# Patient Record
Sex: FEMALE | Race: WHITE | Employment: OTHER | URBAN - METROPOLITAN AREA
[De-identification: names, ages, dates, MRNs, and addresses within clinical notes are randomized per-mention and may not be internally consistent; named-entity substitution may affect disease eponyms.]

---

## 2021-01-26 ENCOUNTER — APPOINTMENT (OUTPATIENT)
Dept: GENERAL RADIOLOGY | Age: 81
DRG: 190 | End: 2021-01-26
Payer: MEDICARE

## 2021-01-26 ENCOUNTER — HOSPITAL ENCOUNTER (INPATIENT)
Age: 81
LOS: 6 days | Discharge: HOME | DRG: 190 | End: 2021-02-01
Attending: EMERGENCY MEDICINE | Admitting: INTERNAL MEDICINE
Payer: MEDICARE

## 2021-01-26 DIAGNOSIS — J18.9 PNEUMONIA DUE TO ORGANISM: Primary | ICD-10-CM

## 2021-01-26 DIAGNOSIS — E87.6 HYPOKALEMIA: ICD-10-CM

## 2021-01-26 DIAGNOSIS — J96.11 CHRONIC RESPIRATORY FAILURE WITH HYPOXIA (HCC): ICD-10-CM

## 2021-01-26 DIAGNOSIS — E83.42 HYPOMAGNESEMIA: ICD-10-CM

## 2021-01-26 DIAGNOSIS — R79.1 SUPRATHERAPEUTIC INR: ICD-10-CM

## 2021-01-26 DIAGNOSIS — J44.1 COPD EXACERBATION (HCC): ICD-10-CM

## 2021-01-26 DIAGNOSIS — D64.9 ANEMIA, UNSPECIFIED TYPE: ICD-10-CM

## 2021-01-26 LAB
ALBUMIN SERPL-MCNC: 3.4 G/DL (ref 3.5–5.2)
ALP BLD-CCNC: 97 U/L (ref 35–104)
ALT SERPL-CCNC: 7 U/L (ref 0–32)
ANION GAP SERPL CALCULATED.3IONS-SCNC: 9 MMOL/L (ref 7–16)
AST SERPL-CCNC: 11 U/L (ref 0–31)
BASOPHILS ABSOLUTE: 0.05 E9/L (ref 0–0.2)
BASOPHILS RELATIVE PERCENT: 0.3 % (ref 0–2)
BILIRUB SERPL-MCNC: 0.3 MG/DL (ref 0–1.2)
BUN BLDV-MCNC: 14 MG/DL (ref 8–23)
CALCIUM SERPL-MCNC: 8.8 MG/DL (ref 8.6–10.2)
CHLORIDE BLD-SCNC: 103 MMOL/L (ref 98–107)
CO2: 31 MMOL/L (ref 22–29)
CREAT SERPL-MCNC: 0.8 MG/DL (ref 0.5–1)
EOSINOPHILS ABSOLUTE: 0.05 E9/L (ref 0.05–0.5)
EOSINOPHILS RELATIVE PERCENT: 0.3 % (ref 0–6)
GFR AFRICAN AMERICAN: >60
GFR NON-AFRICAN AMERICAN: >60 ML/MIN/1.73
GLUCOSE BLD-MCNC: 138 MG/DL (ref 74–99)
HCT VFR BLD CALC: 31.8 % (ref 34–48)
HEMOGLOBIN: 9.5 G/DL (ref 11.5–15.5)
IMMATURE GRANULOCYTES #: 0.09 E9/L
IMMATURE GRANULOCYTES %: 0.5 % (ref 0–5)
INR BLD: 4
LACTIC ACID, SEPSIS: 0.9 MMOL/L (ref 0.5–1.9)
LYMPHOCYTES ABSOLUTE: 1.76 E9/L (ref 1.5–4)
LYMPHOCYTES RELATIVE PERCENT: 10 % (ref 20–42)
MAGNESIUM: 1.5 MG/DL (ref 1.6–2.6)
MCH RBC QN AUTO: 24.4 PG (ref 26–35)
MCHC RBC AUTO-ENTMCNC: 29.9 % (ref 32–34.5)
MCV RBC AUTO: 81.7 FL (ref 80–99.9)
MONOCYTES ABSOLUTE: 0.82 E9/L (ref 0.1–0.95)
MONOCYTES RELATIVE PERCENT: 4.7 % (ref 2–12)
NEUTROPHILS ABSOLUTE: 14.83 E9/L (ref 1.8–7.3)
NEUTROPHILS RELATIVE PERCENT: 84.2 % (ref 43–80)
PDW BLD-RTO: 18.7 FL (ref 11.5–15)
PLATELET # BLD: 734 E9/L (ref 130–450)
PMV BLD AUTO: 9.8 FL (ref 7–12)
POTASSIUM REFLEX MAGNESIUM: 3.4 MMOL/L (ref 3.5–5)
PRO-BNP: 3969 PG/ML (ref 0–450)
PROTHROMBIN TIME: 47 SEC (ref 9.3–12.4)
RBC # BLD: 3.89 E12/L (ref 3.5–5.5)
SARS-COV-2, NAAT: NOT DETECTED
SODIUM BLD-SCNC: 143 MMOL/L (ref 132–146)
TOTAL PROTEIN: 7 G/DL (ref 6.4–8.3)
TROPONIN: <0.01 NG/ML (ref 0–0.03)
WBC # BLD: 17.6 E9/L (ref 4.5–11.5)

## 2021-01-26 PROCEDURE — 2060000000 HC ICU INTERMEDIATE R&B

## 2021-01-26 PROCEDURE — 80053 COMPREHEN METABOLIC PANEL: CPT

## 2021-01-26 PROCEDURE — 6360000002 HC RX W HCPCS: Performed by: STUDENT IN AN ORGANIZED HEALTH CARE EDUCATION/TRAINING PROGRAM

## 2021-01-26 PROCEDURE — 83735 ASSAY OF MAGNESIUM: CPT

## 2021-01-26 PROCEDURE — 6360000002 HC RX W HCPCS: Performed by: EMERGENCY MEDICINE

## 2021-01-26 PROCEDURE — 84484 ASSAY OF TROPONIN QUANT: CPT

## 2021-01-26 PROCEDURE — 96374 THER/PROPH/DIAG INJ IV PUSH: CPT

## 2021-01-26 PROCEDURE — 2580000003 HC RX 258: Performed by: STUDENT IN AN ORGANIZED HEALTH CARE EDUCATION/TRAINING PROGRAM

## 2021-01-26 PROCEDURE — 87040 BLOOD CULTURE FOR BACTERIA: CPT

## 2021-01-26 PROCEDURE — 93005 ELECTROCARDIOGRAM TRACING: CPT | Performed by: STUDENT IN AN ORGANIZED HEALTH CARE EDUCATION/TRAINING PROGRAM

## 2021-01-26 PROCEDURE — 83880 ASSAY OF NATRIURETIC PEPTIDE: CPT

## 2021-01-26 PROCEDURE — 94664 DEMO&/EVAL PT USE INHALER: CPT

## 2021-01-26 PROCEDURE — 6370000000 HC RX 637 (ALT 250 FOR IP): Performed by: STUDENT IN AN ORGANIZED HEALTH CARE EDUCATION/TRAINING PROGRAM

## 2021-01-26 PROCEDURE — U0002 COVID-19 LAB TEST NON-CDC: HCPCS

## 2021-01-26 PROCEDURE — 83605 ASSAY OF LACTIC ACID: CPT

## 2021-01-26 PROCEDURE — 85025 COMPLETE CBC W/AUTO DIFF WBC: CPT

## 2021-01-26 PROCEDURE — 85610 PROTHROMBIN TIME: CPT

## 2021-01-26 PROCEDURE — 99285 EMERGENCY DEPT VISIT HI MDM: CPT

## 2021-01-26 PROCEDURE — 71045 X-RAY EXAM CHEST 1 VIEW: CPT

## 2021-01-26 RX ORDER — MAGNESIUM SULFATE IN WATER 40 MG/ML
2000 INJECTION, SOLUTION INTRAVENOUS ONCE
Status: COMPLETED | OUTPATIENT
Start: 2021-01-26 | End: 2021-01-27

## 2021-01-26 RX ORDER — METHYLPREDNISOLONE SODIUM SUCCINATE 125 MG/2ML
125 INJECTION, POWDER, LYOPHILIZED, FOR SOLUTION INTRAMUSCULAR; INTRAVENOUS ONCE
Status: COMPLETED | OUTPATIENT
Start: 2021-01-26 | End: 2021-01-26

## 2021-01-26 RX ORDER — IPRATROPIUM BROMIDE AND ALBUTEROL SULFATE 2.5; .5 MG/3ML; MG/3ML
1 SOLUTION RESPIRATORY (INHALATION) ONCE
Status: COMPLETED | OUTPATIENT
Start: 2021-01-26 | End: 2021-01-26

## 2021-01-26 RX ORDER — DOXYCYCLINE HYCLATE 100 MG/1
100 CAPSULE ORAL ONCE
Status: COMPLETED | OUTPATIENT
Start: 2021-01-26 | End: 2021-01-26

## 2021-01-26 RX ADMIN — CEFTRIAXONE 1000 MG: 1 INJECTION, POWDER, FOR SOLUTION INTRAMUSCULAR; INTRAVENOUS at 23:14

## 2021-01-26 RX ADMIN — METHYLPREDNISOLONE SODIUM SUCCINATE 125 MG: 125 INJECTION, POWDER, FOR SOLUTION INTRAMUSCULAR; INTRAVENOUS at 21:14

## 2021-01-26 RX ADMIN — IPRATROPIUM BROMIDE AND ALBUTEROL SULFATE 1 AMPULE: .5; 3 SOLUTION RESPIRATORY (INHALATION) at 20:47

## 2021-01-26 RX ADMIN — MAGNESIUM SULFATE HEPTAHYDRATE 2000 MG: 40 INJECTION, SOLUTION INTRAVENOUS at 23:16

## 2021-01-26 RX ADMIN — DOXYCYCLINE HYCLATE 100 MG: 100 CAPSULE ORAL at 23:12

## 2021-01-26 ASSESSMENT — ENCOUNTER SYMPTOMS
PHOTOPHOBIA: 0
SHORTNESS OF BREATH: 1
NAUSEA: 0
VOMITING: 0
DIARRHEA: 0
ABDOMINAL PAIN: 0
ABDOMINAL DISTENTION: 0
SORE THROAT: 0
SPUTUM PRODUCTION: 0
WHEEZING: 1
COUGH: 0

## 2021-01-27 LAB
ANION GAP SERPL CALCULATED.3IONS-SCNC: 10 MMOL/L (ref 7–16)
BUN BLDV-MCNC: 15 MG/DL (ref 8–23)
CALCIUM SERPL-MCNC: 8.4 MG/DL (ref 8.6–10.2)
CHLORIDE BLD-SCNC: 104 MMOL/L (ref 98–107)
CO2: 29 MMOL/L (ref 22–29)
CREAT SERPL-MCNC: 0.8 MG/DL (ref 0.5–1)
EKG ATRIAL RATE: 96 BPM
EKG P AXIS: 46 DEGREES
EKG P-R INTERVAL: 110 MS
EKG Q-T INTERVAL: 366 MS
EKG QRS DURATION: 82 MS
EKG QTC CALCULATION (BAZETT): 462 MS
EKG R AXIS: 54 DEGREES
EKG T AXIS: 68 DEGREES
EKG VENTRICULAR RATE: 96 BPM
GFR AFRICAN AMERICAN: >60
GFR NON-AFRICAN AMERICAN: >60 ML/MIN/1.73
GLUCOSE BLD-MCNC: 216 MG/DL (ref 74–99)
HCT VFR BLD CALC: 31.2 % (ref 34–48)
HEMOGLOBIN: 9.5 G/DL (ref 11.5–15.5)
INR BLD: 3.4
MCH RBC QN AUTO: 24.6 PG (ref 26–35)
MCHC RBC AUTO-ENTMCNC: 30.4 % (ref 32–34.5)
MCV RBC AUTO: 80.8 FL (ref 80–99.9)
PDW BLD-RTO: 18.7 FL (ref 11.5–15)
PLATELET # BLD: 675 E9/L (ref 130–450)
PMV BLD AUTO: 9.7 FL (ref 7–12)
POTASSIUM REFLEX MAGNESIUM: 4.2 MMOL/L (ref 3.5–5)
PROCALCITONIN: 0.06 NG/ML (ref 0–0.08)
PROTHROMBIN TIME: 40 SEC (ref 9.3–12.4)
RBC # BLD: 3.86 E12/L (ref 3.5–5.5)
SODIUM BLD-SCNC: 143 MMOL/L (ref 132–146)
WBC # BLD: 11.9 E9/L (ref 4.5–11.5)

## 2021-01-27 PROCEDURE — 99223 1ST HOSP IP/OBS HIGH 75: CPT | Performed by: INTERNAL MEDICINE

## 2021-01-27 PROCEDURE — 2580000003 HC RX 258: Performed by: INTERNAL MEDICINE

## 2021-01-27 PROCEDURE — 6370000000 HC RX 637 (ALT 250 FOR IP): Performed by: INTERNAL MEDICINE

## 2021-01-27 PROCEDURE — 85610 PROTHROMBIN TIME: CPT

## 2021-01-27 PROCEDURE — 94664 DEMO&/EVAL PT USE INHALER: CPT

## 2021-01-27 PROCEDURE — 36415 COLL VENOUS BLD VENIPUNCTURE: CPT

## 2021-01-27 PROCEDURE — 83036 HEMOGLOBIN GLYCOSYLATED A1C: CPT

## 2021-01-27 PROCEDURE — 6360000002 HC RX W HCPCS: Performed by: INTERNAL MEDICINE

## 2021-01-27 PROCEDURE — 80048 BASIC METABOLIC PNL TOTAL CA: CPT

## 2021-01-27 PROCEDURE — 2700000000 HC OXYGEN THERAPY PER DAY

## 2021-01-27 PROCEDURE — 93010 ELECTROCARDIOGRAM REPORT: CPT | Performed by: INTERNAL MEDICINE

## 2021-01-27 PROCEDURE — 2060000000 HC ICU INTERMEDIATE R&B

## 2021-01-27 PROCEDURE — 85027 COMPLETE CBC AUTOMATED: CPT

## 2021-01-27 PROCEDURE — 94640 AIRWAY INHALATION TREATMENT: CPT

## 2021-01-27 PROCEDURE — 84145 PROCALCITONIN (PCT): CPT

## 2021-01-27 RX ORDER — SODIUM CHLORIDE 0.9 % (FLUSH) 0.9 %
10 SYRINGE (ML) INJECTION PRN
Status: DISCONTINUED | OUTPATIENT
Start: 2021-01-27 | End: 2021-02-01 | Stop reason: HOSPADM

## 2021-01-27 RX ORDER — PANTOPRAZOLE SODIUM 40 MG/1
40 TABLET, DELAYED RELEASE ORAL 2 TIMES DAILY
COMMUNITY

## 2021-01-27 RX ORDER — WARFARIN SODIUM 5 MG/1
10 TABLET ORAL DAILY
Status: ON HOLD | COMMUNITY
End: 2021-02-01 | Stop reason: HOSPADM

## 2021-01-27 RX ORDER — METHYLPREDNISOLONE SODIUM SUCCINATE 40 MG/ML
40 INJECTION, POWDER, LYOPHILIZED, FOR SOLUTION INTRAMUSCULAR; INTRAVENOUS EVERY 12 HOURS
Status: DISCONTINUED | OUTPATIENT
Start: 2021-01-27 | End: 2021-01-31

## 2021-01-27 RX ORDER — PROMETHAZINE HYDROCHLORIDE 25 MG/1
12.5 TABLET ORAL EVERY 6 HOURS PRN
Status: DISCONTINUED | OUTPATIENT
Start: 2021-01-27 | End: 2021-02-01 | Stop reason: HOSPADM

## 2021-01-27 RX ORDER — ARFORMOTEROL TARTRATE 15 UG/2ML
15 SOLUTION RESPIRATORY (INHALATION) 2 TIMES DAILY
Status: DISCONTINUED | OUTPATIENT
Start: 2021-01-27 | End: 2021-02-01 | Stop reason: HOSPADM

## 2021-01-27 RX ORDER — PANTOPRAZOLE SODIUM 40 MG/1
40 TABLET, DELAYED RELEASE ORAL 2 TIMES DAILY
Status: DISCONTINUED | OUTPATIENT
Start: 2021-01-27 | End: 2021-01-30

## 2021-01-27 RX ORDER — HYDRALAZINE HYDROCHLORIDE 20 MG/ML
10 INJECTION INTRAMUSCULAR; INTRAVENOUS EVERY 6 HOURS PRN
Status: DISCONTINUED | OUTPATIENT
Start: 2021-01-27 | End: 2021-02-01 | Stop reason: HOSPADM

## 2021-01-27 RX ORDER — POTASSIUM CHLORIDE 7.45 MG/ML
10 INJECTION INTRAVENOUS PRN
Status: DISCONTINUED | OUTPATIENT
Start: 2021-01-27 | End: 2021-02-01 | Stop reason: HOSPADM

## 2021-01-27 RX ORDER — LISINOPRIL 20 MG/1
20 TABLET ORAL DAILY
Status: DISCONTINUED | OUTPATIENT
Start: 2021-01-27 | End: 2021-02-01 | Stop reason: HOSPADM

## 2021-01-27 RX ORDER — UMECLIDINIUM BROMIDE AND VILANTEROL TRIFENATATE 62.5; 25 UG/1; UG/1
1 POWDER RESPIRATORY (INHALATION) DAILY
COMMUNITY

## 2021-01-27 RX ORDER — ACETAMINOPHEN 325 MG/1
650 TABLET ORAL EVERY 6 HOURS PRN
Status: DISCONTINUED | OUTPATIENT
Start: 2021-01-27 | End: 2021-02-01 | Stop reason: HOSPADM

## 2021-01-27 RX ORDER — IPRATROPIUM BROMIDE AND ALBUTEROL SULFATE 2.5; .5 MG/3ML; MG/3ML
1 SOLUTION RESPIRATORY (INHALATION)
Status: DISCONTINUED | OUTPATIENT
Start: 2021-01-27 | End: 2021-01-30

## 2021-01-27 RX ORDER — POLYETHYLENE GLYCOL 3350 17 G/17G
17 POWDER, FOR SOLUTION ORAL DAILY PRN
Status: DISCONTINUED | OUTPATIENT
Start: 2021-01-27 | End: 2021-02-01 | Stop reason: HOSPADM

## 2021-01-27 RX ORDER — SODIUM CHLORIDE 0.9 % (FLUSH) 0.9 %
10 SYRINGE (ML) INJECTION EVERY 12 HOURS SCHEDULED
Status: DISCONTINUED | OUTPATIENT
Start: 2021-01-27 | End: 2021-02-01 | Stop reason: HOSPADM

## 2021-01-27 RX ORDER — ONDANSETRON 2 MG/ML
4 INJECTION INTRAMUSCULAR; INTRAVENOUS EVERY 6 HOURS PRN
Status: DISCONTINUED | OUTPATIENT
Start: 2021-01-27 | End: 2021-02-01 | Stop reason: HOSPADM

## 2021-01-27 RX ORDER — LISINOPRIL 20 MG/1
20 TABLET ORAL 2 TIMES DAILY
COMMUNITY

## 2021-01-27 RX ORDER — ACETAMINOPHEN 650 MG/1
650 SUPPOSITORY RECTAL EVERY 6 HOURS PRN
Status: DISCONTINUED | OUTPATIENT
Start: 2021-01-27 | End: 2021-02-01 | Stop reason: HOSPADM

## 2021-01-27 RX ORDER — POTASSIUM CHLORIDE 20 MEQ/1
40 TABLET, EXTENDED RELEASE ORAL PRN
Status: DISCONTINUED | OUTPATIENT
Start: 2021-01-27 | End: 2021-02-01 | Stop reason: HOSPADM

## 2021-01-27 RX ADMIN — METHYLPREDNISOLONE SODIUM SUCCINATE 40 MG: 40 INJECTION, POWDER, LYOPHILIZED, FOR SOLUTION INTRAMUSCULAR; INTRAVENOUS at 08:46

## 2021-01-27 RX ADMIN — METHYLPREDNISOLONE SODIUM SUCCINATE 40 MG: 40 INJECTION, POWDER, LYOPHILIZED, FOR SOLUTION INTRAMUSCULAR; INTRAVENOUS at 19:54

## 2021-01-27 RX ADMIN — IPRATROPIUM BROMIDE AND ALBUTEROL SULFATE 1 AMPULE: .5; 3 SOLUTION RESPIRATORY (INHALATION) at 15:40

## 2021-01-27 RX ADMIN — SODIUM CHLORIDE, PRESERVATIVE FREE 10 ML: 5 INJECTION INTRAVENOUS at 08:50

## 2021-01-27 RX ADMIN — ARFORMOTEROL TARTRATE 15 MCG: 15 SOLUTION RESPIRATORY (INHALATION) at 21:03

## 2021-01-27 RX ADMIN — IPRATROPIUM BROMIDE AND ALBUTEROL SULFATE 1 AMPULE: .5; 3 SOLUTION RESPIRATORY (INHALATION) at 11:44

## 2021-01-27 RX ADMIN — ARFORMOTEROL TARTRATE 15 MCG: 15 SOLUTION RESPIRATORY (INHALATION) at 08:15

## 2021-01-27 RX ADMIN — PANTOPRAZOLE SODIUM 40 MG: 40 TABLET, DELAYED RELEASE ORAL at 19:54

## 2021-01-27 RX ADMIN — IPRATROPIUM BROMIDE AND ALBUTEROL SULFATE 1 AMPULE: .5; 3 SOLUTION RESPIRATORY (INHALATION) at 21:03

## 2021-01-27 RX ADMIN — IPRATROPIUM BROMIDE AND ALBUTEROL SULFATE 1 AMPULE: .5; 3 SOLUTION RESPIRATORY (INHALATION) at 08:15

## 2021-01-27 RX ADMIN — PANTOPRAZOLE SODIUM 40 MG: 40 TABLET, DELAYED RELEASE ORAL at 08:46

## 2021-01-27 RX ADMIN — LISINOPRIL 20 MG: 20 TABLET ORAL at 08:46

## 2021-01-27 RX ADMIN — SODIUM CHLORIDE, PRESERVATIVE FREE 10 ML: 5 INJECTION INTRAVENOUS at 19:54

## 2021-01-27 ASSESSMENT — PAIN SCALES - GENERAL: PAINLEVEL_OUTOF10: 0

## 2021-01-27 NOTE — PROGRESS NOTES
[facility] Progress Note    Admitting Date and Time: 1/26/2021  7:25 PM  Admit Dx: PNA (pneumonia) [J18.9]    Subjective:  Patient is being followed for PNA (pneumonia) [J18.9]     Pt reporting she was at [facility] for 5 days 2 weeks ago s/p cholecystectomy  Reporting she was doing ok after  Progressive sob  C/o pain in throat  Thirsty  Does wear 02 at baseline        ROS: denies fever, chills, cp, sob, n/v, HA unless stated above. Assessment:    Active Problems:    PNA (pneumonia)  Resolved Problems:    * No resolved hospital problems. *      Plan:  1. Acute on chronic respiratory failure: wears 02 at baseline. H/o COPD. CXR reviewed. COVID 19 neg. Initial concern was for pneumonia- procal neg. abx stopped. Continue 02- wean to baseline    2. Possible pneumonia: ruled out. procal neg. COVID 19 neg    3. COPD exacerbation: continue IV steroids/ breathing treatments. Denies productive cough    4. H/o DVT/ PE: on coumadin- supra therapeutic INR    5. HTN: continue meds    6. Recent cholecystectomy 2 weeks ago at [facility]    7. Leukocytosis: ? Reactive- pt reported not eating great since surgery 2 weeks ago. 8. Deconditioning: PT/OT          NOTE: This report was transcribed using voice recognition software. Every effort was made to ensure accuracy; however, inadvertent computerized transcription errors may be present.   Electronically signed by KATYA Davey on 1/27/2021 at 9:52 AM

## 2021-01-27 NOTE — H&P
[facility] History and Physical      CHIEF COMPLAINT:  dyspnea    History of Present Illness: Patient is an [de-identified]year old female with past medical history significant for COPD, DVT, PE. She presented to the ED with complaints of intermittent dyspnea that began 5 days ago and has been getting worse. She reports that she has been using her inhaler at home for COPD, but it is not helping. She got to the point where it was so severe today, she decided to call EMS. On their initial evaluation, patient was noted to be hypoxic, but she was weaned off of oxygen after a nebulizer treatment. She reports that she has been taking her warfarin. Her INR was supratherapeutic at 4 in the ED. Her ED work-up was also significant for leukocytosis with a white count of 17, negative Covid test, chest x-ray demonstrating left basilar atelectasis versus asymmetric edema or pneumonia and a small left pleural effusion. Medicine was asked to admit for pneumonia, COPD exacerbation. REVIEW OF SYSTEMS:  A comprehensive review of systems was negative except for: what is in the HPI    PMH:  Past Medical History:   Diagnosis Date    [facility]     DVT (deep venous thrombosis) [facility] [facility]     Hypertension     Pulmonary embolism ([facility] [facility]      Surgical History:  Past Surgical History:   Procedure Laterality Date    CARPAL TUNNEL RELEASE Right     CHOLECYSTECTOMY      HYSTERECTOMY      partial    MASTECTOMY, BILATERAL  1988     Medications Prior to Admission:    Prior to Admission medications    Medication Sig Start Date End Date Taking?  Authorizing Provider   lisinopril (PRINIVIL;ZESTRIL) 20 MG tablet Take 20 mg by mouth daily   Yes Historical Provider, MD   warfarin (COUMADIN) 5 MG tablet Take 10 mg by mouth daily   Yes Historical Provider, MD   pantoprazole (PROTONIX) 40 MG tablet Take 40 mg by mouth 2 times daily   Yes Historical Provider, MD   umeclidinium-vilanterol [facility] ELLIPTA) 62.5-25 MCG/INH AEPB inhaler Inhale 1 puff into the lungs daily   Yes Historical Provider, MD       Allergies:    Carafate [sucralfate]    Social History:    reports that she has been smoking. She has been smoking about 1.00 pack per day. She has never used smokeless tobacco. She reports previous alcohol use. She reports that she does not use drugs. Family History:   Reviewed, not pertinent to encounter    PHYSICAL EXAM:  Vitals:  BP (!) 197/85   Pulse 95   Temp 98.2 °F (36.8 °C) (Oral)   Resp 18   Ht 5' 5\" (1.651 m)   Wt 189 lb (85.7 kg)   SpO2 96%   BMI 31.45 kg/m²     General Appearance: alert and oriented to person, place and time and in no acute distress  Skin: warm and dry  Head: normocephalic and atraumatic  Eyes: pupils equal, round, and reactive to light, extraocular eye movements intact, conjunctivae normal  Neck: neck supple and non tender without mass   Pulmonary/Chest: Bilateral wheezes, no rales or rhonchi, normal air movement, no respiratory distress  Cardiovascular: Tachycardia, normal S1 and S2 and no carotid bruits  Abdomen: soft, non-tender, non-distended, normal bowel sounds, no masses or organomegaly  Extremities: no cyanosis, no clubbing and no edema  Neurologic: no cranial nerve deficit and speech normal        LABS:  Recent Labs     01/26/21 2115      K 3.4*      CO2 31*   BUN 14   CREATININE 0.8   GLUCOSE 138*   CALCIUM 8.8       Recent Labs     01/26/21 2115   WBC 17.6*   RBC 3.89   HGB 9.5*   HCT 31.8*   MCV 81.7   MCH 24.4*   MCHC 29.9*   RDW 18.7*   *   MPV 9.8       No results for input(s): POCGLU in the last 72 hours.     CBC:   Lab Results   Component Value Date    WBC 17.6 01/26/2021    RBC 3.89 01/26/2021    HGB 9.5 01/26/2021    HCT 31.8 01/26/2021    MCV 81.7 01/26/2021    MCH 24.4 01/26/2021    MCHC 29.9 01/26/2021    RDW 18.7 01/26/2021     01/26/2021    MPV 9.8 01/26/2021     CMP:    Lab Results   Component Value Date     01/26/2021    K 3.4 01/26/2021     01/26/2021    CO2 31 01/26/2021    BUN 14 01/26/2021    CREATININE 0.8 01/26/2021    GFRAA >60 01/26/2021    LABGLOM >60 01/26/2021    GLUCOSE 138 01/26/2021    PROT 7.0 01/26/2021    LABALBU 3.4 01/26/2021    CALCIUM 8.8 01/26/2021    BILITOT 0.3 01/26/2021    ALKPHOS 97 01/26/2021    AST 11 01/26/2021    ALT 7 01/26/2021       Radiology:   XR CHEST PORTABLE   Final Result   Left basilar atelectasis versus asymmetrical edema or pneumonia. Small left   pleural effusion. EKG: sinus rhythm    ASSESSMENT:      Active Problems:    PNA (pneumonia)  Resolved Problems:    * No resolved hospital problems. *      PLAN:    Acute hypoxic respiratory failure  Pneumonia  Sepsis  COPD exacerbation  40-year-old female presenting with several days of worsening dyspnea. She was noted to be hypoxic by EMS, but this improved after neb treatment. ED work-up is significant for chest x-ray showing possible pneumonia. Patient also has a leukocytosis, tachycardia  -Admit to medical unit  -Supplemental oxygen, wean as tolerated  -Continue empiric antibiotics  -Follow cultures  -DuoNeb  -Solu-Medrol    History of DVT / PE  On warfarin chronically. Supratherapeutic INR of 4 today.  -pharmacy to dose warfarin    Hypertension  Hypertensive in ED  -continue home dose of lisinopril  -PRN hydralazine     GERD  -Protonix    Code Status: full  DVT prophylaxis: warfarin      NOTE: This report was transcribed using voice recognition software. Every effort was made to ensure accuracy; however, inadvertent computerized transcription errors may be present.   Electronically signed by Elijah Munoz DO on 1/27/2021 at 3:02 AM

## 2021-01-27 NOTE — ED NOTES
Pt eatign breakfast no s/s of distress. Continuing to monitor.  Updated on plan of care     Meghana Shepherd RN  01/27/21 0876

## 2021-01-27 NOTE — PROGRESS NOTES
Pharmacy Consultation Note  (Anticoagulant Dosing and Monitoring)    Initial consult date: 1/27  Consulting physician: Emily Coronel    Allergies:  Carafate [sucralfate]    [de-identified] y.o. female      Ht Readings from Last 1 Encounters:   01/26/21 5' 5\" (1.651 m)     Wt Readings from Last 1 Encounters:   01/26/21 189 lb (85.7 kg)         Warfarin Indication Target   INR Range Home Dose  (if applicable) Diet/Feeding Tube   History of DVT/PE 2-3 5 mg on Tuesday and Thursday; 10 mg on all other days  (per patient interview)      Vitamin K or Blood product  Administration Date               Warfarin drug-drug interactions  Start  Stop Home Med? Comments    Methylprednisolone 1/26  no May enhance anticoagulatory effect of warfarin                         Hepatic Function Panel:                            Lab Results   Component Value Date    ALKPHOS 97 01/26/2021    ALT 7 01/26/2021    AST 11 01/26/2021    PROT 7.0 01/26/2021    BILITOT 0.3 01/26/2021    LABALBU 3.4 01/26/2021       Date Warfarin Dose INR Heparin or LMWH HBG/HCT PLT Comment   1/27 X 3.4 X 9.5/31.2 675                                          Assessment:  I spoke with patient who states she takes warfarin  5 mg on Tuesday and Thursday 10 mg on all other days for history of DVT/PE    Plan:  · With goal INR of 2-3, INR is currently supratherapeutic and will not give any warfarin today. · Daily PT/INR until the INR is stable within the therapeutic range. · Pharmacist will follow and monitor/adjust dosing as necessary.     Renee Chaudhari PharmD 1/27/2021 11:50 AM

## 2021-01-27 NOTE — ED NOTES
Pt sleeping comfortably. No distress noted. Call light in hand.      Leopoldo Needy, RN  01/27/21 6274

## 2021-01-27 NOTE — ED NOTES
Bed: 06  Expected date:   Expected time:   Means of arrival:   Comments:  EMS SOB     Marck Ramos RN  35/54/53 [id number]

## 2021-01-27 NOTE — ED PROVIDER NOTES
ATTENDING PROVIDER ATTESTATION:     Deepa Dumont presented to the emergency department for evaluation of Shortness of Breath (Per EMS, pt has hx of COPD. high 80s upon arrival to home. Given 2 albuterol treatments in route.)   and was initially evaluated by the Medical Resident. See Original ED Note for H&P and ED course above. I have reviewed and discussed the case, including pertinent history (medical, surgical, family and social) and exam findings with the Medical Resident assigned to Deepa Dumont. I have personally performed and/or participated in the history, exam, medical decision making, and procedures and agree with all pertinent clinical information. I, Dr. Michaela Castano MD, am the primary provider of this record. I have reviewed my findings and recommendations with the assigned Medical Resident, Deepa Tim and members of family present at the time of disposition. My findings/plan: The primary encounter diagnosis was Pneumonia due to organism. Diagnoses of COPD exacerbation ([address] Hypomagnesemia, Chronic respiratory failure with hypoxia ([address] Anemia, unspecified type, Hypokalemia, and Supratherapeutic INR were also pertinent to this visit. New Prescriptions    No medications on file     Michaela Castano MD      HPI:  Deepa Dumont is an 28-year-old female present emergency department with concern for intermittent shortness of breath that started Friday and is worsening. Patient states that she has been having diffuse wheezing. Patient has used her home COPD medication without improvement of symptoms. Patient symptoms became severe today and patient called EMS to bring her to emergency department. Patient was given one DuoNeb in route. Patient was not given steroids. Patient denies any chest pain, fever, chills, nausea, vomiting. Patient states that she recently lost her sense of taste. Patient did have a cholecystectomy 2 weeks ago.   Patient was off atraumatic. Eyes:      General: No scleral icterus. Conjunctiva/sclera: Conjunctivae normal.      Pupils: Pupils are equal, round, and reactive to light. Neck:      Musculoskeletal: Normal range of motion and neck supple. No neck rigidity or muscular tenderness. Cardiovascular:      Rate and Rhythm: Regular rhythm. Tachycardia present. Pulmonary:      Effort: No respiratory distress. Breath sounds: No stridor. Wheezing present. No rhonchi. Chest:      Chest wall: No tenderness. Abdominal:      General: Bowel sounds are normal. There is no distension. Palpations: Abdomen is soft. Tenderness: There is no abdominal tenderness. There is no guarding or rebound. Comments: Well-healing laparoscopic incisions to abdomen   Musculoskeletal:      Right lower leg: Edema present. Left lower leg: Edema present. Comments: Mild trace LE edema     Skin:     General: Skin is warm and dry. Capillary Refill: Capillary refill takes less than 2 seconds. Coloration: Skin is not pale. Findings: No erythema or rash. Neurological:      Mental Status: She is alert and oriented to person, place, and time. Psychiatric:         Mood and Affect: Mood normal.          Procedures     MDM  Number of Diagnoses or Management Options  Anemia, unspecified type  Chronic respiratory failure with hypoxia (HCC)  COPD exacerbation (HCC)  Hypomagnesemia  Pneumonia due to organism  Diagnosis management comments: Alexsandra Clemens is an [de-identified]year old female who presented to ED with concerns for shortness of breath. Patient was hypoxic at rest.  Patient was given total of 3 DuoNeb treatments and Solu-Medrol. Patient had improvement of hypoxia. Patient found to have a likely pneumonia with a leukocytosis. Patient was started on Rocephin and doxy. Patient was given magnesium and potassium in emergency department. Patient appears significantly improved at time of repeat evaluation.   Blood cultures reports that she does not use drugs. Family History: family history is not on file. The patients home medications have been reviewed.     Allergies: Carafate [sucralfate]    -------------------------------------------------- RESULTS -------------------------------------------------    LABS:  Results for orders placed or performed during the hospital encounter of 01/26/21   CBC auto differential   Result Value Ref Range    WBC 17.6 (H) 4.5 - 11.5 E9/L    RBC 3.89 3.50 - 5.50 E12/L    Hemoglobin 9.5 (L) 11.5 - 15.5 g/dL    Hematocrit 31.8 (L) 34.0 - 48.0 %    MCV 81.7 80.0 - 99.9 fL    MCH 24.4 (L) 26.0 - 35.0 pg    MCHC 29.9 (L) 32.0 - 34.5 %    RDW 18.7 (H) 11.5 - 15.0 fL    Platelets 295 (H) 609 - 450 E9/L    MPV 9.8 7.0 - 12.0 fL    Neutrophils % 84.2 (H) 43.0 - 80.0 %    Immature Granulocytes % 0.5 0.0 - 5.0 %    Lymphocytes % 10.0 (L) 20.0 - 42.0 %    Monocytes % 4.7 2.0 - 12.0 %    Eosinophils % 0.3 0.0 - 6.0 %    Basophils % 0.3 0.0 - 2.0 %    Neutrophils Absolute 14.83 (H) 1.80 - 7.30 E9/L    Immature Granulocytes # 0.09 E9/L    Lymphocytes Absolute 1.76 1.50 - 4.00 E9/L    Monocytes Absolute 0.82 0.10 - 0.95 E9/L    Eosinophils Absolute 0.05 0.05 - 0.50 E9/L    Basophils Absolute 0.05 0.00 - 0.20 E9/L   Comprehensive Metabolic Panel w/ Reflex to MG   Result Value Ref Range    Sodium 143 132 - 146 mmol/L    Potassium reflex Magnesium 3.4 (L) 3.5 - 5.0 mmol/L    Chloride 103 98 - 107 mmol/L    CO2 31 (H) 22 - 29 mmol/L    Anion Gap 9 7 - 16 mmol/L    Glucose 138 (H) 74 - 99 mg/dL    BUN 14 8 - 23 mg/dL    CREATININE 0.8 0.5 - 1.0 mg/dL    GFR Non-African American >60 >=60 mL/min/1.73    GFR African American >60     Calcium 8.8 8.6 - 10.2 mg/dL    Total Protein 7.0 6.4 - 8.3 g/dL    Albumin 3.4 (L) 3.5 - 5.2 g/dL    Total Bilirubin 0.3 0.0 - 1.2 mg/dL    Alkaline Phosphatase 97 35 - 104 U/L    ALT 7 0 - 32 U/L    AST 11 0 - 31 U/L   Troponin   Result Value Ref Range    Troponin <0.01 0.00 - 0.03 ng/mL Brain Natriuretic Peptide   Result Value Ref Range    Pro-BNP 3,969 (H) 0 - 450 pg/mL   Protime-INR   Result Value Ref Range    Protime 47.0 (H) 9.3 - 12.4 sec    INR 4.0    COVID-19   Result Value Ref Range    SARS-CoV-2, NAAT Not Detected Not Detected   Lactate, Sepsis   Result Value Ref Range    Lactic Acid, Sepsis 0.9 0.5 - 1.9 mmol/L   Magnesium   Result Value Ref Range    Magnesium 1.5 (L) 1.6 - 2.6 mg/dL   Basic Metabolic Panel w/ Reflex to MG   Result Value Ref Range    Sodium 143 132 - 146 mmol/L    Potassium reflex Magnesium 4.2 3.5 - 5.0 mmol/L    Chloride 104 98 - 107 mmol/L    CO2 29 22 - 29 mmol/L    Anion Gap 10 7 - 16 mmol/L    Glucose 216 (H) 74 - 99 mg/dL    BUN 15 8 - 23 mg/dL    CREATININE 0.8 0.5 - 1.0 mg/dL    GFR Non-African American >60 >=60 mL/min/1.73    GFR African American >60     Calcium 8.4 (L) 8.6 - 10.2 mg/dL   CBC   Result Value Ref Range    WBC 11.9 (H) 4.5 - 11.5 E9/L    RBC 3.86 3.50 - 5.50 E12/L    Hemoglobin 9.5 (L) 11.5 - 15.5 g/dL    Hematocrit 31.2 (L) 34.0 - 48.0 %    MCV 80.8 80.0 - 99.9 fL    MCH 24.6 (L) 26.0 - 35.0 pg    MCHC 30.4 (L) 32.0 - 34.5 %    RDW 18.7 (H) 11.5 - 15.0 fL    Platelets 408 (H) 512 - 450 E9/L    MPV 9.7 7.0 - 12.0 fL   EKG 12 Lead   Result Value Ref Range    Ventricular Rate 96 BPM    Atrial Rate 96 BPM    P-R Interval 110 ms    QRS Duration 82 ms    Q-T Interval 366 ms    QTc Calculation (Bazett) 462 ms    P Axis 46 degrees    R Axis 54 degrees    T Axis 68 degrees       RADIOLOGY:  XR CHEST PORTABLE   Final Result   Left basilar atelectasis versus asymmetrical edema or pneumonia. Small left   pleural effusion.              ------------------------- NURSING NOTES AND VITALS REVIEWED ---------------------------  Date / Time Roomed:  1/26/2021  7:25 PM  ED Bed Assignment:  06/06    The nursing notes within the ED encounter and vital signs as below have been reviewed.      Patient Vitals for the past 24 hrs:   BP Temp Temp src Pulse Resp SpO2 Height Weight   01/27/21 0506 (!) 163/74 -- -- 85 14 96 % -- --   01/27/21 0426 (!) 167/78 -- -- 91 16 96 % -- --   01/26/21 2352 (!) 197/85 98.2 °F (36.8 °C) Oral 95 18 96 % -- --   01/26/21 2118 -- -- -- 96 16 98 % -- --   01/26/21 1942 -- -- -- 121 -- 96 % -- --   01/26/21 1935 -- -- -- -- -- -- 5' 5\" (1.651 m) 189 lb (85.7 kg)   01/26/21 1934 (!) 184/83 98 °F (36.7 °C) -- -- -- (!) 89 % -- --   01/26/21 1932 -- -- -- 73 18 -- -- --       Oxygen Saturation Interpretation: Improved after treatment    ------------------------------------------ PROGRESS NOTES ------------------------------------------  Re-evaluation(s):  Time: 11pm  Patients symptoms are improving  Repeat physical examination is improved    Counseling:  I have spoken with the patient and discussed todays results, in addition to providing specific details for the plan of care and counseling regarding the diagnosis and prognosis. Their questions are answered at this time and they are agreeable with the plan of admission.    --------------------------------- ADDITIONAL PROVIDER NOTES ---------------------------------  Consultations:  Spoke with Dr. Favian Medrano. Discussed case. They will admit the patient. This patient's ED course included: a personal history and physicial examination, re-evaluation prior to disposition, multiple bedside re-evaluations, IV medications, cardiac monitoring, continuous pulse oximetry and complex medical decision making and emergency management    This patient has remained hemodynamically stable during their ED course. Diagnosis:  1. Pneumonia due to organism    2. COPD exacerbation ([address]    3. Hypomagnesemia    4. Chronic respiratory failure with hypoxia (HCC)    5. Anemia, unspecified type    6. Hypokalemia    7. Supratherapeutic INR        Disposition:  Patient's disposition: Admit to telemetry  Patient's condition is stable.               Yovany Kruger MD  Resident  01/26/21 8282       Sara Sanchez, MD  01/27/21 4172

## 2021-01-28 LAB
HBA1C MFR BLD: 5.8 % (ref 4–5.6)
INR BLD: 2.2
LV EF: 60 %
LVEF MODALITY: NORMAL
METER GLUCOSE: 119 MG/DL (ref 74–99)
METER GLUCOSE: 137 MG/DL (ref 74–99)
METER GLUCOSE: 139 MG/DL (ref 74–99)
METER GLUCOSE: 146 MG/DL (ref 74–99)
PROTHROMBIN TIME: 26.2 SEC (ref 9.3–12.4)

## 2021-01-28 PROCEDURE — 97165 OT EVAL LOW COMPLEX 30 MIN: CPT

## 2021-01-28 PROCEDURE — 6370000000 HC RX 637 (ALT 250 FOR IP): Performed by: INTERNAL MEDICINE

## 2021-01-28 PROCEDURE — 2060000000 HC ICU INTERMEDIATE R&B

## 2021-01-28 PROCEDURE — 6370000000 HC RX 637 (ALT 250 FOR IP): Performed by: FAMILY MEDICINE

## 2021-01-28 PROCEDURE — 93306 TTE W/DOPPLER COMPLETE: CPT

## 2021-01-28 PROCEDURE — 87389 HIV-1 AG W/HIV-1&-2 AB AG IA: CPT

## 2021-01-28 PROCEDURE — 6360000002 HC RX W HCPCS: Performed by: INTERNAL MEDICINE

## 2021-01-28 PROCEDURE — 36415 COLL VENOUS BLD VENIPUNCTURE: CPT

## 2021-01-28 PROCEDURE — 85610 PROTHROMBIN TIME: CPT

## 2021-01-28 PROCEDURE — APPSS30 APP SPLIT SHARED TIME 16-30 MINUTES: Performed by: NURSE PRACTITIONER

## 2021-01-28 PROCEDURE — 94660 CPAP INITIATION&MGMT: CPT

## 2021-01-28 PROCEDURE — 82962 GLUCOSE BLOOD TEST: CPT

## 2021-01-28 PROCEDURE — 94640 AIRWAY INHALATION TREATMENT: CPT

## 2021-01-28 PROCEDURE — 2580000003 HC RX 258: Performed by: INTERNAL MEDICINE

## 2021-01-28 PROCEDURE — 97161 PT EVAL LOW COMPLEX 20 MIN: CPT

## 2021-01-28 RX ORDER — WARFARIN SODIUM 5 MG/1
5 TABLET ORAL
Status: ON HOLD | COMMUNITY
End: 2021-02-01 | Stop reason: SDUPTHER

## 2021-01-28 RX ORDER — DEXTROSE MONOHYDRATE 50 MG/ML
100 INJECTION, SOLUTION INTRAVENOUS PRN
Status: DISCONTINUED | OUTPATIENT
Start: 2021-01-28 | End: 2021-02-01 | Stop reason: HOSPADM

## 2021-01-28 RX ORDER — WARFARIN SODIUM 5 MG/1
5 TABLET ORAL
Status: COMPLETED | OUTPATIENT
Start: 2021-01-28 | End: 2021-01-28

## 2021-01-28 RX ORDER — DEXTROSE MONOHYDRATE 25 G/50ML
12.5 INJECTION, SOLUTION INTRAVENOUS PRN
Status: DISCONTINUED | OUTPATIENT
Start: 2021-01-28 | End: 2021-02-01 | Stop reason: HOSPADM

## 2021-01-28 RX ORDER — NICOTINE POLACRILEX 4 MG
15 LOZENGE BUCCAL PRN
Status: DISCONTINUED | OUTPATIENT
Start: 2021-01-28 | End: 2021-02-01 | Stop reason: HOSPADM

## 2021-01-28 RX ADMIN — LISINOPRIL 20 MG: 20 TABLET ORAL at 08:53

## 2021-01-28 RX ADMIN — SODIUM CHLORIDE, PRESERVATIVE FREE 10 ML: 5 INJECTION INTRAVENOUS at 08:53

## 2021-01-28 RX ADMIN — WARFARIN SODIUM 5 MG: 5 TABLET ORAL at 16:44

## 2021-01-28 RX ADMIN — ARFORMOTEROL TARTRATE 15 MCG: 15 SOLUTION RESPIRATORY (INHALATION) at 08:28

## 2021-01-28 RX ADMIN — METHYLPREDNISOLONE SODIUM SUCCINATE 40 MG: 40 INJECTION, POWDER, LYOPHILIZED, FOR SOLUTION INTRAMUSCULAR; INTRAVENOUS at 08:53

## 2021-01-28 RX ADMIN — INSULIN LISPRO 1 UNITS: 100 INJECTION, SOLUTION INTRAVENOUS; SUBCUTANEOUS at 11:30

## 2021-01-28 RX ADMIN — SODIUM CHLORIDE, PRESERVATIVE FREE 10 ML: 5 INJECTION INTRAVENOUS at 19:54

## 2021-01-28 RX ADMIN — SODIUM CHLORIDE, PRESERVATIVE FREE 10 ML: 5 INJECTION INTRAVENOUS at 10:40

## 2021-01-28 RX ADMIN — PANTOPRAZOLE SODIUM 40 MG: 40 TABLET, DELAYED RELEASE ORAL at 19:54

## 2021-01-28 RX ADMIN — METHYLPREDNISOLONE SODIUM SUCCINATE 40 MG: 40 INJECTION, POWDER, LYOPHILIZED, FOR SOLUTION INTRAMUSCULAR; INTRAVENOUS at 19:54

## 2021-01-28 RX ADMIN — PANTOPRAZOLE SODIUM 40 MG: 40 TABLET, DELAYED RELEASE ORAL at 08:53

## 2021-01-28 RX ADMIN — IPRATROPIUM BROMIDE AND ALBUTEROL SULFATE 1 AMPULE: .5; 3 SOLUTION RESPIRATORY (INHALATION) at 08:28

## 2021-01-28 RX ADMIN — IPRATROPIUM BROMIDE AND ALBUTEROL SULFATE 1 AMPULE: .5; 3 SOLUTION RESPIRATORY (INHALATION) at 11:39

## 2021-01-28 RX ADMIN — ARFORMOTEROL TARTRATE 15 MCG: 15 SOLUTION RESPIRATORY (INHALATION) at 20:49

## 2021-01-28 RX ADMIN — IPRATROPIUM BROMIDE AND ALBUTEROL SULFATE 1 AMPULE: .5; 3 SOLUTION RESPIRATORY (INHALATION) at 16:05

## 2021-01-28 RX ADMIN — IPRATROPIUM BROMIDE AND ALBUTEROL SULFATE 1 AMPULE: .5; 3 SOLUTION RESPIRATORY (INHALATION) at 20:49

## 2021-01-28 NOTE — PROGRESS NOTES
Pharmacy Consultation Note  (Anticoagulant Dosing and Monitoring)    Initial consult date: 1/27  Consulting physician: Drema Gilford    Allergies:  Carafate [sucralfate]    [de-identified] y.o. female      Ht Readings from Last 1 Encounters:   01/26/21 5' 5\" (1.651 m)     Wt Readings from Last 1 Encounters:   01/26/21 189 lb (85.7 kg)         Warfarin Indication Target   INR Range Home Dose  (if applicable) Diet/Feeding Tube   History of DVT/PE 2-3 5 mg on Tuesday and Thursday; 10 mg on all other days  (per patient interview)      Vitamin K or Blood product  Administration Date               Warfarin drug-drug interactions  Start  Stop Home Med?  Comments    Methylprednisolone 1/26  no May enhance anticoagulatory effect of warfarin                         Hepatic Function Panel:                            Lab Results   Component Value Date    ALKPHOS 97 01/26/2021    ALT 7 01/26/2021    AST 11 01/26/2021    PROT 7.0 01/26/2021    BILITOT 0.3 01/26/2021    LABALBU 3.4 01/26/2021       Date Warfarin Dose INR Heparin or LMWH HBG/HCT PLT Comment   1/27 No dose 3.4 X 9.5/31.2 675    1/28 5 mg 2.2 X X X                                 Assessment:  · Patient is a [de-identified]year old female on warfarin for history of DVT/PE  · Per patient, she takes warfarin 5 mg on Tuesday and Thursday and warfarin 10 mg all other days of the week  · INR upon admission on 1/26 was 4  · INR goal = 2-3; INR today = 2.2 (therapeutic, decreased from 3.4 yesterday)    Plan:  · Will give warfarin 5 mg x 1 dose today  · Daily PT/INR until the INR is stable within the therapeutic range  · Pharmacist will follow and monitor/adjust dosing as necessary    Jhon Bowen, AyanD, BCCCP  1/28/2021  2:44 PM

## 2021-01-28 NOTE — CARE COORDINATION
1/28/2021  Social Work Discharge Planning: This worker met with Pt to discuss  role and transition of care/discharge planning. Pt has a home in [place] but has been residing with her daughter in her 2 story home. Pt stays on the first floor. Pt has a \"hurricane\" cane and uses 2l home o2 via [facility]-verified with Joe . Pt is currently on 2l here. Pt is declining the need for HHC. Awaiting PT eval. Pharmacy is [facility] in [place] and PCP is .  Electronically signed by AGUSTÍN Patterson on 1/28/2021 at 11:55 AM

## 2021-01-28 NOTE — PROGRESS NOTES
Physical Therapy    Facility/Department: [address] INTERNAL MEDICINE 2  Initial Assessment    NAME: Mukund Rojas  : 1940  MRN: [id number]    Date of Service: 2021    Patient Diagnosis(es): The primary encounter diagnosis was Pneumonia due to organism. Diagnoses of COPD exacerbation ([address] Hypomagnesemia, Chronic respiratory failure with hypoxia ([address] Anemia, unspecified type, Hypokalemia, and Supratherapeutic INR were also pertinent to this visit. has a past medical history of COPD (chronic obstructive pulmonary disease) ([address] DVT (deep venous thrombosis) ([address] Hypertension, and Pulmonary embolism ([address] has a past surgical history that includes Cholecystectomy; Mastectomy, bilateral (); Carpal tunnel release (Right); and Hysterectomy. Referring Provider:  KATYA Hurley    Evaluating PT:  Eliza Cronin PT, DPT [id number]    Room #:  2009/8617-L  Diagnosis:  Pneumonia  Precautions:  Fall risk, O2, impaired vision. Can only see shapes. Equipment Needs:  None. Pt reported owing a cane. SUBJECTIVE:    Pt lives with her daughter in a 1 story home with 2+1 stairs to enter and 1 rail. Bed and bath is on first floor. Pt ambulated with cane PTA. Pt reported being independent with functional mobility. OBJECTIVE:   Initial Evaluation  Date:  Treatment Short Term/ Long Term   Goals   Was pt agreeable to Eval/treatment? yes     Does pt have pain?  None reported     Bed Mobility  Rolling: Supervision  Supine to sit: supervision  Sit to supine: NT  Scooting: supervision to sitting EOB  independent   Transfers Sit to stand: SBA  Stand to sit: SBA  Stand pivot: SBA with w/w  independent   Ambulation   40 feet with w/w SBA  100+ feet with SPC modified independent    Stair negotiation: ascended and descended  NT  3 steps with 1 rail modified independent   ROM BLE:  WFL     Strength BLE:  Grossly 4/5     Balance Sitting EOB:  independent  Dynamic Standing:  SBA with w/w  Sitting

## 2021-01-28 NOTE — PROGRESS NOTES
mass   Pulmonary/Chest: clear to auscultation bilaterally- no wheezes, rales or rhonchi, normal air movement, no respiratory distress  Cardiovascular: normal rate, normal S1 and S2 and no rubs, gallops, or murmur noted. Abdomen: soft, non-tender, non-distended, normal bowel sounds, no rebound, guarding, rigidity noted  Extremities: no cyanosis, no clubbing and no edema  Neurologic: no cranial nerve deficit and speech normal      Recent Labs     01/26/21 2115 01/27/21  0415    143   K 3.4* 4.2    104   CO2 31* 29   BUN 14 15   CREATININE 0.8 0.8   GLUCOSE 138* 216*   CALCIUM 8.8 8.4*       Recent Labs     01/26/21 2115   ALKPHOS 97   PROT 7.0   LABALBU 3.4*   BILITOT 0.3   AST 11   ALT 7       Recent Labs     01/26/21 2115 01/27/21  0415   WBC 17.6* 11.9*   RBC 3.89 3.86   HGB 9.5* 9.5*   HCT 31.8* 31.2*   MCV 81.7 80.8   MCH 24.4* 24.6*   MCHC 29.9* 30.4*   RDW 18.7* 18.7*   * 675*   MPV 9.8 9.7      Ref. Range 1/26/2021 21:15 1/27/2021 09:42   INR Unknown 4.0 3.4      Ref. Range 1/27/2021 04:15   Procalcitonin Latest Ref Range: 0.00 - 0.08 ng/mL 0.06         Radiology:   XR CHEST PORTABLE   Final Result   Left basilar atelectasis versus asymmetrical edema or pneumonia. Small left   pleural effusion. Assessment:  Active Problems:    PNA (pneumonia)  Resolved Problems:    * No resolved hospital problems. *      Plan:  1. Acute on Chronic respiratory failure with hypoxia- 2/2 COPD exacerbating. COVID-10 negative. Procal negative. Currently on 2L NC which is baseline per pt. Maintain SPO2>92%. Continue steroids/nebs. Encourage incentive spirometry. ECHO pending. 2. ?Pneumonia- 1/26/2021 CXR Left basilar atelectasis vs. Asymmetrical edema or Pneumonia. Small left pleural effusion. received Doxycycline/Ceftriaxone in ED course. Procalcitonin 0.06. Continue off Abx. 3. COPD Exacerbation- Continue Solumedrol/Nebs/brovana. 4. Supratherapeutic INR- Home regimen Warfarin on hold. INR 4.0>3.4. Continue Daily INR. Monitor closely for bleeding. Will resume once therapeutic. 5. Leukocytosis- Reactive? WBC 17.6>11.9. With improvement this morning. Afebrile. Procal negative. Monitor off abx.     6. 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 Will resume with INR<3. SCDs. 7. Recent Cholecystectomy- Approximately 2 weeks prior to admission. Performed at [address] [place] [address],[address]- PT/OT for evaluation. 9.   Disposition- ECHO pending. PT/OT evaluation pending. Home with Home Health/[facility]. NOTE: This report was transcribed using voice recognition software. Every effort was made to ensure accuracy; however, inadvertent computerized transcription errors may be present. Electronically signed by Christopher Blanco CNP on 1/28/2021 at 8:52 AM

## 2021-01-28 NOTE — PROGRESS NOTES
Occupational Therapy  OCCUPATIONAL THERAPY INITIAL EVALUATION      Date:2021  Patient Name: Batsheva Staples  MRN: [id number]  : 1940  Room: [address] [place] [address]    Referring Provider: Zoraida Trammell       Evaluating OT: Katina Saenz OTR/L [id number]    AM-PAC Daily Activity Raw Score: 18    Recommended Adaptive Equipment:  TBD     Diagnosis: Pneumonia     Pertinent Medical History: COPD, pulmonary embolism   Precautions:  Falls, impaired vision, alarm, O2     Home Living: Pt lives with daughter and family.   1 story with 2 + 1 steps/no rail    Bathroom setup: tub/shower with seat    Equipment owned: hurri cane, home O2    Prior Level of Function: Independent  with ADLs , assist with  with IADLs; ambulated with hurri cane   Driving: family transport     Pain Level: no pain ;   Cognition: alert, oriented x3 and conversing    Memory:  Good    Sequencing:  Good    Problem solving:  Fair    Judgement/safety:  fair     Functional Assessment:   Initial Eval Status  Date:  21 Treatment Status  Date: STGs = LTGs  Time frame: 7-10 days   Feeding Independent      Grooming SBA/set-up   Mod I    UB Dressing SBA/set-up   Mod I    LB Dressing Min A  Assist threading brief over feet   Mod I    Bathing Min A  Supervision    Toileting Supervision   Independent    Bed Mobility  SBA  Supine to sit      Functional Transfers SBA  Sit-stand from bed   Mod i   Functional Mobility SBA,w/walker   Ambulating in room   Mod I with good tolerance    Balance Sitting:     Static:  Independent     Dynamic:SBA   Standing: SBA   Independent   with good tolerance    Activity Tolerance Fair with light activity   Good with ADL activity    Visual/  Perceptual Patient reports she has glasses but they don't really help                 Hand Dominance right    Strength ROM Additional Info:    RUE  4/5  WFL good  and wfl FMC/dexterity noted during ADL tasks       LUE 4/5  WFL good  and wfl FMC/dexterity noted during ADL tasks       Hearing: WFL   Sensation:  No c/o numbness or tingling   Tone: WFL       Comments: Upon arrival patient lying in bed . At end of session, patient sitting in chair  with call light and phone within reach, all lines and tubes intact. ALARM  ON       Overall patient demonstrated  decreased independence and safety during completion of ADL/functional transfer/mobility tasks. Pt would benefit from continued skilled OT to increase safety and independence with completion of ADL/IADL tasks for functional independence and quality of life. Assessment of current deficits   Functional mobility [x]  ADLs [x] Strength [x]  Cognition []  Functional transfers  [x] IADLs [x] Safety Awareness [x]  Endurance [x]  Fine Motor Coordination [] Balance [x] Vision/perception [] Sensation []   Gross Motor Coordination [] ROM [] Delirium []                  Motor Control []       Plan of Care: 1-3 days/week for 1-2 weeks PRN   [x]ADL retraining/adapted techniques and AE recommendations to increase functional independence within precautions                    [x]Energy conservation techniques to improve tolerance for selfcare routine   [x]Functional transfer/mobility training/DME recommendations for increased independence, safety and fall prevention         [x]Patient/family education to increase safety and functional independence             [x]Environmental modifications for safe mobility and completion of ADLs                             []Cognitive retraining ex's to improve problem solving skills & safe participation in ADLs/IADLs     []Sensory re-education techniques to improve extremity awareness, maintain skin integrity and improve hand function                             []Visual/Perceptual retraining  to improve body awareness and safety during transfers and ADLs  []Splinting/positioning needs to maintain joint/skin integrity and prevent contractures  [x]Therapeutic activity to improve functional performance during ADLs. [x]Therapeutic exercise to improve tolerance and functional strength for ADLs   [x]Balance retraining/tolerance tasks for facilitation of postural control with dynamic challenges during ADLs . []Neuromuscular re-education: facilitation of righting/equilibrium reactions, midline orientation, scapular stability/mobility, Normalization muscle     tone and facilitation active functional movement/Attention                         []Delirium prevention/treatment    [x]Positioning to improve functional independence  []Other:       Rehab Potential:  Good for established goals     Patient / Family Goal: return home       Patient and/or family were instructed on functional diagnosis, prognosis/goals and OT plan of care. Demonstrated good  understanding. Eval Complexity: Low      Time In:1050  Time Out: 1105        Min Units   OT Eval Low 97165  x 1   OT Eval Medium 05679      OT Eval High 64780       OT Re-Eval [id number]       Therapeutic Ex 36916       Therapeutic Activities 62187       ADL/Self Care 93550       Orthotic Management 68152       Neuro Re-Ed 67148       Non-Billable Time          Evaluation Time includes thorough review of current medical information, gathering information on past medical history/social history and prior level of function, completion of standardized testing/informal observation of tasks, assessment of data and education on plan of care and goals.             Pura Gimenez OTR/L [id number]

## 2021-01-28 NOTE — PROGRESS NOTES
Physician Progress Note      Radha Novoa  [facility] #:                  [id number]  :                       1940  ADMIT DATE:       2021 7:25 PM  [address] [place] DATE:  RESPONDING  PROVIDER #:        Nakul Newell MD          QUERY TEXT:    Patient admitted with COPD exacerbation. Noted documentation of acute   respiratory failure in the H&P and IM progress note. In order to support the   diagnosis of acute respiratory failure, please include additional clinical   indicators in your documentation. Or please document if the diagnosis of   acute respiratory failure has been ruled out after further study. The medical record reflects the following:  Risk Factors: COPD  Clinical Indicators: Per Progress note  \". Ulus Reusing Ulus Reusing Acute on chronic respiratory   failure: wears 02 at baseline. H/o COPD. Ulus Reusing Ulus Reusing \" Upon arrival to ED HR: 73 RR: 18   SpO2: 89% on RA patient placed on 3LPM and SpO2 to 96%. Per ED provider note. \"... -Patient is on 2L NC at baseline? Pulmonary  Effort: No respiratory   distress. Ulus Reusing Ulus Reusing \"\"  Treatment: Duoneb, Oxygen    Acute Respiratory Failure Clinical Indicators per  MS-DRG Training Guide and   Quick Reference Guide:  pO2 < 60 mmHg or SpO2 (pulse oximetry) < 91% breathing room air  pCO2 > 50 and pH < 7.35  P/F ratio (pO2 / FIO2) < 300  pO2 decrease or pCO2 increase by 10 mmHg from baseline (if known)  Supplemental oxygen of 40% or more  Presence of respiratory distress, tachypnea, dyspnea, shortness of breath,   wheezing  Unable to speak in complete sentences  Use of accessory muscles to breathe  Extreme anxiety and feeling of impending doom  Tripod position  Confusion/altered mental status/obtunded  Options provided:  -- Acute Respiratory Failure as evidenced by, Please document evidence.   -- Acute Respiratory Failure ruled out after study  -- Chronic Respiratory Failure only  -- Other - I will add my own diagnosis  -- Disagree - Not applicable / Not valid  -- Disagree - Clinically unable to determine / Unknown  -- Refer to Clinical Documentation Reviewer    PROVIDER RESPONSE TEXT:    This patient is in acute respiratory failure as evidenced by Low Oxygen   saturation on room air, COPD, abnormal cxr with left basilar infiltrate and   effusion affecting ability to oxygenate. Query created by: Kira Gould on 1/28/2021 10:37 AM      QUERY TEXT:    Patient admitted with COPD exacerbation. Documentation reflects Sepsis in the   H&P. If possible, please document in the progress notes and discharge summary   if Sepsis was: The medical record reflects the following:  Risk Factors: COPD  Clinical Indicators: Per H&P \". ..possible pneumonia. Patient also has a   leukocytosis, tachycardia. Melania Talamantes \" Per IM progress note 1/27 \"? Pneumonia -   Discontinue IV antibiotics of Rocephin and Doxycycline - procalcitonin   negative. Melania Talamantes Possible pneumonia: ruled out. Melania Talamantes \" Initial WBC: 17.6 Lactic: 0.9,   Procalcitonin: 0.06 Temp: 98.0 HR: 73, RR: 18.  Treatment: Rocephin Doxycycline  one dose  Options provided:  -- Sepsis ruled out after study  -- Sepsis treated and resolved  -- Sepsis confirmed after study  -- Other - I will add my own diagnosis  -- Disagree - Not applicable / Not valid  -- Disagree - Clinically unable to determine / Unknown  -- Refer to Clinical Documentation Reviewer    PROVIDER RESPONSE TEXT:    Sepsis ruled out after study.     Query created by: Kira Gould on 1/28/2021 10:42 AM      Electronically signed by:  Cristóbal Perry MD 1/28/2021 1:27 PM

## 2021-01-29 LAB
ALBUMIN SERPL-MCNC: 3.9 G/DL (ref 3.5–5.2)
ALP BLD-CCNC: 104 U/L (ref 35–104)
ALT SERPL-CCNC: 25 U/L (ref 0–32)
ANION GAP SERPL CALCULATED.3IONS-SCNC: 9 MMOL/L (ref 7–16)
AST SERPL-CCNC: 35 U/L (ref 0–31)
BILIRUB SERPL-MCNC: 0.3 MG/DL (ref 0–1.2)
BUN BLDV-MCNC: 23 MG/DL (ref 8–23)
CALCIUM SERPL-MCNC: 9 MG/DL (ref 8.6–10.2)
CHLORIDE BLD-SCNC: 102 MMOL/L (ref 98–107)
CO2: 29 MMOL/L (ref 22–29)
CREAT SERPL-MCNC: 0.8 MG/DL (ref 0.5–1)
GFR AFRICAN AMERICAN: >60
GFR NON-AFRICAN AMERICAN: >60 ML/MIN/1.73
GLUCOSE BLD-MCNC: 134 MG/DL (ref 74–99)
HCT VFR BLD CALC: 34.1 % (ref 34–48)
HEMOGLOBIN: 10.1 G/DL (ref 11.5–15.5)
INR BLD: 1.8
MCH RBC QN AUTO: 24.7 PG (ref 26–35)
MCHC RBC AUTO-ENTMCNC: 29.6 % (ref 32–34.5)
MCV RBC AUTO: 83.4 FL (ref 80–99.9)
METER GLUCOSE: 113 MG/DL (ref 74–99)
METER GLUCOSE: 124 MG/DL (ref 74–99)
METER GLUCOSE: 142 MG/DL (ref 74–99)
METER GLUCOSE: 148 MG/DL (ref 74–99)
PDW BLD-RTO: 19.3 FL (ref 11.5–15)
PLATELET # BLD: 782 E9/L (ref 130–450)
PMV BLD AUTO: 9.8 FL (ref 7–12)
POTASSIUM REFLEX MAGNESIUM: 4 MMOL/L (ref 3.5–5)
PROTHROMBIN TIME: 20.5 SEC (ref 9.3–12.4)
RBC # BLD: 4.09 E12/L (ref 3.5–5.5)
SODIUM BLD-SCNC: 140 MMOL/L (ref 132–146)
TOTAL PROTEIN: 7.9 G/DL (ref 6.4–8.3)
WBC # BLD: 17.1 E9/L (ref 4.5–11.5)

## 2021-01-29 PROCEDURE — 80053 COMPREHEN METABOLIC PANEL: CPT

## 2021-01-29 PROCEDURE — 82962 GLUCOSE BLOOD TEST: CPT

## 2021-01-29 PROCEDURE — 94660 CPAP INITIATION&MGMT: CPT

## 2021-01-29 PROCEDURE — 94640 AIRWAY INHALATION TREATMENT: CPT

## 2021-01-29 PROCEDURE — 6360000002 HC RX W HCPCS: Performed by: INTERNAL MEDICINE

## 2021-01-29 PROCEDURE — 6370000000 HC RX 637 (ALT 250 FOR IP): Performed by: FAMILY MEDICINE

## 2021-01-29 PROCEDURE — 85610 PROTHROMBIN TIME: CPT

## 2021-01-29 PROCEDURE — 2580000003 HC RX 258: Performed by: INTERNAL MEDICINE

## 2021-01-29 PROCEDURE — 6370000000 HC RX 637 (ALT 250 FOR IP): Performed by: INTERNAL MEDICINE

## 2021-01-29 PROCEDURE — 36415 COLL VENOUS BLD VENIPUNCTURE: CPT

## 2021-01-29 PROCEDURE — APPSS30 APP SPLIT SHARED TIME 16-30 MINUTES: Performed by: NURSE PRACTITIONER

## 2021-01-29 PROCEDURE — 99233 SBSQ HOSP IP/OBS HIGH 50: CPT | Performed by: FAMILY MEDICINE

## 2021-01-29 PROCEDURE — 2060000000 HC ICU INTERMEDIATE R&B

## 2021-01-29 PROCEDURE — 85027 COMPLETE CBC AUTOMATED: CPT

## 2021-01-29 PROCEDURE — 2700000000 HC OXYGEN THERAPY PER DAY

## 2021-01-29 RX ORDER — WARFARIN SODIUM 5 MG/1
5 TABLET ORAL
Status: COMPLETED | OUTPATIENT
Start: 2021-01-29 | End: 2021-01-29

## 2021-01-29 RX ADMIN — IPRATROPIUM BROMIDE AND ALBUTEROL SULFATE 1 AMPULE: .5; 3 SOLUTION RESPIRATORY (INHALATION) at 08:23

## 2021-01-29 RX ADMIN — IPRATROPIUM BROMIDE AND ALBUTEROL SULFATE 1 AMPULE: .5; 3 SOLUTION RESPIRATORY (INHALATION) at 13:28

## 2021-01-29 RX ADMIN — PANTOPRAZOLE SODIUM 40 MG: 40 TABLET, DELAYED RELEASE ORAL at 08:59

## 2021-01-29 RX ADMIN — IPRATROPIUM BROMIDE AND ALBUTEROL SULFATE 1 AMPULE: .5; 3 SOLUTION RESPIRATORY (INHALATION) at 21:00

## 2021-01-29 RX ADMIN — METHYLPREDNISOLONE SODIUM SUCCINATE 40 MG: 40 INJECTION, POWDER, LYOPHILIZED, FOR SOLUTION INTRAMUSCULAR; INTRAVENOUS at 20:12

## 2021-01-29 RX ADMIN — INSULIN LISPRO 1 UNITS: 100 INJECTION, SOLUTION INTRAVENOUS; SUBCUTANEOUS at 16:40

## 2021-01-29 RX ADMIN — ARFORMOTEROL TARTRATE 15 MCG: 15 SOLUTION RESPIRATORY (INHALATION) at 08:23

## 2021-01-29 RX ADMIN — SODIUM CHLORIDE, PRESERVATIVE FREE 10 ML: 5 INJECTION INTRAVENOUS at 20:12

## 2021-01-29 RX ADMIN — INSULIN LISPRO 1 UNITS: 100 INJECTION, SOLUTION INTRAVENOUS; SUBCUTANEOUS at 20:12

## 2021-01-29 RX ADMIN — METHYLPREDNISOLONE SODIUM SUCCINATE 40 MG: 40 INJECTION, POWDER, LYOPHILIZED, FOR SOLUTION INTRAMUSCULAR; INTRAVENOUS at 08:58

## 2021-01-29 RX ADMIN — PANTOPRAZOLE SODIUM 40 MG: 40 TABLET, DELAYED RELEASE ORAL at 20:12

## 2021-01-29 RX ADMIN — SODIUM CHLORIDE, PRESERVATIVE FREE 10 ML: 5 INJECTION INTRAVENOUS at 08:59

## 2021-01-29 RX ADMIN — IPRATROPIUM BROMIDE AND ALBUTEROL SULFATE 1 AMPULE: .5; 3 SOLUTION RESPIRATORY (INHALATION) at 17:03

## 2021-01-29 RX ADMIN — WARFARIN SODIUM 5 MG: 5 TABLET ORAL at 18:33

## 2021-01-29 RX ADMIN — ARFORMOTEROL TARTRATE 15 MCG: 15 SOLUTION RESPIRATORY (INHALATION) at 21:00

## 2021-01-29 RX ADMIN — LISINOPRIL 20 MG: 20 TABLET ORAL at 08:59

## 2021-01-29 NOTE — CARE COORDINATION
1/29/2021 Social Work Discharge Planning:SW met with Pt. Continues to decline HHC needs. [facility] 18/24. Resides with daughter. Pt is on 2lo2 here and uses this via [facility] DME at home.  Electronically signed by AGUSTÍN Alvarado on 1/29/2021 at 9:19 AM

## 2021-01-29 NOTE — PROGRESS NOTES
Date: 1/28/2021    Time: 9:08 PM    Patient Placed On BIPAP/CPAP/ Non-Invasive Ventilation? Yes    If no must comment. Facial area red/color change? No           If YES are Blister/Lesion present? No   If yes must notify nursing staff  BIPAP/CPAP skin barrier?   Yes    Skin barrier type:mepilexlite       Comments:        Kira Glass

## 2021-01-29 NOTE — PROGRESS NOTES
normal air movement, no respiratory distress  Cardiovascular: normal rate, normal S1 and S2 and no rubs, gallops, or murmur noted. Abdomen: soft, non-tender, non-distended, normal bowel sounds, no rebound, guarding, rigidity noted  Extremities: no cyanosis, no clubbing and no edema  Neurologic: no cranial nerve deficit and speech normal      Recent Labs     01/26/21 2115 01/27/21  0415    143   K 3.4* 4.2    104   CO2 31* 29   BUN 14 15   CREATININE 0.8 0.8   GLUCOSE 138* 216*   CALCIUM 8.8 8.4*       Recent Labs     01/26/21 2115   ALKPHOS 97   PROT 7.0   LABALBU 3.4*   BILITOT 0.3   AST 11   ALT 7       Recent Labs     01/26/21 2115 01/27/21 0415   WBC 17.6* 11.9*   RBC 3.89 3.86   HGB 9.5* 9.5*   HCT 31.8* 31.2*   MCV 81.7 80.8   MCH 24.4* 24.6*   MCHC 29.9* 30.4*   RDW 18.7* 18.7*   * 675*   MPV 9.8 9.7      Ref. Range 1/26/2021 21:15 1/27/2021 09:42   INR Unknown 4.0 3.4      Ref. Range 1/27/2021 04:15   Procalcitonin Latest Ref Range: 0.00 - 0.08 ng/mL 0.06         Radiology:   XR CHEST PORTABLE   Final Result   Left basilar atelectasis versus asymmetrical edema or pneumonia. Small left   pleural effusion. Assessment:  Active Problems:    PNA (pneumonia)  Resolved Problems:    * No resolved hospital problems. *      Plan:  1. Acute on Chronic respiratory failure with hypoxia- Contniues with wheeze throughout. 2/2 COPD exacerbating. COVID-10 negative. Procal negative. Currently on 2L NC which is baseline per pt. Maintain SPO2>92%. Continue steroids/nebs. Encourage incentive spirometry. 1/28/2021 ECHO  EF 60%. Left atrium mildly dilated. Focal Calcification mitral valve leaflets. Mild mitral annular calcification. Mild mitral regurgitation. Mild mitral stenosis. Aortic valve mildly sclerotic.     2. ?Pneumonia- 1/26/2021 CXR Left basilar atelectasis vs. Asymmetrical edema or Pneumonia. Small left pleural effusion. received Doxycycline/Ceftriaxone in ED course.  Procalcitonin 0.06. Continue off Abx. 3. COPD Exacerbation- Continue Solumedrol/Nebs/brovana. 4. Supratherapeutic INR- Home regimen Warfarin on hold. INR 4.0>3.4>1.8  Continue Daily INR. Monitor closely for bleeding. Pharmacy consult for dosing. .     5. Leukocytosis- Reactive? WBC 17.6>11.9. With improvement this morning. Afebrile. Procal negative. Monitor off abx.     6. 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 INR 1.8. Will resume with INR<3. Pharmacy consulted for dosing of coumadin. 7. Recent Cholecystectomy- Approximately 2 weeks prior to admission. Performed at [address] [place] [address],[address]- PT/OT for evaluation. 9.   Disposition- Possible DC next 24 hours. Currently at baseline oxygen supplementation. Refusing home Health care. Case Management/Social work following. NOTE: This report was transcribed using voice recognition software. Every effort was made to ensure accuracy; however, inadvertent computerized transcription errors may be present. Electronically signed by Willian Blanco CNP on 1/29/2021 at 11:23 AM

## 2021-01-30 ENCOUNTER — APPOINTMENT (OUTPATIENT)
Dept: CT IMAGING | Age: 81
DRG: 190 | End: 2021-01-30
Payer: MEDICARE

## 2021-01-30 LAB
ADENOVIRUS BY PCR: NOT DETECTED
ALBUMIN SERPL-MCNC: 3.6 G/DL (ref 3.5–5.2)
ALP BLD-CCNC: 91 U/L (ref 35–104)
ALT SERPL-CCNC: 23 U/L (ref 0–32)
ANION GAP SERPL CALCULATED.3IONS-SCNC: 8 MMOL/L (ref 7–16)
AST SERPL-CCNC: 19 U/L (ref 0–31)
BACTERIA: ABNORMAL /HPF
BILIRUB SERPL-MCNC: 0.2 MG/DL (ref 0–1.2)
BILIRUBIN URINE: NEGATIVE
BLOOD, URINE: ABNORMAL
BORDETELLA PARAPERTUSSIS BY PCR: NOT DETECTED
BORDETELLA PERTUSSIS BY PCR: NOT DETECTED
BUN BLDV-MCNC: 24 MG/DL (ref 8–23)
CALCIUM SERPL-MCNC: 8.4 MG/DL (ref 8.6–10.2)
CHLAMYDOPHILIA PNEUMONIAE BY PCR: NOT DETECTED
CHLORIDE BLD-SCNC: 101 MMOL/L (ref 98–107)
CLARITY: CLEAR
CO2: 29 MMOL/L (ref 22–29)
COLOR: YELLOW
CORONAVIRUS 229E BY PCR: NOT DETECTED
CORONAVIRUS HKU1 BY PCR: NOT DETECTED
CORONAVIRUS NL63 BY PCR: NOT DETECTED
CORONAVIRUS OC43 BY PCR: NOT DETECTED
CREAT SERPL-MCNC: 0.8 MG/DL (ref 0.5–1)
GFR AFRICAN AMERICAN: >60
GFR NON-AFRICAN AMERICAN: >60 ML/MIN/1.73
GLUCOSE BLD-MCNC: 148 MG/DL (ref 74–99)
GLUCOSE URINE: NEGATIVE MG/DL
HUMAN METAPNEUMOVIRUS BY PCR: NOT DETECTED
HUMAN RHINOVIRUS/ENTEROVIRUS BY PCR: NOT DETECTED
INFLUENZA A BY PCR: NOT DETECTED
INFLUENZA B BY PCR: NOT DETECTED
INR BLD: 1.7
KETONES, URINE: NEGATIVE MG/DL
LEUKOCYTE ESTERASE, URINE: NEGATIVE
METER GLUCOSE: 121 MG/DL (ref 74–99)
METER GLUCOSE: 124 MG/DL (ref 74–99)
METER GLUCOSE: 128 MG/DL (ref 74–99)
METER GLUCOSE: 154 MG/DL (ref 74–99)
MYCOPLASMA PNEUMONIAE BY PCR: NOT DETECTED
NITRITE, URINE: NEGATIVE
PARAINFLUENZA VIRUS 1 BY PCR: NOT DETECTED
PARAINFLUENZA VIRUS 2 BY PCR: NOT DETECTED
PARAINFLUENZA VIRUS 3 BY PCR: NOT DETECTED
PARAINFLUENZA VIRUS 4 BY PCR: NOT DETECTED
PH UA: 6 (ref 5–9)
POTASSIUM REFLEX MAGNESIUM: 4.6 MMOL/L (ref 3.5–5)
PRO-BNP: 3549 PG/ML (ref 0–450)
PROTEIN UA: NEGATIVE MG/DL
PROTHROMBIN TIME: 20.2 SEC (ref 9.3–12.4)
RBC UA: ABNORMAL /HPF (ref 0–2)
RESPIRATORY SYNCYTIAL VIRUS BY PCR: NOT DETECTED
SARS-COV-2, PCR: NOT DETECTED
SODIUM BLD-SCNC: 138 MMOL/L (ref 132–146)
SPECIFIC GRAVITY UA: 1.02 (ref 1–1.03)
TOTAL PROTEIN: 6.9 G/DL (ref 6.4–8.3)
UROBILINOGEN, URINE: 0.2 E.U./DL
WBC UA: ABNORMAL /HPF (ref 0–5)

## 2021-01-30 PROCEDURE — 97530 THERAPEUTIC ACTIVITIES: CPT

## 2021-01-30 PROCEDURE — 6360000002 HC RX W HCPCS: Performed by: NURSE PRACTITIONER

## 2021-01-30 PROCEDURE — 81001 URINALYSIS AUTO W/SCOPE: CPT

## 2021-01-30 PROCEDURE — 6360000002 HC RX W HCPCS: Performed by: INTERNAL MEDICINE

## 2021-01-30 PROCEDURE — 6370000000 HC RX 637 (ALT 250 FOR IP): Performed by: INTERNAL MEDICINE

## 2021-01-30 PROCEDURE — 2580000003 HC RX 258: Performed by: NURSE PRACTITIONER

## 2021-01-30 PROCEDURE — 2060000000 HC ICU INTERMEDIATE R&B

## 2021-01-30 PROCEDURE — 0202U NFCT DS 22 TRGT SARS-COV-2: CPT

## 2021-01-30 PROCEDURE — 6370000000 HC RX 637 (ALT 250 FOR IP): Performed by: FAMILY MEDICINE

## 2021-01-30 PROCEDURE — 83880 ASSAY OF NATRIURETIC PEPTIDE: CPT

## 2021-01-30 PROCEDURE — 6360000004 HC RX CONTRAST MEDICATION: Performed by: RADIOLOGY

## 2021-01-30 PROCEDURE — APPSS30 APP SPLIT SHARED TIME 16-30 MINUTES: Performed by: NURSE PRACTITIONER

## 2021-01-30 PROCEDURE — C9113 INJ PANTOPRAZOLE SODIUM, VIA: HCPCS | Performed by: NURSE PRACTITIONER

## 2021-01-30 PROCEDURE — 2580000003 HC RX 258: Performed by: INTERNAL MEDICINE

## 2021-01-30 PROCEDURE — 85610 PROTHROMBIN TIME: CPT

## 2021-01-30 PROCEDURE — 99233 SBSQ HOSP IP/OBS HIGH 50: CPT | Performed by: FAMILY MEDICINE

## 2021-01-30 PROCEDURE — 97110 THERAPEUTIC EXERCISES: CPT

## 2021-01-30 PROCEDURE — 71260 CT THORAX DX C+: CPT

## 2021-01-30 PROCEDURE — 80053 COMPREHEN METABOLIC PANEL: CPT

## 2021-01-30 PROCEDURE — 94660 CPAP INITIATION&MGMT: CPT

## 2021-01-30 PROCEDURE — 6370000000 HC RX 637 (ALT 250 FOR IP): Performed by: NURSE PRACTITIONER

## 2021-01-30 PROCEDURE — 2700000000 HC OXYGEN THERAPY PER DAY

## 2021-01-30 PROCEDURE — 82962 GLUCOSE BLOOD TEST: CPT

## 2021-01-30 PROCEDURE — 94640 AIRWAY INHALATION TREATMENT: CPT

## 2021-01-30 PROCEDURE — 36415 COLL VENOUS BLD VENIPUNCTURE: CPT

## 2021-01-30 RX ORDER — SODIUM CHLORIDE 9 MG/ML
10 INJECTION INTRAVENOUS 2 TIMES DAILY
Status: DISCONTINUED | OUTPATIENT
Start: 2021-01-30 | End: 2021-02-01 | Stop reason: HOSPADM

## 2021-01-30 RX ORDER — PANTOPRAZOLE SODIUM 40 MG/10ML
40 INJECTION, POWDER, LYOPHILIZED, FOR SOLUTION INTRAVENOUS 2 TIMES DAILY
Status: DISCONTINUED | OUTPATIENT
Start: 2021-01-30 | End: 2021-02-01 | Stop reason: HOSPADM

## 2021-01-30 RX ORDER — IPRATROPIUM BROMIDE AND ALBUTEROL SULFATE 2.5; .5 MG/3ML; MG/3ML
1 SOLUTION RESPIRATORY (INHALATION) 4 TIMES DAILY
Status: DISCONTINUED | OUTPATIENT
Start: 2021-01-30 | End: 2021-02-01 | Stop reason: HOSPADM

## 2021-01-30 RX ORDER — WARFARIN SODIUM 10 MG/1
10 TABLET ORAL
Status: COMPLETED | OUTPATIENT
Start: 2021-01-30 | End: 2021-01-30

## 2021-01-30 RX ORDER — BUDESONIDE 0.25 MG/2ML
250 INHALANT ORAL 2 TIMES DAILY
Status: DISCONTINUED | OUTPATIENT
Start: 2021-01-30 | End: 2021-02-01 | Stop reason: HOSPADM

## 2021-01-30 RX ORDER — GUAIFENESIN 400 MG/1
400 TABLET ORAL 4 TIMES DAILY PRN
Status: DISCONTINUED | OUTPATIENT
Start: 2021-01-30 | End: 2021-02-01 | Stop reason: HOSPADM

## 2021-01-30 RX ORDER — PANTOPRAZOLE SODIUM 40 MG/10ML
40 INJECTION, POWDER, LYOPHILIZED, FOR SOLUTION INTRAVENOUS 2 TIMES DAILY
Status: DISCONTINUED | OUTPATIENT
Start: 2021-01-30 | End: 2021-01-30

## 2021-01-30 RX ORDER — SODIUM CHLORIDE 9 MG/ML
10 INJECTION INTRAVENOUS DAILY
Status: DISCONTINUED | OUTPATIENT
Start: 2021-01-30 | End: 2021-01-30

## 2021-01-30 RX ADMIN — HYDRALAZINE HYDROCHLORIDE 10 MG: 20 INJECTION INTRAMUSCULAR; INTRAVENOUS at 22:52

## 2021-01-30 RX ADMIN — LISINOPRIL 20 MG: 20 TABLET ORAL at 08:13

## 2021-01-30 RX ADMIN — INSULIN LISPRO 1 UNITS: 100 INJECTION, SOLUTION INTRAVENOUS; SUBCUTANEOUS at 15:46

## 2021-01-30 RX ADMIN — IPRATROPIUM BROMIDE AND ALBUTEROL SULFATE 1 AMPULE: .5; 3 SOLUTION RESPIRATORY (INHALATION) at 10:37

## 2021-01-30 RX ADMIN — METHYLPREDNISOLONE SODIUM SUCCINATE 40 MG: 40 INJECTION, POWDER, LYOPHILIZED, FOR SOLUTION INTRAMUSCULAR; INTRAVENOUS at 08:13

## 2021-01-30 RX ADMIN — IPRATROPIUM BROMIDE AND ALBUTEROL SULFATE 1 AMPULE: .5; 3 SOLUTION RESPIRATORY (INHALATION) at 00:57

## 2021-01-30 RX ADMIN — SODIUM CHLORIDE, PRESERVATIVE FREE 10 ML: 5 INJECTION INTRAVENOUS at 22:24

## 2021-01-30 RX ADMIN — PANTOPRAZOLE SODIUM 40 MG: 40 TABLET, DELAYED RELEASE ORAL at 08:13

## 2021-01-30 RX ADMIN — PANTOPRAZOLE SODIUM 40 MG: 40 INJECTION, POWDER, FOR SOLUTION INTRAVENOUS at 18:04

## 2021-01-30 RX ADMIN — IPRATROPIUM BROMIDE AND ALBUTEROL SULFATE 1 AMPULE: 2.5; .5 SOLUTION RESPIRATORY (INHALATION) at 21:14

## 2021-01-30 RX ADMIN — IPRATROPIUM BROMIDE AND ALBUTEROL SULFATE 1 AMPULE: 2.5; .5 SOLUTION RESPIRATORY (INHALATION) at 16:06

## 2021-01-30 RX ADMIN — SODIUM CHLORIDE, PRESERVATIVE FREE 10 ML: 5 INJECTION INTRAVENOUS at 18:04

## 2021-01-30 RX ADMIN — WARFARIN SODIUM 10 MG: 10 TABLET ORAL at 18:04

## 2021-01-30 RX ADMIN — IOPAMIDOL 75 ML: 755 INJECTION, SOLUTION INTRAVENOUS at 14:21

## 2021-01-30 RX ADMIN — BUDESONIDE 250 MCG: 0.25 SUSPENSION RESPIRATORY (INHALATION) at 21:14

## 2021-01-30 RX ADMIN — ARFORMOTEROL TARTRATE 15 MCG: 15 SOLUTION RESPIRATORY (INHALATION) at 21:14

## 2021-01-30 RX ADMIN — SODIUM CHLORIDE, PRESERVATIVE FREE 10 ML: 5 INJECTION INTRAVENOUS at 08:13

## 2021-01-30 RX ADMIN — ARFORMOTEROL TARTRATE 15 MCG: 15 SOLUTION RESPIRATORY (INHALATION) at 10:37

## 2021-01-30 RX ADMIN — METHYLPREDNISOLONE SODIUM SUCCINATE 40 MG: 40 INJECTION, POWDER, LYOPHILIZED, FOR SOLUTION INTRAMUSCULAR; INTRAVENOUS at 22:24

## 2021-01-30 ASSESSMENT — PAIN SCALES - GENERAL
PAINLEVEL_OUTOF10: 7
PAINLEVEL_OUTOF10: 0

## 2021-01-30 ASSESSMENT — PAIN DESCRIPTION - LOCATION: LOCATION: FLANK

## 2021-01-30 NOTE — CONSULTS
Pulmonary Consultation    Admit Date: 1/26/2021  Requesting Physician: Cora Gamble MD    CC:  COPD exacerbation    HPI:  [de-identified] Yr old female current every day smoker of 1/2 - 1 ppd for 79 years with smoking 4-5 ppd for about 4-5 years with a PMH of chronic hypoxemic resp failure on 2L n/c at home, COPD on Anoro, FAISAL on CPAP (does not know pressure), HTN, PE/DVT on chronic coumadin. She was recently admitted to [facility] 2 weeks ago s/p choley and also the end of December for UGI/colonoscopy which was ok per pt. She follows with a pulmonologist in [place], [place]. The patient presented to the ED 1/26 with complaints of increased dyspnea, cough, chills and probable fevers (she didn't take her temp) that started approximately 1 week ago. She lives with her daughter and denies any sick contacts. States she has been tested for Covid 3 times, all negative. Today, her breathing remains increased but states it will come and go, mostly with activity. She has a dry cough. No fevers/chills here. She is on 2 L n/c which is her baseline. Wore bipap overnight but did not like it. PMH:    Past Medical History:   Diagnosis Date    COPD (chronic obstructive pulmonary disease) ([address]     DVT (deep venous thrombosis) (HCC)     Hypertension     Pulmonary embolism (HCC)      PSH:    Past Surgical History:   Procedure Laterality Date    CARPAL TUNNEL RELEASE Right     CHOLECYSTECTOMY      HYSTERECTOMY      partial    MASTECTOMY, BILATERAL  1988          Respiratory ROS: dyspnea, cough. Otherwise, a complete review of systems is undertaken and is negative.     Social History:  Social History     Socioeconomic History    Marital status:      Spouse name: Not on file    Number of children: Not on file    Years of education: Not on file    Highest education level: Not on file   Occupational History    Not on file   Social Needs    Financial resource strain: Not on file    Food insecurity     Worry: Not on file     Inability: Not on file    Transportation needs     Medical: Not on file     Non-medical: Not on file   Tobacco Use    Smoking status: Current Every Day Smoker     Packs/day: 1.00    Smokeless tobacco: Never Used   Substance and Sexual Activity    Alcohol use: Not Currently    Drug use: Never    Sexual activity: Not on file   Lifestyle    Physical activity     Days per week: Not on file     Minutes per session: Not on file    Stress: Not on file   Relationships    Social connections     Talks on phone: Not on file     Gets together: Not on file     Attends Jainism service: Not on file     Active member of club or organization: Not on file     Attends meetings of clubs or organizations: Not on file     Relationship status: Not on file    Intimate partner violence     Fear of current or ex partner: Not on file     Emotionally abused: Not on file     Physically abused: Not on file     Forced sexual activity: Not on file   Other Topics Concern    Not on file   Social History Narrative    Not on file   No ETOH  No rec drugs  Office work - retired    Family History:  History reviewed. No pertinent family history.     Medications:     dextrose        warfarin  10 mg Oral Once    insulin lispro  0-6 Units Subcutaneous TID WC    insulin lispro  0-3 Units Subcutaneous Nightly    sodium chloride flush  10 mL Intravenous 2 times per day    lisinopril  20 mg Oral Daily    pantoprazole  40 mg Oral BID    methylPREDNISolone  40 mg Intravenous Q12H    ipratropium-albuterol  1 ampule Inhalation Q4H WA    Arformoterol Tartrate  15 mcg Nebulization BID    warfarin (COUMADIN) daily dosing (placeholder)   Other RX Placeholder         Vitals:    VITALS:  BP (!) 179/109   Pulse 101   Temp 96.8 °F (36 °C) (Temporal)   Resp 18   Ht 5' 5\" (1.651 m)   Wt 191 lb (86.6 kg)   SpO2 97%   BMI 31.78 kg/m²   24HR INTAKE/OUTPUT:      Intake/Output Summary (Last 24 hours) at 1/30/2021 1127  Last data filed at 2021 0631  Gross per 24 hour   Intake 160 ml   Output 950 ml   Net -790 ml     CURRENT PULSE OXIMETRY:  SpO2: 97 %  24HR PULSE OXIMETRY RANGE:  SpO2  Av.3 %  Min: 97 %  Max: 98 %      EXAM:  General: No distress. Eyes: PERRL. No sclera icterus. No conjunctival injection. ENT: No discharge. Pharynx clear. Neck: Trachea midline. Normal thyroid. Resp: No accessory muscle use. Diminished with bibasilar crackles and insp/exp bilateral wheezing. No rhonchi. CV: Regular rate. Regular rhythm. No mumur or rub. ABD: Non-tender. Non-distended. No masses. No organmegaly. Normal bowel sounds. Skin: Warm and dry. No nodule on exposed extremities. No rash on exposed extremities. Lymph: No cervical LAD. No supraclavicular LAD. Ext: No joint deformity. No clubbing. No cyanosis. No edema  Neuro: Awake. Follows commands      Lab Results:  CBC:   Recent Labs     21  1200   WBC 17.1*   HGB 10.1*   HCT 34.1   MCV 83.4   *     BMP:   Recent Labs     21  1200 21  0325    138   K 4.0 4.6    101   CO2 29 29   BUN 23 24*   CREATININE 0.8 0.8     LFT:   Recent Labs     21  1200 21  0325   ALKPHOS 104 91   ALT 25 23   AST 35* 19   PROT 7.9 6.9   BILITOT 0.3 0.2   LABALBU 3.9 3.6     PT/INR:   Recent Labs     21  1035 21  1200 21  0325   PROTIME 26.2* 20.5* 20.2*   INR 2.2 1.8 1.7     2021 Echo: Summary  Left ventricular internal dimensions were normal in diastole and systole. Indeterminate diastolic function. No regional wall motion abnormalities seen. Normal left ventricular ejection fraction. The left atrium is mildly dilated. Focal calcification mitral valve leaflets. Mild mitral annular calcification. Mild mitral regurgitation is present. Mild mitral stenosis . The  aortic valve appears mildly sclerotic.       Films:  Xr Chest Portable 2021 FINDINGS: The cardiac silhouette is normal.  There is moderate thoracic aortic calcification. There is patchy opacity in the left base. Left costophrenic angle is obscured. Pulmonary vessels are normal in caliber. No pneumothorax. - Left basilar atelectasis versus asymmetrical edema or pneumonia. Small left pleural effusion. Assessment:  · COPD exacerbation  · Small L pleural effusion  · FAISAL  · Smoker  · HTN - uncontrolled  · Hx DVT/PE on chronic OAC  · COVID negative 01/26      Plan:  · CT Chest to r/o PE ordered - although low risk given supra-therapeutic INR on admission, 4.0  · Check respiratory panel with PCR Covid  · Continue with IV steroids  · Add Pulmicort neb  · Continue Brovana, duonebs  · Procal 0.06  · Received 1 dose of Rocephin/Doxy   · Neg blood cultures  · Elevated BNP  3.969 - repeat today  · Elevated WBC - check urine  · Elevated glucose, steroid induced       Thank you for allowing us to see this patient in consultation. Please contact us with any questions. LUCIO Isidro    Electronically signed by YOSEPH Isidro CNP on 1/30/2021 at 11:27 AM    Attending Attestation Note:    Patient seen and examined with NP. I agree with above.     In addition, the following apply:    - nebs  - steroids  - O2, IS  - await D/C planning   - respiratory panel   - CT chest     Oneita Larger  1/30/2021  5:25 PM

## 2021-01-30 NOTE — PROGRESS NOTES
Pharmacy Consultation Note  (Anticoagulant Dosing and Monitoring)    Initial consult date: 1/27  Consulting physician: Bonita Sandoval    Allergies:  Carafate [sucralfate]    [de-identified] y.o. female      Ht Readings from Last 1 Encounters:   01/26/21 5' 5\" (1.651 m)     Wt Readings from Last 1 Encounters:   01/30/21 191 lb (86.6 kg)         Warfarin Indication Target   INR [place] Dose  (if applicable) Diet/Feeding Tube   History of DVT/PE 2-3 5 mg on Tuesday and Thursday; 10 mg on all other days  (per patient interview)      Vitamin K or Blood product  Administration Date               Warfarin drug-drug interactions  Start  Stop Home Med?  Comments    Methylprednisolone 1/26  no May enhance anticoagulatory effect of warfarin                         Hepatic Function Panel:                            Lab Results   Component Value Date    ALKPHOS 91 01/30/2021    ALT 23 01/30/2021    AST 19 01/30/2021    PROT 6.9 01/30/2021    BILITOT 0.2 01/30/2021    LABALBU 3.6 01/30/2021       Date Warfarin Dose INR Heparin or LMWH HBG/HCT PLT Comment   1/27 No dose 3.4 X 9.5/31.2 675    1/28 5 mg 2.2 X X X    1/29 5 mg 1.8 X 10.1/34.1 782    1/30 10 mg 1.7 X X X                                 Assessment:  · Patient is a [de-identified]year old female on warfarin for history of DVT/PE  · Per patient, she takes warfarin 5 mg on Tuesday and Thursday and warfarin 10 mg all other days of the week  · INR upon admission on 1/26 was 4  · INR goal = 2-3; INR today = 1.7 (subtherapeutic, decreased from 1.8 yesterday)    Plan:  · Will give warfarin 10 mg x 1 dose again today  · Daily PT/INR until the INR is stable within the therapeutic range  · Pharmacist will follow and monitor/adjust dosing as necessary    Lorene Gutierrez PharmD  1/30/2021  9:56 AM

## 2021-01-30 NOTE — PROGRESS NOTES
[id number] Hospitalist   Progress Note    Admitting Date and Time: 1/26/2021  7:25 PM  Admit Dx: PNA (pneumonia) [J18.9]    Subjective:    Pt sitting up in bed in no acute distress. States she has to go down for testing. Breathing feels better now. Continue on 2L NC. Per RN: For CT of chest today. ROS: denies fever, chills, cp,  n/v, HA unless stated above.      warfarin  10 mg Oral Once    insulin lispro  0-6 Units Subcutaneous TID WC    insulin lispro  0-3 Units Subcutaneous Nightly    sodium chloride flush  10 mL Intravenous 2 times per day    lisinopril  20 mg Oral Daily    pantoprazole  40 mg Oral BID    methylPREDNISolone  40 mg Intravenous Q12H    ipratropium-albuterol  1 ampule Inhalation Q4H WA    Arformoterol Tartrate  15 mcg Nebulization BID    warfarin (COUMADIN) daily dosing (placeholder)   Other RX Placeholder         guaiFENesin, 400 mg, 4x Daily PRN      glucose, 15 g, PRN      dextrose, 12.5 g, PRN      glucagon (rDNA), 1 mg, PRN      dextrose, 100 mL/hr, PRN      sodium chloride flush, 10 mL, PRN      potassium chloride, 40 mEq, PRN    Or      potassium alternative oral replacement, 40 mEq, PRN    Or      potassium chloride, 10 mEq, PRN      promethazine, 12.5 mg, Q6H PRN    Or      ondansetron, 4 mg, Q6H PRN      polyethylene glycol, 17 g, Daily PRN      acetaminophen, 650 mg, Q6H PRN    Or      acetaminophen, 650 mg, Q6H PRN      hydrALAZINE, 10 mg, Q6H PRN         Objective:    BP (!) 179/109   Pulse 101   Temp 96.8 °F (36 °C) (Temporal)   Resp 18   Ht 5' 5\" (1.651 m)   Wt 191 lb (86.6 kg)   SpO2 97%   BMI 31.78 kg/m²   General Appearance: alert and oriented to person, place and time and in no acute distress  Skin: warm and dry  Head: normocephalic and atraumatic  Eyes: pupils equal, round, and reactive to light, extraocular eye movements intact, conjunctivae normal  Neck: neck supple and non tender without mass   Pulmonary/Chest: Ins/Exp wheeze auscultation bilaterally- no rhonchi, normal air movement, no respiratory distress  Cardiovascular: normal rate, normal S1 and S2 and no rubs, gallops, or murmur noted. Abdomen: soft, non-tender, non-distended, normal bowel sounds, no rebound, guarding, rigidity noted  Extremities: no cyanosis, no clubbing and no edema  Neurologic: no cranial nerve deficit and speech normal      Recent Labs     01/29/21  1200 01/30/21  0325    138   K 4.0 4.6    101   CO2 29 29   BUN 23 24*   CREATININE 0.8 0.8   GLUCOSE 134* 148*   CALCIUM 9.0 8.4*       Recent Labs     01/29/21  1200 01/30/21  0325   ALKPHOS 104 91   PROT 7.9 6.9   LABALBU 3.9 3.6   BILITOT 0.3 0.2   AST 35* 19   ALT 25 23       Recent Labs     01/29/21  1200   WBC 17.1*   RBC 4.09   HGB 10.1*   HCT 34.1   MCV 83.4   MCH 24.7*   MCHC 29.6*   RDW 19.3*   *   MPV 9.8      Ref. Range 1/26/2021 21:15 1/27/2021 09:42   INR Unknown 4.0 3.4      Ref. Range 1/27/2021 04:15   Procalcitonin Latest Ref Range: 0.00 - 0.08 ng/mL 0.06         Radiology:   XR CHEST PORTABLE   Final Result   Left basilar atelectasis versus asymmetrical edema or pneumonia. Small left   pleural effusion. CT CHEST PULMONARY EMBOLISM W CONTRAST    (Results Pending)       Assessment:  Active Problems:    PNA (pneumonia)    Chronic respiratory failure with hypoxia (HCC)    COPD exacerbation (HCC)    Supratherapeutic INR  Resolved Problems:    * No resolved hospital problems. *      Plan:  1. Acute on Chronic respiratory failure with hypoxia- Contniues with wheeze throughout. 2/2 COPD exacerbating. COVID-10 negative. Procal negative. Currently on 2L NC which is baseline per pt. Maintain SPO2>92%. Continue steroids/nebs. Encourage incentive spirometry. 1/28/2021 ECHO  EF 60%. Left atrium mildly dilated. Focal Calcification mitral valve leaflets. Mild mitral annular calcification. Mild mitral regurgitation. Mild mitral stenosis. Aortic valve mildly sclerotic. Respiratory panel pending. Pulmonology input appreciated. 2. ?Pneumonia- 1/26/2021 CXR Left basilar atelectasis vs. Asymmetrical edema or Pneumonia. Small left pleural effusion. received Doxycycline/Ceftriaxone in ED course. Procalcitonin 0.06. Continue off Abx. 3. COPD Exacerbation- Continue Solumedrol/Nebs/brovana. 4. Supratherapeutic INR- Home regimen Warfarin on hold. INR 4.0>3.4>1.8  Continue Daily INR. Monitor closely for bleeding. Pharmacy consult for dosing. .     5. Leukocytosis- Reactive/steroids/infectious? WBC 17.6>11.9>17.1 trending back up. UA/Culture. Respiratory Panel/Fungus culture pending. . Afebrile. Procal/Blood cultures negative. Monitor off abx.     6. 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 INR 1.7. Will resume with INR<3. Pharmacy consulted for dosing of coumadin. 7. Recent Cholecystectomy- Approximately 2 weeks prior to admission. Performed at [address] [place] [address]- PT/OT for evaluation. 9.   Disposition- Leukocytosis. noted. Laboratory testing and imaging pending. Currently at baseline oxygen supplementation. Refusing home Health care. Case Management/Social work following. NOTE: This report was transcribed using voice recognition software. Every effort was made to ensure accuracy; however, inadvertent computerized transcription errors may be present. Electronically signed by Twin Blanco CNP on 1/30/2021 at 10:57 AM

## 2021-01-30 NOTE — PROGRESS NOTES
Physical Therapy    Facility/Department: [address] INTERNAL MEDICINE 2  Treatment note    NAME: Kaiden Tucker  : 1940  MRN: [id number]    Date of Service: 2021    Patient Diagnosis(es): The primary encounter diagnosis was Pneumonia due to organism. Diagnoses of COPD exacerbation ([facility] [address] Hypomagnesemia, Chronic respiratory failure with hypoxia ([facility] [address] Anemia, unspecified type, Hypokalemia, and Supratherapeutic INR were also pertinent to this visit. has a past medical history of COPD (chronic obstructive pulmonary disease) ([facility] [address] DVT (deep venous thrombosis) ([facility] [address] Hypertension, and Pulmonary embolism ([facility] [address] has a past surgical history that includes Cholecystectomy; Mastectomy, bilateral (); Carpal tunnel release (Right); and Hysterectomy. Referring Provider:  KATYA Phoenix    Evaluating PT:  Naila Stanford PT, DPT [id number]    Room #:  7791/8057-X  Diagnosis:  Pneumonia  Precautions:  Fall risk, O2, impaired vision. Can only see shapes. Equipment Needs:  None. Pt reported owing a cane. SUBJECTIVE:    Pt lives with her daughter in a 1 story home with 2+1 stairs to enter and 1 rail. Bed and bath is on first floor. Pt ambulated with cane PTA. Pt reported being independent with functional mobility. OBJECTIVE:   Initial Evaluation  Date:  Treatment  21 Short Term/ Long Term   Goals   Was pt agreeable to Eval/treatment? yes yes    Does pt have pain?  None reported No complaints    Bed Mobility  Rolling: Supervision  Supine to sit: supervision  Sit to supine: NT  Scooting: supervision to sitting EOB NT independent   Transfers Sit to stand: SBA  Stand to sit: SBA  Stand pivot: SBA with w/w Sit to stand: SBA  Stand to sit: SBA  Stand Pivot: SBA without A/D independent   Ambulation   40 feet with w/w SBA 40 feet x 1 using WW for support CG/SBA for balance 100+ feet with SPC modified independent    Stair negotiation: ascended and descended  NT NT 3 steps with 1 rail modified independent   ROM BLE:  WFL     Strength BLE:  Grossly 4/5     Balance Sitting EOB:  independent  Dynamic Standing:  SBA with w/w  Sitting EOB:  independent  Dynamic Standing:  Independent with SPC   AM-PAC 6 Clicks 91/11 80/67        Pt is alert and able to follow instruction  Balance: fair/fair minus dynamic using [employer] for support    Pt performed therapeutic exercise of the following: seated B ankle pumps AROM; B LAQ's/hamstring curls, hip ABd/ADd with minimal manual resistance x 20    Patient education  Pt was educated on exercise promoting circulation and strengthening, UE usage to assist with transfers, gait promoting posture    Patient response to education:   Pt verbalized understanding Pt demonstrated skill Pt requires further education in this area   yes With prompt for transfers yes     ASSESSMENT:   Comments: Pt found sitting EOB with call light on, agreed to rx. Pt transferred to a bedside chair, exercise performed, Nurse present and performed AM assessment, BP elevated but Nurse ok to proceed with Rx. Gait performed in the room, mary slow and inconsistent, Pt with brief unsteadiness, displayed no loss of balance. Pt short of breath after activity on 02, was wheezing, 02 saturation 98% after activity. Treatment: Pt practiced and was instructed in the following treatment: exercise, transfers, gait    Pt was left in a bedside chair with call light in reach. Chair/bed alarm: chair alarm active    Time in 0755   Time out 0820   Total Treatment Time 25 minutes   CPT codes:     Therapeutic activities 15775 12 minutes   Therapeutic exercises 79650 13 minutes       Pt is making fair progress toward established Physical Therapy goals as per functional mobility performed and exercise participation. Continue with physical therapy current plan of care.     [facility]   License Number: [id number]

## 2021-01-30 NOTE — PROGRESS NOTES
Date: 1/29/2021    Time: 10:50 PM    Patient Placed On BIPAP/CPAP/ Non-Invasive Ventilation? Yes    If no must comment. Facial area red/color change? No           If YES are Blister/Lesion present? No   If yes must notify nursing staff  BIPAP/CPAP skin barrier?   Yes    Skin barrier type:mepilexlite       Comments:        Reynaldo Eddy

## 2021-01-31 LAB
BASOPHILS ABSOLUTE: 0.01 E9/L (ref 0–0.2)
BASOPHILS RELATIVE PERCENT: 0.1 % (ref 0–2)
EOSINOPHILS ABSOLUTE: 0 E9/L (ref 0.05–0.5)
EOSINOPHILS RELATIVE PERCENT: 0 % (ref 0–6)
FOLATE: 15.4 NG/ML (ref 4.8–24.2)
HCT VFR BLD CALC: 32.5 % (ref 34–48)
HEMOGLOBIN: 9.6 G/DL (ref 11.5–15.5)
IMMATURE GRANULOCYTES #: 0.09 E9/L
IMMATURE GRANULOCYTES %: 0.8 % (ref 0–5)
INR BLD: 1.6
IRON SATURATION: 8 % (ref 15–50)
IRON: 27 MCG/DL (ref 37–145)
LYMPHOCYTES ABSOLUTE: 1.08 E9/L (ref 1.5–4)
LYMPHOCYTES RELATIVE PERCENT: 9.1 % (ref 20–42)
MCH RBC QN AUTO: 24.2 PG (ref 26–35)
MCHC RBC AUTO-ENTMCNC: 29.5 % (ref 32–34.5)
MCV RBC AUTO: 81.9 FL (ref 80–99.9)
METER GLUCOSE: 110 MG/DL (ref 74–99)
METER GLUCOSE: 130 MG/DL (ref 74–99)
METER GLUCOSE: 139 MG/DL (ref 74–99)
METER GLUCOSE: 147 MG/DL (ref 74–99)
MONOCYTES ABSOLUTE: 0.41 E9/L (ref 0.1–0.95)
MONOCYTES RELATIVE PERCENT: 3.5 % (ref 2–12)
NEUTROPHILS ABSOLUTE: 10.24 E9/L (ref 1.8–7.3)
NEUTROPHILS RELATIVE PERCENT: 86.5 % (ref 43–80)
PDW BLD-RTO: 18.7 FL (ref 11.5–15)
PLATELET # BLD: 676 E9/L (ref 130–450)
PMV BLD AUTO: 10.1 FL (ref 7–12)
PROTHROMBIN TIME: 19.7 SEC (ref 9.3–12.4)
RBC # BLD: 3.97 E12/L (ref 3.5–5.5)
TOTAL IRON BINDING CAPACITY: 342 MCG/DL (ref 250–450)
VITAMIN B-12: 279 PG/ML (ref 211–946)
WBC # BLD: 11.8 E9/L (ref 4.5–11.5)

## 2021-01-31 PROCEDURE — 82746 ASSAY OF FOLIC ACID SERUM: CPT

## 2021-01-31 PROCEDURE — 6360000002 HC RX W HCPCS: Performed by: NURSE PRACTITIONER

## 2021-01-31 PROCEDURE — 6370000000 HC RX 637 (ALT 250 FOR IP): Performed by: FAMILY MEDICINE

## 2021-01-31 PROCEDURE — 94660 CPAP INITIATION&MGMT: CPT

## 2021-01-31 PROCEDURE — 2060000000 HC ICU INTERMEDIATE R&B

## 2021-01-31 PROCEDURE — 36415 COLL VENOUS BLD VENIPUNCTURE: CPT

## 2021-01-31 PROCEDURE — 6370000000 HC RX 637 (ALT 250 FOR IP): Performed by: INTERNAL MEDICINE

## 2021-01-31 PROCEDURE — 2580000003 HC RX 258: Performed by: INTERNAL MEDICINE

## 2021-01-31 PROCEDURE — 82962 GLUCOSE BLOOD TEST: CPT

## 2021-01-31 PROCEDURE — APPSS30 APP SPLIT SHARED TIME 16-30 MINUTES: Performed by: NURSE PRACTITIONER

## 2021-01-31 PROCEDURE — 2580000003 HC RX 258: Performed by: NURSE PRACTITIONER

## 2021-01-31 PROCEDURE — 99233 SBSQ HOSP IP/OBS HIGH 50: CPT | Performed by: FAMILY MEDICINE

## 2021-01-31 PROCEDURE — 94640 AIRWAY INHALATION TREATMENT: CPT

## 2021-01-31 PROCEDURE — 6370000000 HC RX 637 (ALT 250 FOR IP): Performed by: NURSE PRACTITIONER

## 2021-01-31 PROCEDURE — 85025 COMPLETE CBC W/AUTO DIFF WBC: CPT

## 2021-01-31 PROCEDURE — 83550 IRON BINDING TEST: CPT

## 2021-01-31 PROCEDURE — 85610 PROTHROMBIN TIME: CPT

## 2021-01-31 PROCEDURE — C9113 INJ PANTOPRAZOLE SODIUM, VIA: HCPCS | Performed by: NURSE PRACTITIONER

## 2021-01-31 PROCEDURE — 82607 VITAMIN B-12: CPT

## 2021-01-31 PROCEDURE — 2700000000 HC OXYGEN THERAPY PER DAY

## 2021-01-31 PROCEDURE — 6360000002 HC RX W HCPCS: Performed by: INTERNAL MEDICINE

## 2021-01-31 PROCEDURE — 83540 ASSAY OF IRON: CPT

## 2021-01-31 RX ORDER — PREDNISONE 20 MG/1
40 TABLET ORAL DAILY
Status: DISCONTINUED | OUTPATIENT
Start: 2021-02-01 | End: 2021-02-01 | Stop reason: HOSPADM

## 2021-01-31 RX ORDER — WARFARIN SODIUM 10 MG/1
10 TABLET ORAL
Status: COMPLETED | OUTPATIENT
Start: 2021-01-31 | End: 2021-01-31

## 2021-01-31 RX ORDER — AMLODIPINE BESYLATE 2.5 MG/1
2.5 TABLET ORAL DAILY
Status: DISCONTINUED | OUTPATIENT
Start: 2021-01-31 | End: 2021-02-01 | Stop reason: HOSPADM

## 2021-01-31 RX ADMIN — IPRATROPIUM BROMIDE AND ALBUTEROL SULFATE 1 AMPULE: 2.5; .5 SOLUTION RESPIRATORY (INHALATION) at 16:05

## 2021-01-31 RX ADMIN — ARFORMOTEROL TARTRATE 15 MCG: 15 SOLUTION RESPIRATORY (INHALATION) at 21:09

## 2021-01-31 RX ADMIN — SODIUM CHLORIDE, PRESERVATIVE FREE 10 ML: 5 INJECTION INTRAVENOUS at 08:18

## 2021-01-31 RX ADMIN — METHYLPREDNISOLONE SODIUM SUCCINATE 40 MG: 40 INJECTION, POWDER, LYOPHILIZED, FOR SOLUTION INTRAMUSCULAR; INTRAVENOUS at 08:17

## 2021-01-31 RX ADMIN — INSULIN LISPRO 1 UNITS: 100 INJECTION, SOLUTION INTRAVENOUS; SUBCUTANEOUS at 16:11

## 2021-01-31 RX ADMIN — SODIUM CHLORIDE, PRESERVATIVE FREE 10 ML: 5 INJECTION INTRAVENOUS at 21:03

## 2021-01-31 RX ADMIN — ARFORMOTEROL TARTRATE 15 MCG: 15 SOLUTION RESPIRATORY (INHALATION) at 09:50

## 2021-01-31 RX ADMIN — SODIUM CHLORIDE, PRESERVATIVE FREE 10 ML: 5 INJECTION INTRAVENOUS at 08:17

## 2021-01-31 RX ADMIN — PANTOPRAZOLE SODIUM 40 MG: 40 INJECTION, POWDER, FOR SOLUTION INTRAVENOUS at 21:03

## 2021-01-31 RX ADMIN — LISINOPRIL 20 MG: 20 TABLET ORAL at 08:17

## 2021-01-31 RX ADMIN — WARFARIN SODIUM 10 MG: 10 TABLET ORAL at 18:34

## 2021-01-31 RX ADMIN — IPRATROPIUM BROMIDE AND ALBUTEROL SULFATE 1 AMPULE: 2.5; .5 SOLUTION RESPIRATORY (INHALATION) at 13:32

## 2021-01-31 RX ADMIN — BUDESONIDE 250 MCG: 0.25 SUSPENSION RESPIRATORY (INHALATION) at 09:50

## 2021-01-31 RX ADMIN — IPRATROPIUM BROMIDE AND ALBUTEROL SULFATE 1 AMPULE: 2.5; .5 SOLUTION RESPIRATORY (INHALATION) at 21:09

## 2021-01-31 RX ADMIN — AMLODIPINE BESYLATE 2.5 MG: 2.5 TABLET ORAL at 12:46

## 2021-01-31 RX ADMIN — BUDESONIDE 250 MCG: 0.25 SUSPENSION RESPIRATORY (INHALATION) at 21:09

## 2021-01-31 RX ADMIN — IPRATROPIUM BROMIDE AND ALBUTEROL SULFATE 1 AMPULE: 2.5; .5 SOLUTION RESPIRATORY (INHALATION) at 09:50

## 2021-01-31 RX ADMIN — PANTOPRAZOLE SODIUM 40 MG: 40 INJECTION, POWDER, FOR SOLUTION INTRAVENOUS at 08:17

## 2021-01-31 ASSESSMENT — PAIN SCALES - GENERAL
PAINLEVEL_OUTOF10: 0
PAINLEVEL_OUTOF10: 0

## 2021-01-31 NOTE — PROGRESS NOTES
Pulmonary Progress Note    Admit Date: 1/26/2021  Requesting Physician: Alexander Figueroa MD     SUBJECTIVE:   Feel much better today  Still with dyspnea  Wore Bipap last night, wears CPAP at home  Aware she needs to stop smoking        PMH:    Past Medical History:   Diagnosis Date    COPD [facility]chronic obstructive pulmonary disease) ([facility]     DVT (deep venous thrombosis) (HCC)     Hypertension     Pulmonary embolism (HCC)      PSH:    Past Surgical History:   Procedure Laterality Date    CARPAL TUNNEL RELEASE Right     CHOLECYSTECTOMY      HYSTERECTOMY      partial    MASTECTOMY, BILATERAL  1988          Respiratory ROS: dyspnea, cough. Otherwise, a complete review of systems is undertaken and is negative.     Social History:  Social History     Socioeconomic History    Marital status:      Spouse name: Not on file    Number of children: Not on file    Years of education: Not on file    Highest education level: Not on file   Occupational History    Not on file   Social Needs    Financial resource strain: Not on file    Food insecurity     Worry: Not on file     Inability: Not on file    Transportation needs     Medical: Not on file     Non-medical: Not on file   Tobacco Use    Smoking status: Current Every Day Smoker     Packs/day: 1.00    Smokeless tobacco: Never Used   Substance and Sexual Activity    Alcohol use: Not Currently    Drug use: Never    Sexual activity: Not on file   Lifestyle    Physical activity     Days per week: Not on file     Minutes per session: Not on file    Stress: Not on file   Relationships    Social connections     Talks on phone: Not on file     Gets together: Not on file     Attends Jain service: Not on file     Active member of club or organization: Not on file     Attends meetings of clubs or organizations: Not on file     Relationship status: Not on file    Intimate partner violence     Fear of current or ex partner: Not on file     Emotionally abused: Not on file     Physically abused: Not on file     Forced sexual activity: Not on file   Other Topics Concern    Not on file   Social History Narrative    Not on file   No ETOH  No rec drugs  Office work - retired    Family History:  History reviewed. No pertinent family history. Medications:     dextrose        warfarin  10 mg Oral Once    budesonide  250 mcg Nebulization BID    ipratropium-albuterol  1 ampule Inhalation 4x daily    pantoprazole  40 mg Intravenous BID    And    sodium chloride (PF)  10 mL Intravenous BID    insulin lispro  0-6 Units Subcutaneous TID WC    insulin lispro  0-3 Units Subcutaneous Nightly    sodium chloride flush  10 mL Intravenous 2 times per day    lisinopril  20 mg Oral Daily    methylPREDNISolone  40 mg Intravenous Q12H    Arformoterol Tartrate  15 mcg Nebulization BID    warfarin (COUMADIN) daily dosing (placeholder)   Other RX Placeholder         Vitals:    VITALS:  BP (!) 157/61   Pulse 80   Temp 97.3 °F (36.3 °C) (Temporal)   Resp 18   Ht 5' 5\" (1.651 m)   Wt 193 lb (87.5 kg)   SpO2 96%   BMI 32.12 kg/m²   24HR INTAKE/OUTPUT:      Intake/Output Summary (Last 24 hours) at 2021 1133  Last data filed at 2021 0508  Gross per 24 hour   Intake 360 ml   Output 700 ml   Net -340 ml     CURRENT PULSE OXIMETRY:  SpO2: 96 %  24HR PULSE OXIMETRY RANGE:  SpO2  Av.2 %  Min: 95 %  Max: 99 %      EXAM:  General: No distress. Eyes: PERRL. No sclera icterus. No conjunctival injection. ENT: No discharge. Pharynx clear. Neck: Trachea midline. Normal thyroid. Resp: No accessory muscle use. Diminished with bibasilar crackles and insp/exp bilateral wheezing. No rhonchi. CV: Regular rate. Regular rhythm. No mumur or rub. ABD: Non-tender. Non-distended. No masses. No organmegaly. Normal bowel sounds. Skin: Warm and dry. No nodule on exposed extremities. No rash on exposed extremities. Lymph: No cervical LAD. No supraclavicular LAD.    Ext: No joint deformity. No clubbing. No cyanosis. No edema  Neuro: Awake. Follows commands      Lab Results:  CBC:   Recent Labs     01/29/21  1200 01/31/21  0359   WBC 17.1* 11.8*   HGB 10.1* 9.6*   HCT 34.1 32.5*   MCV 83.4 81.9   * 676*     BMP:   Recent Labs     01/29/21  1200 01/30/21  0325    138   K 4.0 4.6    101   CO2 29 29   BUN 23 24*   CREATININE 0.8 0.8     LFT:   Recent Labs     01/29/21  1200 01/30/21  0325   ALKPHOS 104 91   ALT 25 23   AST 35* 19   PROT 7.9 6.9   BILITOT 0.3 0.2   LABALBU 3.9 3.6     PT/INR:   Recent Labs     01/29/21  1200 01/30/21  0325 01/31/21  0359   PROTIME 20.5* 20.2* 19.7*   INR 1.8 1.7 1.6     01/28/2021 Echo: Summary  Left ventricular internal dimensions were normal in diastole and systole. Indeterminate diastolic function. No regional wall motion abnormalities seen. Normal left ventricular ejection fraction. The left atrium is mildly dilated. Focal calcification mitral valve leaflets. Mild mitral annular calcification. Mild mitral regurgitation is present. Mild mitral stenosis . The  aortic valve appears mildly sclerotic. Films:  Xr Chest Portable 1/26/2021 FINDINGS: The cardiac silhouette is normal.  There is moderate thoracic aortic calcification. There is patchy opacity in the left base. Left costophrenic angle is obscured. Pulmonary vessels are normal in caliber. No pneumothorax. - Left basilar atelectasis versus asymmetrical edema or pneumonia. Small left pleural effusion. 01/30/21 CTA CHEST: Impression 1. No evidence of pulmonary embolism. 2. Minimal interstitial edema in the lung apices. Small bilateral pleural effusions. 3. Dilation of the main pulmonary artery raises concern for pulmonary arterial hypertension. 4. Edema along the inferior margin of the liver of unclear etiology. If indicated, dedicated CT of the abdomen and pelvis may be obtained for further evaluation.          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 I agree with above.     In addition, the following apply:    - Change/decrease steroid to po 40mg QD  - Continue Brovana, Pulmicort, duonebs  - await D/C planning     Otto Conrad  1/31/2021  3:13 PM

## 2021-01-31 NOTE — PROGRESS NOTES
[facility] Hospitalist   Progress Note    Admitting Date and Time: 1/26/2021  7:25 PM  Admit Dx: PNA (pneumonia) [J18.9]    Subjective:    Pt resting comfortably with eyes closed. Awakens easily to name. States she feels much better today. Breathing has improved. Has been up [facility]. Would like dietary number to order dinner. Per RN: Steroids to oral prednisone. ROS: denies fever, chills, cp,  n/v, HA unless stated above.      warfarin  10 mg Oral Once    budesonide  250 mcg Nebulization BID    ipratropium-albuterol  1 ampule Inhalation 4x daily    pantoprazole  40 mg Intravenous BID    And    sodium chloride (PF)  10 mL Intravenous BID    insulin lispro  0-6 Units Subcutaneous TID WC    insulin lispro  0-3 Units Subcutaneous Nightly    sodium chloride flush  10 mL Intravenous 2 times per day    lisinopril  20 mg Oral Daily    methylPREDNISolone  40 mg Intravenous Q12H    Arformoterol Tartrate  15 mcg Nebulization BID    warfarin (COUMADIN) daily dosing (placeholder)   Other RX Placeholder         guaiFENesin, 400 mg, 4x Daily PRN      glucose, 15 g, PRN      dextrose, 12.5 g, PRN      glucagon (rDNA), 1 mg, PRN      dextrose, 100 mL/hr, PRN      sodium chloride flush, 10 mL, PRN      potassium chloride, 40 mEq, PRN    Or      potassium alternative oral replacement, 40 mEq, PRN    Or      potassium chloride, 10 mEq, PRN      promethazine, 12.5 mg, Q6H PRN    Or      ondansetron, 4 mg, Q6H PRN      polyethylene glycol, 17 g, Daily PRN      acetaminophen, 650 mg, Q6H PRN    Or      acetaminophen, 650 mg, Q6H PRN      hydrALAZINE, 10 mg, Q6H PRN         Objective:    BP (!) 157/61   Pulse 80   Temp 97.3 °F (36.3 °C) (Temporal)   Resp 18   Ht 5' 5\" (1.651 m)   Wt 193 lb (87.5 kg)   SpO2 96%   BMI 32.12 kg/m²   General Appearance: alert and oriented to person, place and time and in no acute distress  Skin: warm and dry  Head: normocephalic and atraumatic  Eyes: pupils equal, round, and reactive to light, extraocular eye movements intact, conjunctivae normal  Neck: neck supple and non tender without mass   Pulmonary/Chest:  Diminished auscultation bilaterally- no wheeze,rhonchi, normal air movement, no respiratory distress  Cardiovascular: normal rate, normal S1 and S2 and no rubs, gallops, or murmur noted. Abdomen: soft, non-tender, non-distended, normal bowel sounds, no rebound, guarding, rigidity noted  Extremities: no cyanosis, no clubbing and no edema  Neurologic: no cranial nerve deficit and speech normal      Recent Labs     01/29/21  1200 01/30/21  0325    138   K 4.0 4.6    101   CO2 29 29   BUN 23 24*   CREATININE 0.8 0.8   GLUCOSE 134* 148*   CALCIUM 9.0 8.4*       Recent Labs     01/29/21  1200 01/30/21  0325   ALKPHOS 104 91   PROT 7.9 6.9   LABALBU 3.9 3.6   BILITOT 0.3 0.2   AST 35* 19   ALT 25 23       Recent Labs     01/29/21  1200 01/31/21  0359   WBC 17.1* 11.8*   RBC 4.09 3.97   HGB 10.1* 9.6*   HCT 34.1 32.5*   MCV 83.4 81.9   MCH 24.7* 24.2*   MCHC 29.6* 29.5*   RDW 19.3* 18.7*   * 676*   MPV 9.8 10.1      Ref. Range 1/26/2021 21:15 1/27/2021 09:42   INR Unknown 4.0 3.4      Ref. Range 1/27/2021 04:15   Procalcitonin Latest Ref Range: 0.00 - 0.08 ng/mL 0.06         Radiology:   CT CHEST PULMONARY EMBOLISM W CONTRAST   Final Result   1. No evidence of pulmonary embolism. 2. Minimal interstitial edema in the lung apices. Small bilateral pleural   effusions. 3.  Dilation of the main pulmonary artery raises concern for pulmonary   arterial hypertension. 4. Edema along the inferior margin of the liver of unclear etiology. If   indicated, dedicated CT of the abdomen and pelvis may be obtained for further   evaluation. XR CHEST PORTABLE   Final Result   Left basilar atelectasis versus asymmetrical edema or pneumonia. Small left   pleural effusion.           Assessment:  Active Problems:    PNA (pneumonia)    Chronic respiratory failure with hypoxia (HCC)    COPD exacerbation (HCC)    Supratherapeutic INR  Resolved Problems:    * No resolved hospital problems. *      Plan:  1. Acute on Chronic respiratory failure with hypoxia- No wheeze this afternoon. 2/2 COPD exacerbating. COVID-10 negative. Procal negative. Currently on 2L NC which is baseline per pt. Maintain SPO2>92%. Continue IV steroids changed to prednisone/Brovana/Pulmicort/nebs. Encourage incentive spirometry. 1/28/2021 ECHO  EF 60%. Left atrium mildly dilated. Focal Calcification mitral valve leaflets. Mild mitral annular calcification. Mild mitral regurgitation. Mild mitral stenosis. Aortic valve mildly sclerotic. Respiratory panel negative. Pulmonology input appreciated. 2. ?Pneumonia- 1/26/2021 CXR Left basilar atelectasis vs. Asymmetrical edema or Pneumonia. Small left pleural effusion. received Doxycycline/Ceftriaxone in ED course. Procalcitonin 0.06. Continue off Abx. 3. COPD Exacerbation- Continue Solumedrol/Nebs/brovana. 4. Supratherapeutic INR- Home regimen Warfarin on hold. INR 4.0>3.4>1.8  Continue Daily INR. Monitor closely for bleeding. Pharmacy consult for dosing. .     5. Leukocytosis- Reactive/steroids/infectious? WBC 17.6>11.9>17.1>11.8  UA negative Respiratory Panel negative. Fungus culture pending. . Afebrile. Procal/Blood cultures negative. Monitor off abx.     6. 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 INR 1.7. Will resume with INR<3. Pharmacy consulted for dosing of coumadin. 7. Recent Cholecystectomy- Approximately 2 weeks prior to admission. Performed at [address] [place] [address]- PT/OT for evaluation. 9. Anemia-Hgb/Hct  9.6/32.5. Iron panel/B12/Folate. No overt signs of bleeding noted. Follow transfuse as needed. 10. HTN- not well controlled. Continue Lisinopril add Amlodipine. Follow adjust as needed. 11.   Disposition- Likely DC in am. Will require assistance with transportation home. Currently at baseline oxygen supplementation. Refusing home Health care. Case Management/Social work following. NOTE: This report was transcribed using voice recognition software. Every effort was made to ensure accuracy; however, inadvertent computerized transcription errors may be present. Electronically signed by Sharad Blanco CNP on 1/31/2021 at 11:10 AM

## 2021-01-31 NOTE — PROGRESS NOTES
Pharmacy Consultation Note  (Anticoagulant Dosing and Monitoring)    Initial consult date: 1/27  Consulting physician: Nakul Nuñez    Allergies:  Carafate [sucralfate]    [de-identified] y.o. female      Ht Readings from Last 1 Encounters:   01/26/21 5' 5\" (1.651 m)     Wt Readings from Last 1 Encounters:   01/31/21 193 lb (87.5 kg)         Warfarin Indication Target   INR Range Home Dose  (if applicable) Diet/Feeding Tube   History of DVT/PE 2-3 5 mg on Tuesday and Thursday; 10 mg on all other days  (per patient interview)      Vitamin K or Blood product  Administration Date               Warfarin drug-drug interactions  Start  Stop Home Med?  Comments    Methylprednisolone 1/26  no May enhance anticoagulatory effect of warfarin                         Hepatic Function Panel:                            Lab Results   Component Value Date    ALKPHOS 91 01/30/2021    ALT 23 01/30/2021    AST 19 01/30/2021    PROT 6.9 01/30/2021    BILITOT 0.2 01/30/2021    LABALBU 3.6 01/30/2021       Date Warfarin Dose INR Heparin or LMWH HBG/HCT PLT Comment   1/27 No dose 3.4 X 9.5/31.2 675    1/28 5 mg 2.2 X X X    1/29 5 mg 1.8 X 10.1/34.1 782    1/30 10 mg 1.7 X X X    1/31 10  mg 1.6 X 9.6/32.5 676                        Assessment:  · Patient is a [de-identified]year old female on warfarin for history of DVT/PE  · Per patient, she takes warfarin 5 mg on Tuesday and Thursday and warfarin 10 mg all other days of the week  · INR upon admission on 1/26 was 4  · INR goal = 2-3; INR today = 1.6 (subtherapeutic, decreased from 1.7 yesterday)    Plan:  · Will give warfarin 10 mg x 1 dose again today  · Daily PT/INR until the INR is stable within the therapeutic range  · Pharmacist will follow and monitor/adjust dosing as necessary    Arnetha Sandifer, PharmD  1/31/2021  9:53 AM

## 2021-01-31 NOTE — PLAN OF CARE
Problem: Falls - Risk of:  Goal: Will remain free from falls  Description: Will remain free from falls  Outcome: Met This Shift  Goal: Absence of physical injury  Description: Absence of physical injury  Outcome: Met This Shift     Problem: Musculor/Skeletal Functional Status  Goal: Absence of falls  Outcome: Met This Shift

## 2021-01-31 NOTE — PROGRESS NOTES
Date: 1/30/2021    Time: 11:41 PM    Patient Placed On BIPAP/CPAP/ Non-Invasive Ventilation? Yes    If no must comment. Facial area red/color change? No           If YES are Blister/Lesion present? No   If yes must notify nursing staff  BIPAP/CPAP skin barrier?   Yes    Skin barrier type:mepilexlite       Comments:        Pinky Hue

## 2021-02-01 VITALS
DIASTOLIC BLOOD PRESSURE: 61 MMHG | OXYGEN SATURATION: 95 % | TEMPERATURE: 97 F | HEART RATE: 98 BPM | HEIGHT: 65 IN | RESPIRATION RATE: 20 BRPM | SYSTOLIC BLOOD PRESSURE: 134 MMHG | WEIGHT: 184.5 LBS | BODY MASS INDEX: 30.74 KG/M2

## 2021-02-01 LAB
ANION GAP SERPL CALCULATED.3IONS-SCNC: 7 MMOL/L (ref 7–16)
BLOOD CULTURE, ROUTINE: NORMAL
BUN BLDV-MCNC: 27 MG/DL (ref 8–23)
CALCIUM SERPL-MCNC: 8.5 MG/DL (ref 8.6–10.2)
CHLORIDE BLD-SCNC: 102 MMOL/L (ref 98–107)
CO2: 32 MMOL/L (ref 22–29)
CREAT SERPL-MCNC: 0.9 MG/DL (ref 0.5–1)
CULTURE, BLOOD 2: NORMAL
GFR AFRICAN AMERICAN: >60
GFR NON-AFRICAN AMERICAN: >60 ML/MIN/1.73
GLUCOSE BLD-MCNC: 142 MG/DL (ref 74–99)
HCT VFR BLD CALC: 35.5 % (ref 34–48)
HEMOGLOBIN: 10.5 G/DL (ref 11.5–15.5)
INR BLD: 2.6
MCH RBC QN AUTO: 24.4 PG (ref 26–35)
MCHC RBC AUTO-ENTMCNC: 29.6 % (ref 32–34.5)
MCV RBC AUTO: 82.4 FL (ref 80–99.9)
METER GLUCOSE: 153 MG/DL (ref 74–99)
METER GLUCOSE: 86 MG/DL (ref 74–99)
PDW BLD-RTO: 18.7 FL (ref 11.5–15)
PLATELET # BLD: 694 E9/L (ref 130–450)
PMV BLD AUTO: 10 FL (ref 7–12)
POTASSIUM SERPL-SCNC: 4.3 MMOL/L (ref 3.5–5)
PROTHROMBIN TIME: 30.9 SEC (ref 9.3–12.4)
RBC # BLD: 4.31 E12/L (ref 3.5–5.5)
SODIUM BLD-SCNC: 141 MMOL/L (ref 132–146)
WBC # BLD: 17.7 E9/L (ref 4.5–11.5)

## 2021-02-01 PROCEDURE — C9113 INJ PANTOPRAZOLE SODIUM, VIA: HCPCS | Performed by: NURSE PRACTITIONER

## 2021-02-01 PROCEDURE — 6370000000 HC RX 637 (ALT 250 FOR IP): Performed by: FAMILY MEDICINE

## 2021-02-01 PROCEDURE — 85027 COMPLETE CBC AUTOMATED: CPT

## 2021-02-01 PROCEDURE — 6370000000 HC RX 637 (ALT 250 FOR IP): Performed by: NURSE PRACTITIONER

## 2021-02-01 PROCEDURE — 94660 CPAP INITIATION&MGMT: CPT

## 2021-02-01 PROCEDURE — 6360000002 HC RX W HCPCS: Performed by: NURSE PRACTITIONER

## 2021-02-01 PROCEDURE — 6370000000 HC RX 637 (ALT 250 FOR IP): Performed by: INTERNAL MEDICINE

## 2021-02-01 PROCEDURE — 97110 THERAPEUTIC EXERCISES: CPT

## 2021-02-01 PROCEDURE — APPSS45 APP SPLIT SHARED TIME 31-45 MINUTES: Performed by: NURSE PRACTITIONER

## 2021-02-01 PROCEDURE — 6360000002 HC RX W HCPCS: Performed by: INTERNAL MEDICINE

## 2021-02-01 PROCEDURE — 82962 GLUCOSE BLOOD TEST: CPT

## 2021-02-01 PROCEDURE — 94640 AIRWAY INHALATION TREATMENT: CPT

## 2021-02-01 PROCEDURE — 99239 HOSP IP/OBS DSCHRG MGMT >30: CPT | Performed by: STUDENT IN AN ORGANIZED HEALTH CARE EDUCATION/TRAINING PROGRAM

## 2021-02-01 PROCEDURE — 2700000000 HC OXYGEN THERAPY PER DAY

## 2021-02-01 PROCEDURE — 36415 COLL VENOUS BLD VENIPUNCTURE: CPT

## 2021-02-01 PROCEDURE — 80048 BASIC METABOLIC PNL TOTAL CA: CPT

## 2021-02-01 PROCEDURE — 2580000003 HC RX 258: Performed by: INTERNAL MEDICINE

## 2021-02-01 PROCEDURE — 85610 PROTHROMBIN TIME: CPT

## 2021-02-01 PROCEDURE — 2580000003 HC RX 258: Performed by: NURSE PRACTITIONER

## 2021-02-01 RX ORDER — PREDNISONE 20 MG/1
40 TABLET ORAL DAILY
Qty: 20 TABLET | Refills: 0 | Status: SHIPPED | OUTPATIENT
Start: 2021-02-02 | End: 2021-02-12

## 2021-02-01 RX ORDER — IPRATROPIUM BROMIDE AND ALBUTEROL SULFATE 2.5; .5 MG/3ML; MG/3ML
3 SOLUTION RESPIRATORY (INHALATION) EVERY 6 HOURS PRN
Qty: 360 ML | Refills: 0 | Status: SHIPPED | OUTPATIENT
Start: 2021-02-01

## 2021-02-01 RX ORDER — WARFARIN SODIUM 5 MG/1
5 TABLET ORAL DAILY
Qty: 30 TABLET | Refills: 3 | Status: SHIPPED | OUTPATIENT
Start: 2021-02-01 | End: 2021-04-20

## 2021-02-01 RX ORDER — GUAIFENESIN 400 MG/1
400 TABLET ORAL 4 TIMES DAILY PRN
Qty: 56 TABLET | Refills: 0 | Status: SHIPPED | OUTPATIENT
Start: 2021-02-01

## 2021-02-01 RX ORDER — WARFARIN SODIUM 5 MG/1
5 TABLET ORAL
Status: DISCONTINUED | OUTPATIENT
Start: 2021-02-01 | End: 2021-02-01 | Stop reason: HOSPADM

## 2021-02-01 RX ORDER — AMLODIPINE BESYLATE 2.5 MG/1
2.5 TABLET ORAL DAILY
Qty: 30 TABLET | Refills: 3 | Status: SHIPPED | OUTPATIENT
Start: 2021-02-02

## 2021-02-01 RX ADMIN — SODIUM CHLORIDE, PRESERVATIVE FREE 10 ML: 5 INJECTION INTRAVENOUS at 09:01

## 2021-02-01 RX ADMIN — PREDNISONE 40 MG: 20 TABLET ORAL at 09:01

## 2021-02-01 RX ADMIN — PANTOPRAZOLE SODIUM 40 MG: 40 INJECTION, POWDER, FOR SOLUTION INTRAVENOUS at 09:01

## 2021-02-01 RX ADMIN — INSULIN LISPRO 1 UNITS: 100 INJECTION, SOLUTION INTRAVENOUS; SUBCUTANEOUS at 12:10

## 2021-02-01 RX ADMIN — AMLODIPINE BESYLATE 2.5 MG: 2.5 TABLET ORAL at 09:01

## 2021-02-01 RX ADMIN — IPRATROPIUM BROMIDE AND ALBUTEROL SULFATE 1 AMPULE: 2.5; .5 SOLUTION RESPIRATORY (INHALATION) at 12:09

## 2021-02-01 RX ADMIN — ARFORMOTEROL TARTRATE 15 MCG: 15 SOLUTION RESPIRATORY (INHALATION) at 07:45

## 2021-02-01 RX ADMIN — IPRATROPIUM BROMIDE AND ALBUTEROL SULFATE 1 AMPULE: 2.5; .5 SOLUTION RESPIRATORY (INHALATION) at 16:04

## 2021-02-01 RX ADMIN — BUDESONIDE 250 MCG: 0.25 SUSPENSION RESPIRATORY (INHALATION) at 07:46

## 2021-02-01 RX ADMIN — LISINOPRIL 20 MG: 20 TABLET ORAL at 09:01

## 2021-02-01 RX ADMIN — IPRATROPIUM BROMIDE AND ALBUTEROL SULFATE 1 AMPULE: 2.5; .5 SOLUTION RESPIRATORY (INHALATION) at 07:45

## 2021-02-01 NOTE — PROGRESS NOTES
Occupational Therapy  OT BEDSIDE TREATMENT NOTE      Date:2021  Patient Name: Lilian lFowers  MRN: [id number]  : 1940  Room: [address] [place] [address]      Referring Provider: Leslee Hodge         Evaluating OT: Lou Kaufman OTR/L [id number]     AM-PAC Daily Activity Raw Score:      Recommended Adaptive Equipment:  TBD      Diagnosis: Pneumonia     Pertinent Medical History: COPD, pulmonary embolism   Precautions:  Falls, impaired vision, alarm, O2     Home Living: Pt lives with daughter and family. 1 story with 2 + 1 steps/no rail    Bathroom setup: tub/shower with seat    Equipment owned: hurri cane, home O2     Prior Level of Function: Independent  with ADLs , assist with  with IADLs; ambulated with hurri cane   Driving: family transport      Pain Level: no c/o pain   Cognition: alert, oriented                 Functional Assessment:    Initial Eval Status  Date:  21 Treatment Status  Date:21 STGs = LTGs  Time frame: 7-10 days   Feeding Independent        Grooming SBA/set-up    Mod I    UB Dressing SBA/set-up    Mod I    LB Dressing Min A  Assist threading brief over feet    Mod I    Bathing Min A   Supervision    Toileting Supervision  Pt declined toileting at this time  Independent    Bed Mobility  SBA  Supine to sit   Supine <> sit: SBA     Functional Transfers SBA  Sit-stand from bed   STS: SBA from EOB Mod i   Functional Mobility SBA,w/walker   Ambulating in room   Mod I with good tolerance    Balance Sitting:     Static:  Independent     Dynamic:SBA   Standing: SBA  Sitting: Independent  Standing: SBA  Independent   with good tolerance    Activity Tolerance Fair with light activity  Fair  Good with ADL activity    Visual/  Perceptual Patient reports she has glasses but they don't really help                          B UE were WFL during tasks. Comments: Upon arrival pt was supine in bed.  At end of session pt was transferred back to bed per request with alarm on, all lines and tubes intact and call light within reach. Treatment: Instructed pt on 1x10 reps of B UE AROM exercises in all planes requiring mod vc and demoed for correct form. Exercises were performed to improve strength during ADLs. Pt participated in functional dynamic standing balance tasks at  to facilitate weight shifting and simulate ADLs. Pt reports family assists with ADLs as needed due to visual deficits. Education: Safe transfer training and benefits of OOB activity to increase tolerance to functional tasks    · Pt has made good progress towards set goals. Plan of Care: 1-3 days/week for 1-2 weeks PRN   [x]? ?ADL retraining/adapted techniques and AE recommendations to increase functional independence within precautions                    [x]? ? Energy conservation techniques to improve tolerance for selfcare routine   [x]? ? Functional transfer/mobility training/DME recommendations for increased independence, safety and fall prevention         [x]? ?Patient/family education to increase safety and functional independence             [x]? ? Environmental modifications for safe mobility and completion of ADLs                             []? ? Cognitive retraining ex's to improve problem solving skills & safe participation in ADLs/IADLs     []? ?Sensory re-education techniques to improve extremity awareness, maintain skin integrity and improve hand function                             []? ? Visual/Perceptual retraining  to improve body awareness and safety during transfers and ADLs  []? ? Splinting/positioning needs to maintain joint/skin integrity and prevent contractures  [x]? ? Therapeutic activity to improve functional performance during ADLs.                                         [x]? ? Therapeutic exercise to improve tolerance and functional strength for ADLs   [x]? ? Balance retraining/tolerance tasks for facilitation of postural control with dynamic challenges during ADLs .  []?? Neuromuscular re-education: facilitation of

## 2021-02-01 NOTE — CARE COORDINATION
2/1/2021  Social Work Discharge [address] 18/24. SW met with Pt. Confirmed she doesnt want HHC. Pt is on 2l o2 here and uses this at home via [facility] D[facility]. Home with daughter.  Electronically signed by AGUSTÍN Minaya on 2/1/2021 at 9:42 AM

## 2021-02-01 NOTE — PROGRESS NOTES
Date: 1/31/2021    Time: 10:44 PM    Patient Placed On BIPAP/CPAP/ Non-Invasive Ventilation? Yes    If no must comment. Facial area red/color change? No           If YES are Blister/Lesion present? No   If yes must notify nursing staff  BIPAP/CPAP skin barrier?   Yes    Skin barrier type:mepilexlite       Comments:        Sonu Grant

## 2021-02-01 NOTE — PROGRESS NOTES
Instructed pt on the proper use of an MDI with a spacer. Stated she cannot see but she understood what I was explaining. Informed pt that there are images and directions on the spacer packaging after I explained and demonstrated how to use the spacer and she said that was a good thing so her daughter can help her while at home. Questions were answered at this time.

## 2021-02-01 NOTE — DISCHARGE SUMMARY
[facility] [place] Hospitalist       Hospitalist Physician Discharge Summary       Patricio Mike MD  [address] [facility] [phone]    Schedule an appointment as soon as possible for a visit in 1 week  Please call for follow up post hospital stay appt. Activity level: As tolerated    Diet: DIET GENERAL;    Labs: INR    Condition at discharge: Stable    Dispo:Home    Continue supplemental oxygen via nasal canula @ 2 LPM round-the-clock. Continue CPAP / BiPAP during sleep as prior to admission. Patient ID:  Gregorio Barnes  [id number]  36 y.o.  1940    Admit date: 1/26/2021    Discharge date and time:  2/1/2021  12:54 PM    Admission Diagnoses: Active Problems:    PNA (pneumonia)    Chronic respiratory failure with hypoxia (HCC)    COPD exacerbation (HCC)    Supratherapeutic INR  Resolved Problems:    * No resolved hospital problems. *      Discharge Diagnoses: Active Problems:    PNA (pneumonia)    Chronic respiratory failure with hypoxia (HCC)    COPD exacerbation (HCC)    Supratherapeutic INR  Resolved Problems:    * No resolved hospital problems. *      Consults:  IP CONSULT TO PHARMACY  IP CONSULT TO IV TEAM  IP CONSULT TO PHARMACY  IP CONSULT TO PULMONOLOGY    Procedures:     1/28/2021 ECHO-- Dr. Min Valero  Left ventricular internal dimensions were normal in diastole and systole. No regional wall motion abnormalities seen. Normal left ventricular ejection fraction. The left atrium is mildly dilated. Focal calcification mitral valve leaflets. Mild mitral annular calcification. Mild mitral regurgitation is present. Mild mitral stenosis . The aortic valve appears mildly sclerotic. Hospital Course:     327/2021 [de-identified]year old female with PMH COPD, DVT, and PE presented to [address] [place] [address] ED with complaints of intermittent dyspnea beginning 5 days prior to presentation. Pt noted with hypoxia. CXR with left basilar atelectasis vs. Asymmetric edema or pneumonia.  Pt also noted with supratherapeutic INR. Coumadin was held and dosed per Pharmacy. Pt given IV abx/Steroids in ED course. Procalcitonin/UA/COVID-19 negative. Noted leukocytosis. likely reactive. Did trend down. Pulmonology consulted and seen throughout stay. 1/28/2021 ECHO performed noted above. IV steroids where transitioned to oral. Pt noted with uncontrolled HTN. Added low dose amlodipine with improvement. Pt SOB improved. She remained hemodynamically stable and will be discharged at this time Pt will be instructed to complete oral steroids 10 day course. She will be DC on 5mg Coumadin daily with INR check Wednesday. Pt and daughter updated on INR and need to follow up with PCP for further dosing instruction. She will also have Duonebs per nebulizer for increased SOB. Concerns voiced by daughter regarding INH therapy and not sure if mom is doing her INH appropriately. Nursing to reach out to RT for education on INH therapy. 1. Acute on Chronic respiratory failure with hypoxia- Contniues with wheeze throughout. 2/2 COPD exacerbating. COVID-10 negative. Procal negative. Currently on 2L NC which is baseline per pt. Maintain SPO2>92%. Continue steroids/nebs. Encourage incentive spirometry. 1/28/2021 ECHO  EF 60%. Left atrium mildly dilated. Focal Calcification mitral valve leaflets. Mild mitral annular calcification. Mild mitral regurgitation. Mild mitral stenosis. Aortic valve mildly sclerotic. Respiratory panel pending. Pulmonology input appreciated.      2. ?Pneumonia- 1/26/2021 CXR Left basilar atelectasis vs. Asymmetrical edema or Pneumonia. Small left pleural effusion. received Doxycycline/Ceftriaxone in ED course. Procalcitonin 0.06. Continue off Abx.      3. COPD Exacerbation- Continue Solumedrol/Nebs/brovana.      4. Supratherapeutic INR- Home regimen Warfarin on hold. INR 4.0>3.4>1.8  Continue Daily INR. Monitor closely for bleeding. Pharmacy consult for dosing. .      5. Leukocytosis- Reactive/steroids/infectious?  WBC 17. 6>11.9>17.1 trending back up. UA/Culture. Respiratory Panel/Fungus culture pending. . Afebrile. Procal/Blood cultures negative. Monitor off abx.      6. 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 INR 1.7. Will resume with INR<3. Pharmacy consulted for dosing of coumadin.       7. Recent Cholecystectomy- Approximately 2 weeks prior to admission. Performed at [facility].      8. Deconditioning- PT/OT for evaluation. 18/24          Discharge Exam:  Vitals:    01/31/21 2345 02/01/21 0412 02/01/21 0628 02/01/21 0845   BP:    135/66   Pulse:    77   Resp: 28 18  20   Temp:    98.6 °F (37 °C)   TempSrc:    Oral   SpO2:    95%   Weight:   184 lb 8 oz (83.7 kg)    Height:         General Appearance: alert and oriented to person, place and time and in no acute distress  Skin: warm and dry  Head: normocephalic and atraumatic  Eyes: pupils equal, round, and reactive to light, extraocular eye movements intact, conjunctivae normal  Neck: neck supple and non tender without mass   Pulmonary/Chest: Diminished auscultation bilaterally- no wheeze, rhonchi, normal air movement, no respiratory distress  Cardiovascular: normal rate, normal S1 and S2 and no rubs, gallops, or murmur noted. Abdomen: soft, non-tender, non-distended, normal bowel sounds, no rebound, guarding, rigidity noted  Extremities: no cyanosis, no clubbing and no edema  Neurologic: no cranial nerve deficit and speech normal      I/O last 3 completed shifts: In: 360 [P.O.:360]  Out: 1300 [Urine:1300]  No intake/output data recorded. LABS:  Recent Labs     01/30/21  0325      K 4.6      CO2 29   BUN 24*   CREATININE 0.8   GLUCOSE 148*   CALCIUM 8.4*       Recent Labs     01/31/21  0359 02/01/21  1218   WBC 11.8* 17.7*   RBC 3.97 4.31   HGB 9.6* 10.5*   HCT 32.5* 35.5   MCV 81.9 82.4   MCH 24.2* 24.4*   MCHC 29.5* 29.6*   RDW 18.7* 18.7*   * 694*   MPV 10.1 10.0      Ref.  Range 1/30/2021 13:48   Color, UA Latest Ref Range: Straw/Yellow  Yellow   Clarity, UA Latest Ref Range: Clear  Clear   Glucose, UA Latest Ref Range: Negative mg/dL Negative   Bilirubin, Urine Latest Ref Range: Negative  Negative   Ketones, Urine Latest Ref Range: Negative mg/dL Negative   Specific Gravity, UA Latest Ref Range: 1.005 - 1.030  1.020   Blood, Urine Latest Ref Range: Negative  TRACE-LYSED   pH, UA Latest Ref Range: 5.0 - 9.0  6.0   Protein, UA Latest Ref Range: Negative mg/dL Negative   Urobilinogen, Urine Latest Ref Range: <2.0 E.U./dL 0.2   Nitrite, Urine Latest Ref Range: Negative  Negative   Leukocyte Esterase, Urine Latest Ref Range: Negative  Negative   WBC, UA Latest Ref Range: 0 - 5 /HPF 0-1   RBC, UA Latest Ref Range: 0 - 2 /HPF 0-1   Bacteria, UA Latest Ref Range: None Seen /HPF RARE (A)      Ref. Range 1/26/2021 22:49   Culture, Blood 2 Unknown 5 Days no growth        Ref.  Range 1/30/2021 12:33   Influenza A by PCR Latest Ref Range: Not Detected  Not Detected   Influenza B by PCR Latest Ref Range: Not Detected  Not Detected   Adenovirus by PCR Latest Ref Range: Not Detected  Not Detected   Coronavirus 229E by PCR Latest Ref Range: Not Detected  Not Detected   Coronavirus HKU1 by PCR Latest Ref Range: Not Detected  Not Detected   Coronavirus NL63 by PCR Latest Ref Range: Not Detected  Not Detected   Coronavirus OC43 by PCR Latest Ref Range: Not Detected  Not Detected   Human Metapneumovirus by PCR Latest Ref Range: Not Detected  Not Detected   Human Rhinovirus/Enterovirus by PCR Latest Ref Range: Not Detected  Not Detected   Parainfluenza Virus 1 by PCR Latest Ref Range: Not Detected  Not Detected   Parainfluenza Virus 2 by PCR Latest Ref Range: Not Detected  Not Detected   Parainfluenza Virus 3 by PCR Latest Ref Range: Not Detected  Not Detected   Parainfluenza Virus 4 by PCR Latest Ref Range: Not Detected  Not Detected   Respiratory Syncytial Virus by PCR Latest Ref Range: Not Detected  Not Detected   Bordetella parapertussis by PCR supplies - per requirements of ordered product, patient preference, or coverage allowances. CONTINUE these medications which have CHANGED    Details   warfarin (COUMADIN) 5 MG tablet Take 1 tablet by mouth daily Daily  Please follow up with PCP for further adjustments. Qty: 30 tablet, Refills: 3         CONTINUE these medications which have NOT CHANGED    Details   lisinopril (PRINIVIL;ZESTRIL) 20 MG tablet Take 20 mg by mouth daily      pantoprazole (PROTONIX) 40 MG tablet Take 40 mg by mouth 2 times daily      umeclidinium-vilanterol (ANORO ELLIPTA) 62.5-25 MCG/INH AEPB inhaler Inhale 1 puff into the lungs daily               Note that more than 30 minutes was spent in preparing discharge papers, discussing discharge with patient, medication review, etc.    NOTE: This report was transcribed using voice recognition software. Every effort was made to ensure accuracy; however, inadvertent computerized transcription errors may be present. Signed:  Electronically signed by Sharad Blanco CNP on 2/1/2021 at 12:54 PM

## 2021-02-01 NOTE — PROGRESS NOTES
Pulmonary Progress Note    Admit Date: 1/26/2021  Requesting Physician: Ike Balderas MD     SUBJECTIVE:     On CPAP, wears CPAP at home  Slept well  Denies dyspnea  Aware she needs to stop smoking        PMH:    Past Medical History:   Diagnosis Date    COPD [facility]chronic obstructive pulmonary disease) ([facility] [address]     DVT (deep venous thrombosis) (HCC)     Hypertension     Pulmonary embolism (HCC)      PSH:    Past Surgical History:   Procedure Laterality Date    CARPAL TUNNEL RELEASE Right     CHOLECYSTECTOMY      HYSTERECTOMY      partial    MASTECTOMY, BILATERAL  1988          Respiratory ROS: dyspnea, cough. Otherwise, a complete review of systems is undertaken and is negative.     Social History:  Social History     Socioeconomic History    Marital status:      Spouse name: Not on file    Number of children: Not on file    Years of education: Not on file    Highest education level: Not on file   Occupational History    Not on file   Social Needs    Financial resource strain: Not on file    Food insecurity     Worry: Not on file     Inability: Not on file   [employer] needs     Medical: Not on file     Non-medical: Not on file   Tobacco Use    Smoking status: Current Every Day Smoker     Packs/day: 1.00    Smokeless tobacco: Never Used   Substance and Sexual Activity    Alcohol use: Not Currently    Drug use: Never    Sexual activity: Not on file   Lifestyle    Physical activity     Days per week: Not on file     Minutes per session: Not on file    Stress: Not on file   Relationships    Social connections     Talks on phone: Not on file     Gets together: Not on file     Attends Orthodox service: Not on file     Active member of club or organization: Not on file     Attends meetings of clubs or organizations: Not on file     Relationship status: Not on file    Intimate partner violence     Fear of current or ex partner: Not on file     Emotionally abused: Not on file Awake. Follows commands      Lab Results:  CBC:   Recent Labs     01/29/21  1200 01/31/21  0359   WBC 17.1* 11.8*   HGB 10.1* 9.6*   HCT 34.1 32.5*   MCV 83.4 81.9   * 676*     BMP:   Recent Labs     01/29/21  1200 01/30/21  0325    138   K 4.0 4.6    101   CO2 29 29   BUN 23 24*   CREATININE 0.8 0.8     LFT:   Recent Labs     01/29/21  1200 01/30/21  0325   ALKPHOS 104 91   ALT 25 23   AST 35* 19   PROT 7.9 6.9   BILITOT 0.3 0.2   LABALBU 3.9 3.6     PT/INR:   Recent Labs     01/29/21  1200 01/30/21  0325 01/31/21  0359   PROTIME 20.5* 20.2* 19.7*   INR 1.8 1.7 1.6     01/28/2021 Echo: Summary  Left ventricular internal dimensions were normal in diastole and systole. Indeterminate diastolic function. No regional wall motion abnormalities seen. Normal left ventricular ejection fraction. The left atrium is mildly dilated. Focal calcification mitral valve leaflets. Mild mitral annular calcification. Mild mitral regurgitation is present. Mild mitral stenosis . The  aortic valve appears mildly sclerotic. Films:  Xr Chest Portable 1/26/2021 FINDINGS: The cardiac silhouette is normal.  There is moderate thoracic aortic calcification. There is patchy opacity in the left base. Left costophrenic angle is obscured. Pulmonary vessels are normal in caliber. No pneumothorax. - Left basilar atelectasis versus asymmetrical edema or pneumonia. Small left pleural effusion. 01/30/21 CTA CHEST: Impression 1. No evidence of pulmonary embolism. 2. Minimal interstitial edema in the lung apices. Small bilateral pleural effusions. 3. Dilation of the main pulmonary artery raises concern for pulmonary arterial hypertension. 4. Edema along the inferior margin of the liver of unclear etiology. If indicated, dedicated CT of the abdomen and pelvis may be obtained for further evaluation.          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 I agree with above.     In addition, the following apply:    - steroids with taper  - O2, IS  - await D/C planning     Lanice Cogan  2/1/2021  3:27 PM

## 2021-02-01 NOTE — PLAN OF CARE
Problem: Falls - Risk of:  Goal: Will remain free from falls  Description: Will remain free from falls  Outcome: Met This Shift  Goal: Absence of physical injury  Description: Absence of physical injury  Outcome: Met This Shift     Problem: Musculor/Skeletal Functional Status  Goal: Highest potential functional level  Outcome: Met This Shift  Goal: Absence of falls  Outcome: Met This Shift     Problem: Pain:  Description: Pain management should include both nonpharmacologic and pharmacologic interventions.   Goal: Pain level will decrease  Description: Pain level will decrease  Outcome: Met This Shift  Goal: Control of acute pain  Description: Control of acute pain  Outcome: Met This Shift  Goal: Control of chronic pain  Description: Control of chronic pain  Outcome: Met This Shift

## 2021-02-01 NOTE — PROGRESS NOTES
Pharmacy Consultation Note  (Anticoagulant Dosing and Monitoring)    Initial consult date: 1/27  Consulting physician: Verlie Ahumada    Allergies:  Carafate [sucralfate]    [de-identified] y.o. female      Ht Readings from Last 1 Encounters:   01/26/21 5' 5\" (1.651 m)     Wt Readings from Last 1 Encounters:   02/01/21 184 lb 8 oz (83.7 kg)         Warfarin Indication Target   INR Range Home Dose  (if applicable) Diet/Feeding Tube   History of DVT/PE 2-3 5 mg on Tuesday and Thursday; 10 mg on all other days  (per patient interview)      Vitamin K or Blood product  Administration Date               Warfarin drug-drug interactions  Start  Stop Home Med?  Comments    Methylprednisolone 1/26  no May enhance anticoagulatory effect of warfarin                         Hepatic Function Panel:                            Lab Results   Component Value Date    ALKPHOS 91 01/30/2021    ALT 23 01/30/2021    AST 19 01/30/2021    PROT 6.9 01/30/2021    BILITOT 0.2 01/30/2021    LABALBU 3.6 01/30/2021       Date Warfarin Dose INR Heparin or LMWH HBG/HCT PLT Comment   1/27 No dose 3.4 X 9.5/31.2 675    1/28 5 mg 2.2 X X X    1/29 5 mg 1.8 X 10.1/34.1 782    1/30 10 mg 1.7 X X X    1/31 10 mg 1.6 X 9.6/32.5 676    2/1 5 mg 2.6 X X X               Assessment:  · Patient is a [de-identified]year old female on warfarin for history of DVT/PE  · Per patient, she takes warfarin 5 mg on Tuesday and Thursday and warfarin 10 mg all other days of the week  · INR upon admission on 1/26 was 4  · INR goal = 2-3; INR today = 2.6 (therapeutic, increased from 1.6 yesterday)    Plan:  · Will give warfarin 5 mg x 1 dose today  · Daily PT/INR until the INR is stable within the therapeutic range  · Pharmacist will follow and monitor/adjust dosing as necessary    Augustin Macdonald, PharmD, BCCCP  2/1/2021  11:41 AM

## 2021-04-15 PROBLEM — G47.33 OSA (OBSTRUCTIVE SLEEP APNEA): Status: ACTIVE | Noted: 2021-04-15

## 2021-04-20 ENCOUNTER — APPOINTMENT (OUTPATIENT)
Dept: GENERAL RADIOLOGY | Age: 81
DRG: 192 | End: 2021-04-20
Payer: MEDICARE

## 2021-04-20 ENCOUNTER — APPOINTMENT (OUTPATIENT)
Dept: CT IMAGING | Age: 81
DRG: 192 | End: 2021-04-20
Payer: MEDICARE

## 2021-04-20 ENCOUNTER — HOSPITAL ENCOUNTER (INPATIENT)
Age: 81
LOS: 3 days | Discharge: OTHER INSTITUTION | DRG: 192 | End: 2021-04-23
Attending: EMERGENCY MEDICINE | Admitting: INTERNAL MEDICINE
Payer: MEDICARE

## 2021-04-20 DIAGNOSIS — D72.829 LEUKOCYTOSIS, UNSPECIFIED TYPE: ICD-10-CM

## 2021-04-20 DIAGNOSIS — J44.1 COPD EXACERBATION (HCC): Primary | ICD-10-CM

## 2021-04-20 DIAGNOSIS — J96.01 ACUTE RESPIRATORY FAILURE WITH HYPOXIA (HCC): ICD-10-CM

## 2021-04-20 PROBLEM — J96.21 ACUTE ON CHRONIC RESPIRATORY FAILURE WITH HYPOXIA (HCC): Status: ACTIVE | Noted: 2021-04-20

## 2021-04-20 PROBLEM — R79.1 SUBTHERAPEUTIC INTERNATIONAL NORMALIZED RATIO (INR): Status: ACTIVE | Noted: 2021-04-20

## 2021-04-20 PROBLEM — D64.9 CHRONIC ANEMIA: Status: ACTIVE | Noted: 2021-04-20

## 2021-04-20 PROBLEM — Z86.711 HISTORY OF PULMONARY EMBOLISM: Status: ACTIVE | Noted: 2021-04-20

## 2021-04-20 PROBLEM — Z86.718 HISTORY OF DVT (DEEP VEIN THROMBOSIS): Status: ACTIVE | Noted: 2021-04-20

## 2021-04-20 PROBLEM — I10 ESSENTIAL HYPERTENSION: Status: ACTIVE | Noted: 2021-04-20

## 2021-04-20 PROBLEM — Z72.0 TOBACCO ABUSE: Status: ACTIVE | Noted: 2021-04-20

## 2021-04-20 LAB
ALBUMIN SERPL-MCNC: 4.2 G/DL (ref 3.5–5.2)
ALP BLD-CCNC: 142 U/L (ref 35–104)
ALT SERPL-CCNC: 7 U/L (ref 0–32)
ANION GAP SERPL CALCULATED.3IONS-SCNC: 10 MMOL/L (ref 7–16)
ANISOCYTOSIS: ABNORMAL
APTT: 34 SEC (ref 24.5–35.1)
AST SERPL-CCNC: 12 U/L (ref 0–31)
BACTERIA: NORMAL /HPF
BASOPHILS ABSOLUTE: 0.06 E9/L (ref 0–0.2)
BASOPHILS RELATIVE PERCENT: 0.3 % (ref 0–2)
BILIRUB SERPL-MCNC: 0.3 MG/DL (ref 0–1.2)
BILIRUBIN URINE: NEGATIVE
BLOOD, URINE: NORMAL
BUN BLDV-MCNC: 18 MG/DL (ref 8–23)
CALCIUM SERPL-MCNC: 9.4 MG/DL (ref 8.6–10.2)
CHLORIDE BLD-SCNC: 101 MMOL/L (ref 98–107)
CLARITY: CLEAR
CO2: 28 MMOL/L (ref 22–29)
COLOR: YELLOW
CREAT SERPL-MCNC: 0.9 MG/DL (ref 0.5–1)
D DIMER: 241 NG/ML DDU
EOSINOPHILS ABSOLUTE: 0.08 E9/L (ref 0.05–0.5)
EOSINOPHILS RELATIVE PERCENT: 0.3 % (ref 0–6)
GFR AFRICAN AMERICAN: >60
GFR NON-AFRICAN AMERICAN: >60 ML/MIN/1.73
GLUCOSE BLD-MCNC: 122 MG/DL (ref 74–99)
GLUCOSE URINE: NEGATIVE MG/DL
HCT VFR BLD CALC: 38.1 % (ref 34–48)
HEMOGLOBIN: 11 G/DL (ref 11.5–15.5)
HYPOCHROMIA: ABNORMAL
IMMATURE GRANULOCYTES #: 0.06 E9/L
IMMATURE GRANULOCYTES %: 0.3 % (ref 0–5)
INR BLD: 1.7
KETONES, URINE: NEGATIVE MG/DL
LEUKOCYTE ESTERASE, URINE: NEGATIVE
LYMPHOCYTES ABSOLUTE: 1 E9/L (ref 1.5–4)
LYMPHOCYTES RELATIVE PERCENT: 4.2 % (ref 20–42)
MCH RBC QN AUTO: 22.1 PG (ref 26–35)
MCHC RBC AUTO-ENTMCNC: 28.9 % (ref 32–34.5)
MCV RBC AUTO: 76.7 FL (ref 80–99.9)
MONOCYTES ABSOLUTE: 1.13 E9/L (ref 0.1–0.95)
MONOCYTES RELATIVE PERCENT: 4.7 % (ref 2–12)
NEUTROPHILS ABSOLUTE: 21.48 E9/L (ref 1.8–7.3)
NEUTROPHILS RELATIVE PERCENT: 90.2 % (ref 43–80)
NITRITE, URINE: NEGATIVE
PDW BLD-RTO: 17.3 FL (ref 11.5–15)
PH UA: 5.5 (ref 5–9)
PLATELET # BLD: 576 E9/L (ref 130–450)
PMV BLD AUTO: 9.2 FL (ref 7–12)
POIKILOCYTES: ABNORMAL
POLYCHROMASIA: ABNORMAL
POTASSIUM SERPL-SCNC: 3.9 MMOL/L (ref 3.5–5)
PRO-BNP: 261 PG/ML (ref 0–450)
PROCALCITONIN: 0.12 NG/ML (ref 0–0.08)
PROTEIN UA: NEGATIVE MG/DL
PROTHROMBIN TIME: 18.7 SEC (ref 9.3–12.4)
RBC # BLD: 4.97 E12/L (ref 3.5–5.5)
RBC UA: NORMAL /HPF (ref 0–2)
SARS-COV-2, NAAT: NOT DETECTED
SCHISTOCYTES: ABNORMAL
SODIUM BLD-SCNC: 139 MMOL/L (ref 132–146)
SPECIFIC GRAVITY UA: 1.01 (ref 1–1.03)
TOTAL PROTEIN: 7.5 G/DL (ref 6.4–8.3)
TROPONIN: <0.01 NG/ML (ref 0–0.03)
UROBILINOGEN, URINE: 0.2 E.U./DL
WBC # BLD: 23.8 E9/L (ref 4.5–11.5)
WBC UA: NORMAL /HPF (ref 0–5)

## 2021-04-20 PROCEDURE — 84145 PROCALCITONIN (PCT): CPT

## 2021-04-20 PROCEDURE — 2580000003 HC RX 258: Performed by: NURSE PRACTITIONER

## 2021-04-20 PROCEDURE — 83880 ASSAY OF NATRIURETIC PEPTIDE: CPT

## 2021-04-20 PROCEDURE — 71275 CT ANGIOGRAPHY CHEST: CPT

## 2021-04-20 PROCEDURE — 99284 EMERGENCY DEPT VISIT MOD MDM: CPT

## 2021-04-20 PROCEDURE — 84484 ASSAY OF TROPONIN QUANT: CPT

## 2021-04-20 PROCEDURE — 2500000003 HC RX 250 WO HCPCS: Performed by: NURSE PRACTITIONER

## 2021-04-20 PROCEDURE — 80053 COMPREHEN METABOLIC PANEL: CPT

## 2021-04-20 PROCEDURE — 93005 ELECTROCARDIOGRAM TRACING: CPT | Performed by: EMERGENCY MEDICINE

## 2021-04-20 PROCEDURE — 87186 SC STD MICRODIL/AGAR DIL: CPT

## 2021-04-20 PROCEDURE — 6360000004 HC RX CONTRAST MEDICATION: Performed by: RADIOLOGY

## 2021-04-20 PROCEDURE — 87088 URINE BACTERIA CULTURE: CPT

## 2021-04-20 PROCEDURE — 6360000002 HC RX W HCPCS: Performed by: NURSE PRACTITIONER

## 2021-04-20 PROCEDURE — 2060000000 HC ICU INTERMEDIATE R&B

## 2021-04-20 PROCEDURE — 6370000000 HC RX 637 (ALT 250 FOR IP): Performed by: NURSE PRACTITIONER

## 2021-04-20 PROCEDURE — 85378 FIBRIN DEGRADE SEMIQUANT: CPT

## 2021-04-20 PROCEDURE — 87635 SARS-COV-2 COVID-19 AMP PRB: CPT

## 2021-04-20 PROCEDURE — 71045 X-RAY EXAM CHEST 1 VIEW: CPT

## 2021-04-20 PROCEDURE — 6370000000 HC RX 637 (ALT 250 FOR IP): Performed by: EMERGENCY MEDICINE

## 2021-04-20 PROCEDURE — 94640 AIRWAY INHALATION TREATMENT: CPT

## 2021-04-20 PROCEDURE — 85610 PROTHROMBIN TIME: CPT

## 2021-04-20 PROCEDURE — 85730 THROMBOPLASTIN TIME PARTIAL: CPT

## 2021-04-20 PROCEDURE — 85025 COMPLETE CBC W/AUTO DIFF WBC: CPT

## 2021-04-20 PROCEDURE — 94660 CPAP INITIATION&MGMT: CPT

## 2021-04-20 PROCEDURE — 81001 URINALYSIS AUTO W/SCOPE: CPT

## 2021-04-20 PROCEDURE — 87449 NOS EACH ORGANISM AG IA: CPT

## 2021-04-20 RX ORDER — SODIUM CHLORIDE 0.9 % (FLUSH) 0.9 %
5-40 SYRINGE (ML) INJECTION PRN
Status: DISCONTINUED | OUTPATIENT
Start: 2021-04-20 | End: 2021-04-23 | Stop reason: HOSPADM

## 2021-04-20 RX ORDER — ALBUTEROL SULFATE 90 UG/1
2 AEROSOL, METERED RESPIRATORY (INHALATION) EVERY 6 HOURS
Status: DISCONTINUED | OUTPATIENT
Start: 2021-04-20 | End: 2021-04-21

## 2021-04-20 RX ORDER — WARFARIN SODIUM 7.5 MG/1
7.5 TABLET ORAL
COMMUNITY

## 2021-04-20 RX ORDER — LISINOPRIL 20 MG/1
20 TABLET ORAL DAILY
Status: DISCONTINUED | OUTPATIENT
Start: 2021-04-21 | End: 2021-04-23 | Stop reason: HOSPADM

## 2021-04-20 RX ORDER — ACETAMINOPHEN 325 MG/1
650 TABLET ORAL EVERY 6 HOURS PRN
Status: DISCONTINUED | OUTPATIENT
Start: 2021-04-20 | End: 2021-04-23 | Stop reason: HOSPADM

## 2021-04-20 RX ORDER — SODIUM CHLORIDE 9 MG/ML
25 INJECTION, SOLUTION INTRAVENOUS PRN
Status: DISCONTINUED | OUTPATIENT
Start: 2021-04-20 | End: 2021-04-23 | Stop reason: HOSPADM

## 2021-04-20 RX ORDER — METHYLPREDNISOLONE SODIUM SUCCINATE 40 MG/ML
40 INJECTION, POWDER, LYOPHILIZED, FOR SOLUTION INTRAMUSCULAR; INTRAVENOUS EVERY 8 HOURS
Status: DISCONTINUED | OUTPATIENT
Start: 2021-04-20 | End: 2021-04-21

## 2021-04-20 RX ORDER — IPRATROPIUM BROMIDE AND ALBUTEROL SULFATE 2.5; .5 MG/3ML; MG/3ML
1 SOLUTION RESPIRATORY (INHALATION) ONCE
Status: COMPLETED | OUTPATIENT
Start: 2021-04-20 | End: 2021-04-20

## 2021-04-20 RX ORDER — ACETAMINOPHEN 325 MG/1
650 TABLET ORAL ONCE
Status: COMPLETED | OUTPATIENT
Start: 2021-04-20 | End: 2021-04-20

## 2021-04-20 RX ORDER — BUDESONIDE 0.5 MG/2ML
0.5 INHALANT ORAL 2 TIMES DAILY
Status: DISCONTINUED | OUTPATIENT
Start: 2021-04-20 | End: 2021-04-20 | Stop reason: ALTCHOICE

## 2021-04-20 RX ORDER — ACETAMINOPHEN 650 MG/1
650 SUPPOSITORY RECTAL EVERY 6 HOURS PRN
Status: DISCONTINUED | OUTPATIENT
Start: 2021-04-20 | End: 2021-04-23 | Stop reason: HOSPADM

## 2021-04-20 RX ORDER — WARFARIN SODIUM 5 MG/1
5 TABLET ORAL DAILY
Status: DISCONTINUED | OUTPATIENT
Start: 2021-04-20 | End: 2021-04-23 | Stop reason: HOSPADM

## 2021-04-20 RX ORDER — PANTOPRAZOLE SODIUM 40 MG/1
40 TABLET, DELAYED RELEASE ORAL 2 TIMES DAILY
Status: DISCONTINUED | OUTPATIENT
Start: 2021-04-20 | End: 2021-04-23 | Stop reason: HOSPADM

## 2021-04-20 RX ORDER — POLYETHYLENE GLYCOL 3350 17 G/17G
17 POWDER, FOR SOLUTION ORAL DAILY PRN
Status: DISCONTINUED | OUTPATIENT
Start: 2021-04-20 | End: 2021-04-23 | Stop reason: HOSPADM

## 2021-04-20 RX ORDER — SODIUM CHLORIDE 0.9 % (FLUSH) 0.9 %
5-40 SYRINGE (ML) INJECTION EVERY 12 HOURS SCHEDULED
Status: DISCONTINUED | OUTPATIENT
Start: 2021-04-20 | End: 2021-04-23 | Stop reason: HOSPADM

## 2021-04-20 RX ORDER — NITROFURANTOIN 25; 75 MG/1; MG/1
100 CAPSULE ORAL 2 TIMES DAILY
COMMUNITY
Start: 2021-04-19 | End: 2021-04-28

## 2021-04-20 RX ORDER — ARFORMOTEROL TARTRATE 15 UG/2ML
15 SOLUTION RESPIRATORY (INHALATION) 2 TIMES DAILY
Status: DISCONTINUED | OUTPATIENT
Start: 2021-04-20 | End: 2021-04-20 | Stop reason: ALTCHOICE

## 2021-04-20 RX ORDER — BUDESONIDE AND FORMOTEROL FUMARATE DIHYDRATE 160; 4.5 UG/1; UG/1
2 AEROSOL RESPIRATORY (INHALATION) 2 TIMES DAILY
Status: DISCONTINUED | OUTPATIENT
Start: 2021-04-20 | End: 2021-04-21

## 2021-04-20 RX ORDER — WARFARIN SODIUM 10 MG/1
10 TABLET ORAL
Status: ON HOLD | COMMUNITY
End: 2021-04-23 | Stop reason: SDUPTHER

## 2021-04-20 RX ORDER — AMLODIPINE BESYLATE 2.5 MG/1
2.5 TABLET ORAL DAILY
Status: DISCONTINUED | OUTPATIENT
Start: 2021-04-21 | End: 2021-04-23 | Stop reason: HOSPADM

## 2021-04-20 RX ADMIN — WARFARIN SODIUM 5 MG: 5 TABLET ORAL at 22:46

## 2021-04-20 RX ADMIN — IPRATROPIUM BROMIDE AND ALBUTEROL SULFATE 1 AMPULE: 2.5; .5 SOLUTION RESPIRATORY (INHALATION) at 17:48

## 2021-04-20 RX ADMIN — METHYLPREDNISOLONE SODIUM SUCCINATE 40 MG: 40 INJECTION, POWDER, LYOPHILIZED, FOR SOLUTION INTRAMUSCULAR; INTRAVENOUS at 21:45

## 2021-04-20 RX ADMIN — SODIUM CHLORIDE, PRESERVATIVE FREE 10 ML: 5 INJECTION INTRAVENOUS at 22:47

## 2021-04-20 RX ADMIN — DOXYCYCLINE 100 MG: 100 INJECTION, POWDER, LYOPHILIZED, FOR SOLUTION INTRAVENOUS at 22:47

## 2021-04-20 RX ADMIN — IOPAMIDOL 75 ML: 755 INJECTION, SOLUTION INTRAVENOUS at 22:25

## 2021-04-20 RX ADMIN — PANTOPRAZOLE SODIUM 40 MG: 40 TABLET, DELAYED RELEASE ORAL at 22:46

## 2021-04-20 RX ADMIN — ACETAMINOPHEN 650 MG: 325 TABLET ORAL at 19:09

## 2021-04-20 RX ADMIN — BUDESONIDE AND FORMOTEROL FUMARATE DIHYDRATE 2 PUFF: 160; 4.5 AEROSOL RESPIRATORY (INHALATION) at 23:17

## 2021-04-20 ASSESSMENT — PAIN SCALES - GENERAL: PAINLEVEL_OUTOF10: 0

## 2021-04-20 NOTE — PROGRESS NOTES
Pt daughter, [facility], called to check pt status. Advised has been seen, but disposition not knopwn @ this time. Requests call upon disposition @ [id number].

## 2021-04-20 NOTE — ED NOTES
Spoke to daughter and notified her that her mother will be admitted     Ambrocio Hebert RN  04/20/21 Mercedes Ortiz

## 2021-04-20 NOTE — ED PROVIDER NOTES
HPI:  4/20/21, Time: 5:24 PM EDT         Analia Gill is a [de-identified] y.o. female presenting to the ED for SOB low pulse ox 88% in triage NP cough, beginning several days ago. The complaint has been persistent, severe in severity, and worsened by light exertion. Patient known history of COPD emphysema was smoking today became worsening short of breath. States her symptoms are ongoing for several days. She did see her new pulmonologist on Friday of this week. Patient does take oral steroids and CPAP as needed at night. She also suffers from history of DVT and pulmonary embolism in the remote past.  She states she is on Coumadin. Does not know when her last INR was. She has no fevers or chills to get her Covid vaccinations. She has no abdominal pain nausea or vomiting. She denies hemoptysis. ROS:   Pertinent positives and negatives are stated within HPI, all other systems reviewed and are negative.  --------------------------------------------- PAST HISTORY ---------------------------------------------  Past Medical History:  has a past medical history of COPD (chronic obstructive pulmonary disease) ([facility] [address] [facility] Hypertension, and Pulmonary embolism ([facility] Past Surgical History:  has a past surgical history that includes Cholecystectomy; Mastectomy, bilateral (1988); Carpal tunnel release (Right); and Hysterectomy. Social History:  reports that she has been smoking cigarettes. She started smoking about 68 years ago. She has a 32.50 pack-year smoking history. She has never used smokeless tobacco. She reports previous alcohol use. She reports that she does not use drugs. Family History: family history includes Heart Attack in her mother; Other in her brother. The patients home medications have been reviewed.     Allergies: Carafate [sucralfate]    ---------------------------------------------------PHYSICAL EXAM--------------------------------------    Constitutional/General: Alert and oriented x3, well appearing, non toxic in NAD  Head: Normocephalic and atraumatic  Eyes: PERRL, EOMI  Mouth: Oropharynx clear, handling secretions, no trismus  Neck: Supple, full ROM, non tender to palpation in the midline, no stridor, no crepitus, no meningeal signs  Pulmonary: Lungs clear to auscultation bilaterally, no wheezes, rales, or rhonchi. Not in respiratory distress  Cardiovascular:  Regular rate. Regular rhythm. No murmurs, gallops, or rubs. 2+ distal pulses  Chest: no chest wall tenderness  Abdomen: Soft. Non tender. Non distended. +BS. No rebound, guarding, or rigidity. No pulsatile masses appreciated. Musculoskeletal: Moves all extremities x 4. Warm and well perfused, no clubbing, cyanosis, or edema. Capillary refill <3 seconds  Skin: warm and dry. No rashes. Neurologic: GCS 15, CN 2-12 grossly intact, no focal deficits, symmetric strength 5/5 in the upper and lower extremities bilaterally  Psych: Normal Affect    -------------------------------------------------- RESULTS -------------------------------------------------  I have personally reviewed all laboratory and imaging results for this patient. Results are listed below.      LABS:  Results for orders placed or performed during the hospital encounter of 04/20/21   COVID-19, Rapid   Result Value Ref Range    SARS-CoV-2, NAAT Not Detected Not Detected   CBC Auto Differential   Result Value Ref Range    WBC 23.8 (H) 4.5 - 11.5 E9/L    RBC 4.97 3.50 - 5.50 E12/L    Hemoglobin 11.0 (L) 11.5 - 15.5 g/dL    Hematocrit 38.1 34.0 - 48.0 %    MCV 76.7 (L) 80.0 - 99.9 fL    MCH 22.1 (L) 26.0 - 35.0 pg    MCHC 28.9 (L) 32.0 - 34.5 %    RDW 17.3 (H) 11.5 - 15.0 fL    Platelets 028 (H) 046 - 450 E9/L    MPV 9.2 7.0 - 12.0 fL    Neutrophils % 90.2 (H) 43.0 - 80.0 %    Immature Granulocytes % 0.3 0.0 - 5.0 %    Lymphocytes % 4.2 (L) 20.0 - 42.0 %    Monocytes % 4.7 2.0 - 12.0 % Eosinophils % 0.3 0.0 - 6.0 %    Basophils % 0.3 0.0 - 2.0 %    Neutrophils Absolute 21.48 (H) 1.80 - 7.30 E9/L    Immature Granulocytes # 0.06 E9/L    Lymphocytes Absolute 1.00 (L) 1.50 - 4.00 E9/L    Monocytes Absolute 1.13 (H) 0.10 - 0.95 E9/L    Eosinophils Absolute 0.08 0.05 - 0.50 E9/L    Basophils Absolute 0.06 0.00 - 0.20 E9/L    Anisocytosis 1+     Polychromasia 1+     Hypochromia 1+     Poikilocytes 1+     Schistocytes 1+    Troponin   Result Value Ref Range    Troponin <0.01 0.00 - 0.03 ng/mL   Brain Natriuretic Peptide   Result Value Ref Range    Pro- 0 - 450 pg/mL   D-Dimer, Quantitative   Result Value Ref Range    D-Dimer, Quant 241 ng/mL DDU   APTT   Result Value Ref Range    aPTT 34.0 24.5 - 35.1 sec   Protime-INR   Result Value Ref Range    Protime 18.7 (H) 9.3 - 12.4 sec    INR 1.7    Comprehensive Metabolic Panel   Result Value Ref Range    Sodium 139 132 - 146 mmol/L    Potassium 3.9 3.5 - 5.0 mmol/L    Chloride 101 98 - 107 mmol/L    CO2 28 22 - 29 mmol/L    Anion Gap 10 7 - 16 mmol/L    Glucose 122 (H) 74 - 99 mg/dL    BUN 18 8 - 23 mg/dL    CREATININE 0.9 0.5 - 1.0 mg/dL    GFR Non-African American >60 >=60 mL/min/1.73    GFR African American >60     Calcium 9.4 8.6 - 10.2 mg/dL    Total Protein 7.5 6.4 - 8.3 g/dL    Albumin 4.2 3.5 - 5.2 g/dL    Total Bilirubin 0.3 0.0 - 1.2 mg/dL    Alkaline Phosphatase 142 (H) 35 - 104 U/L    ALT 7 0 - 32 U/L    AST 12 0 - 31 U/L   Procalcitonin   Result Value Ref Range    Procalcitonin 0.12 (H) 0.00 - 0.08 ng/mL   URINALYSIS   Result Value Ref Range    Color, UA Yellow Straw/Yellow    Clarity, UA Clear Clear    Glucose, Ur Negative Negative mg/dL    Bilirubin Urine Negative Negative    Ketones, Urine Negative Negative mg/dL    Specific Gravity, UA 1.015 1.005 - 1.030    Blood, Urine TRACE-LYSED Negative    pH, UA 5.5 5.0 - 9.0    Protein, UA Negative Negative mg/dL    Urobilinogen, Urine 0.2 <2.0 E.U./dL    Nitrite, Urine Negative Negative Leukocyte Esterase, Urine Negative Negative   Microscopic Urinalysis   Result Value Ref Range    WBC, UA 0-1 0 - 5 /HPF    RBC, UA 0-1 0 - 2 /HPF    Bacteria, UA NONE SEEN None Seen /HPF   EKG 12 Lead   Result Value Ref Range    Ventricular Rate 117 BPM    Atrial Rate 117 BPM    P-R Interval 112 ms    QRS Duration 70 ms    Q-T Interval 336 ms    QTc Calculation (Bazett) 468 ms    P Axis 63 degrees    R Axis 66 degrees    T Axis 74 degrees       RADIOLOGY:  Interpreted by Radiologist.  CTA PULMONARY W CONTRAST   Final Result   No evidence of pulmonary embolism. Right middle lobe atelectasis versus pneumonia. XR CHEST PORTABLE   Final Result   No acute process. EKG Interpretation  Interpreted by emergency department physician    Rhythm: sinus tachycardia  Rate: 117  Axis: normal  Conduction: normal  ST Segments: nonspecific changes  T Waves: non specific changes    Clinical Impression: sinus tachycardia  Comparison to prior EKG: None      ------------------------- NURSING NOTES AND VITALS REVIEWED ---------------------------   The nursing notes within the ED encounter and vital signs as below have been reviewed by myself. /79   Pulse 109   Temp 98.9 °F (37.2 °C) (Oral)   Resp (!) 37   Ht 5' 6\" (1.676 m)   Wt 190 lb (86.2 kg)   SpO2 96%   BMI 30.67 kg/m²   Oxygen Saturation Interpretation: Abnormal 88 % on RA     The patients available past medical records and past encounters were reviewed.         ------------------------------ ED COURSE/MEDICAL DECISION MAKING----------------------  Medications   sodium chloride flush 0.9 % injection 5-40 mL (10 mLs Intravenous Given 4/20/21 2594)   sodium chloride flush 0.9 % injection 5-40 mL (has no administration in time range)   0.9 % sodium chloride infusion (has no administration in time range)   polyethylene glycol (GLYCOLAX) packet 17 g (has no administration in time range)   acetaminophen (TYLENOL) tablet 650 mg (has no administration in time range)     Or   acetaminophen (TYLENOL) suppository 650 mg (has no administration in time range)   trimethobenzamide (TIGAN) injection 200 mg (has no administration in time range)   methylPREDNISolone sodium (SOLU-MEDROL) injection 40 mg (40 mg Intravenous Given 4/20/21 2145)   albuterol sulfate  (90 Base) MCG/ACT inhaler 2 puff (0 puffs Inhalation Held 4/20/21 2248)   amLODIPine (NORVASC) tablet 2.5 mg (has no administration in time range)   lisinopril (PRINIVIL;ZESTRIL) tablet 20 mg (has no administration in time range)   pantoprazole (PROTONIX) tablet 40 mg (40 mg Oral Given 4/20/21 2246)   warfarin (COUMADIN) tablet 5 mg (5 mg Oral Given 4/20/21 2246)   doxycycline (VIBRAMYCIN) 100 mg in dextrose 5 % 100 mL IVPB (100 mg Intravenous New Bag 4/20/21 2247)   tiotropium (SPIRIVA RESPIMAT) 2.5 MCG/ACT inhaler 2 puff (has no administration in time range)   budesonide-formoterol (SYMBICORT) 160-4.5 MCG/ACT inhaler 2 puff (2 puffs Inhalation Given 4/20/21 2317)   cefTRIAXone (ROCEPHIN) 1,000 mg in sterile water 10 mL IV syringe (has no administration in time range)   ipratropium-albuterol (DUONEB) nebulizer solution 1 ampule (1 ampule Inhalation Given 4/20/21 1748)   acetaminophen (TYLENOL) tablet 650 mg (650 mg Oral Given 4/20/21 1909)   iopamidol (ISOVUE-370) 76 % injection 75 mL (75 mLs Intravenous Given 4/20/21 2225)             Medical Decision Making:    Patient presents with wheezing short of breath low pulse ox on room air. Recently Covid negative. She did have both her vaccinations. History of Venous thrombus embolism D-dimer is 241 patient is on Coumadin INR 1.7. Does have a significant leukocytosis. 23,000. Chest x-ray demonstrates no pneumonia. She was given Tylenol DuoNeb's. Blood cultures inflammatory markers are pending. Urine Legionella and strep are also pending. Re-Evaluations:             Re-evaluation.   Patients symptoms are improving, after DuoNeb treatments. Consultations:             Consultation was made with new hospitalist for admission. Critical Care:   CRITICAL CARE:   35 MINUTES. Please note that the withdrawal or failure to initiate urgent interventions for this patient would likely result in a life threatening deterioration or permanent disability. Accordingly this patient received the above mentioned time, excluding separately billable procedures. This patient's ED course included: a personal history and physicial examination, re-evaluation prior to disposition, multiple bedside re-evaluations, IV medications, cardiac monitoring, continuous pulse oximetry, complex medical decision making and emergency management and a personal history and physicial eaxmination    This patient has remained hemodynamically stable, improved and been closely monitored during their ED course. Counseling: The emergency provider has spoken with the patient and discussed todays results, in addition to providing specific details for the plan of care and counseling regarding the diagnosis and prognosis. Questions are answered at this time and they are agreeable with the plan.       --------------------------------- IMPRESSION AND DISPOSITION ---------------------------------    IMPRESSION  1. COPD exacerbation (HCC)    2. Leukocytosis, unspecified type    3. Acute respiratory failure with hypoxia (HCC)        DISPOSITION  Disposition: Admit to telemetry  Patient condition is serious        NOTE: This report was transcribed using voice recognition software.  Every effort was made to ensure accuracy; however, inadvertent computerized transcription errors may be present        Acacia Mac DO  04/21/21 0112

## 2021-04-20 NOTE — ED NOTES
Pt refusing blood cultures states \"maybe later she will\"      Brittany Jackson, CRISTINA  04/20/21 6426

## 2021-04-21 PROBLEM — R06.89 DYSPNEA AND RESPIRATORY ABNORMALITIES: Status: ACTIVE | Noted: 2021-04-20

## 2021-04-21 PROBLEM — R06.00 DYSPNEA AND RESPIRATORY ABNORMALITIES: Status: ACTIVE | Noted: 2021-04-20

## 2021-04-21 LAB
ANION GAP SERPL CALCULATED.3IONS-SCNC: 10 MMOL/L (ref 7–16)
ANISOCYTOSIS: ABNORMAL
BASOPHILS ABSOLUTE: 0.05 E9/L (ref 0–0.2)
BASOPHILS RELATIVE PERCENT: 0.2 % (ref 0–2)
BUN BLDV-MCNC: 17 MG/DL (ref 8–23)
CALCIUM SERPL-MCNC: 9.5 MG/DL (ref 8.6–10.2)
CHLORIDE BLD-SCNC: 100 MMOL/L (ref 98–107)
CO2: 26 MMOL/L (ref 22–29)
CREAT SERPL-MCNC: 0.8 MG/DL (ref 0.5–1)
EKG ATRIAL RATE: 117 BPM
EKG P AXIS: 63 DEGREES
EKG P-R INTERVAL: 112 MS
EKG Q-T INTERVAL: 336 MS
EKG QRS DURATION: 70 MS
EKG QTC CALCULATION (BAZETT): 468 MS
EKG R AXIS: 66 DEGREES
EKG T AXIS: 74 DEGREES
EKG VENTRICULAR RATE: 117 BPM
EOSINOPHILS ABSOLUTE: 0.01 E9/L (ref 0.05–0.5)
EOSINOPHILS RELATIVE PERCENT: 0 % (ref 0–6)
GFR AFRICAN AMERICAN: >60
GFR NON-AFRICAN AMERICAN: >60 ML/MIN/1.73
GLUCOSE BLD-MCNC: 140 MG/DL (ref 74–99)
HCT VFR BLD CALC: 38.4 % (ref 34–48)
HEMOGLOBIN: 11 G/DL (ref 11.5–15.5)
HYPOCHROMIA: ABNORMAL
IMMATURE GRANULOCYTES #: 0.2 E9/L
IMMATURE GRANULOCYTES %: 0.7 % (ref 0–5)
INR BLD: 1.8
L. PNEUMOPHILA SEROGP 1 UR AG: NORMAL
LYMPHOCYTES ABSOLUTE: 0.56 E9/L (ref 1.5–4)
LYMPHOCYTES RELATIVE PERCENT: 2.1 % (ref 20–42)
MCH RBC QN AUTO: 22 PG (ref 26–35)
MCHC RBC AUTO-ENTMCNC: 28.6 % (ref 32–34.5)
MCV RBC AUTO: 77 FL (ref 80–99.9)
MONOCYTES ABSOLUTE: 0.27 E9/L (ref 0.1–0.95)
MONOCYTES RELATIVE PERCENT: 1 % (ref 2–12)
NEUTROPHILS ABSOLUTE: 25.58 E9/L (ref 1.8–7.3)
NEUTROPHILS RELATIVE PERCENT: 96 % (ref 43–80)
OVALOCYTES: ABNORMAL
PDW BLD-RTO: 17.5 FL (ref 11.5–15)
PLATELET # BLD: 591 E9/L (ref 130–450)
PMV BLD AUTO: 9.8 FL (ref 7–12)
POIKILOCYTES: ABNORMAL
POLYCHROMASIA: ABNORMAL
POTASSIUM REFLEX MAGNESIUM: 4.8 MMOL/L (ref 3.5–5)
PROTHROMBIN TIME: 19.4 SEC (ref 9.3–12.4)
RBC # BLD: 4.99 E12/L (ref 3.5–5.5)
SODIUM BLD-SCNC: 136 MMOL/L (ref 132–146)
STREP PNEUMONIAE ANTIGEN, URINE: NORMAL
WBC # BLD: 26.7 E9/L (ref 4.5–11.5)

## 2021-04-21 PROCEDURE — 2580000003 HC RX 258: Performed by: NURSE PRACTITIONER

## 2021-04-21 PROCEDURE — 2060000000 HC ICU INTERMEDIATE R&B

## 2021-04-21 PROCEDURE — 6370000000 HC RX 637 (ALT 250 FOR IP): Performed by: NURSE PRACTITIONER

## 2021-04-21 PROCEDURE — 36415 COLL VENOUS BLD VENIPUNCTURE: CPT

## 2021-04-21 PROCEDURE — 97161 PT EVAL LOW COMPLEX 20 MIN: CPT

## 2021-04-21 PROCEDURE — 85610 PROTHROMBIN TIME: CPT

## 2021-04-21 PROCEDURE — 6360000002 HC RX W HCPCS: Performed by: NURSE PRACTITIONER

## 2021-04-21 PROCEDURE — 2580000003 HC RX 258: Performed by: EMERGENCY MEDICINE

## 2021-04-21 PROCEDURE — 80048 BASIC METABOLIC PNL TOTAL CA: CPT

## 2021-04-21 PROCEDURE — 97535 SELF CARE MNGMENT TRAINING: CPT

## 2021-04-21 PROCEDURE — 87081 CULTURE SCREEN ONLY: CPT

## 2021-04-21 PROCEDURE — 97165 OT EVAL LOW COMPLEX 30 MIN: CPT

## 2021-04-21 PROCEDURE — 85025 COMPLETE CBC W/AUTO DIFF WBC: CPT

## 2021-04-21 PROCEDURE — 94664 DEMO&/EVAL PT USE INHALER: CPT

## 2021-04-21 PROCEDURE — 97530 THERAPEUTIC ACTIVITIES: CPT

## 2021-04-21 PROCEDURE — 2700000000 HC OXYGEN THERAPY PER DAY

## 2021-04-21 PROCEDURE — 94640 AIRWAY INHALATION TREATMENT: CPT

## 2021-04-21 PROCEDURE — 87040 BLOOD CULTURE FOR BACTERIA: CPT

## 2021-04-21 PROCEDURE — 94660 CPAP INITIATION&MGMT: CPT

## 2021-04-21 PROCEDURE — 2500000003 HC RX 250 WO HCPCS: Performed by: NURSE PRACTITIONER

## 2021-04-21 PROCEDURE — 6360000002 HC RX W HCPCS: Performed by: EMERGENCY MEDICINE

## 2021-04-21 RX ORDER — ARFORMOTEROL TARTRATE 15 UG/2ML
15 SOLUTION RESPIRATORY (INHALATION) 2 TIMES DAILY
Status: DISCONTINUED | OUTPATIENT
Start: 2021-04-21 | End: 2021-04-23 | Stop reason: HOSPADM

## 2021-04-21 RX ORDER — BUDESONIDE 0.5 MG/2ML
500 INHALANT ORAL 2 TIMES DAILY
Status: DISCONTINUED | OUTPATIENT
Start: 2021-04-21 | End: 2021-04-23 | Stop reason: HOSPADM

## 2021-04-21 RX ORDER — IPRATROPIUM BROMIDE AND ALBUTEROL SULFATE 2.5; .5 MG/3ML; MG/3ML
1 SOLUTION RESPIRATORY (INHALATION)
Status: DISCONTINUED | OUTPATIENT
Start: 2021-04-22 | End: 2021-04-23 | Stop reason: HOSPADM

## 2021-04-21 RX ORDER — METHYLPREDNISOLONE SODIUM SUCCINATE 40 MG/ML
40 INJECTION, POWDER, LYOPHILIZED, FOR SOLUTION INTRAMUSCULAR; INTRAVENOUS DAILY
Status: DISCONTINUED | OUTPATIENT
Start: 2021-04-22 | End: 2021-04-23 | Stop reason: HOSPADM

## 2021-04-21 RX ADMIN — SODIUM CHLORIDE, PRESERVATIVE FREE 10 ML: 5 INJECTION INTRAVENOUS at 11:24

## 2021-04-21 RX ADMIN — PANTOPRAZOLE SODIUM 40 MG: 40 TABLET, DELAYED RELEASE ORAL at 09:59

## 2021-04-21 RX ADMIN — PANTOPRAZOLE SODIUM 40 MG: 40 TABLET, DELAYED RELEASE ORAL at 21:58

## 2021-04-21 RX ADMIN — CEFTRIAXONE SODIUM 1000 MG: 1 INJECTION, POWDER, FOR SOLUTION INTRAMUSCULAR; INTRAVENOUS at 01:30

## 2021-04-21 RX ADMIN — ALBUTEROL SULFATE 2 PUFF: 90 AEROSOL, METERED RESPIRATORY (INHALATION) at 03:45

## 2021-04-21 RX ADMIN — DOXYCYCLINE 100 MG: 100 INJECTION, POWDER, LYOPHILIZED, FOR SOLUTION INTRAVENOUS at 21:58

## 2021-04-21 RX ADMIN — ALBUTEROL SULFATE 2 PUFF: 90 AEROSOL, METERED RESPIRATORY (INHALATION) at 13:35

## 2021-04-21 RX ADMIN — ALBUTEROL SULFATE 2 PUFF: 90 AEROSOL, METERED RESPIRATORY (INHALATION) at 08:18

## 2021-04-21 RX ADMIN — BUDESONIDE AND FORMOTEROL FUMARATE DIHYDRATE 2 PUFF: 160; 4.5 AEROSOL RESPIRATORY (INHALATION) at 08:18

## 2021-04-21 RX ADMIN — LISINOPRIL 20 MG: 20 TABLET ORAL at 09:59

## 2021-04-21 RX ADMIN — AMLODIPINE BESYLATE 2.5 MG: 2.5 TABLET ORAL at 09:59

## 2021-04-21 RX ADMIN — TIOTROPIUM BROMIDE INHALATION SPRAY 2 PUFF: 3.12 SPRAY, METERED RESPIRATORY (INHALATION) at 08:18

## 2021-04-21 RX ADMIN — SODIUM CHLORIDE, PRESERVATIVE FREE 10 ML: 5 INJECTION INTRAVENOUS at 14:20

## 2021-04-21 RX ADMIN — DOXYCYCLINE 100 MG: 100 INJECTION, POWDER, LYOPHILIZED, FOR SOLUTION INTRAVENOUS at 10:02

## 2021-04-21 RX ADMIN — METHYLPREDNISOLONE SODIUM SUCCINATE 40 MG: 40 INJECTION, POWDER, LYOPHILIZED, FOR SOLUTION INTRAMUSCULAR; INTRAVENOUS at 14:20

## 2021-04-21 RX ADMIN — WARFARIN SODIUM 5 MG: 5 TABLET ORAL at 17:42

## 2021-04-21 RX ADMIN — METHYLPREDNISOLONE SODIUM SUCCINATE 40 MG: 40 INJECTION, POWDER, LYOPHILIZED, FOR SOLUTION INTRAMUSCULAR; INTRAVENOUS at 06:12

## 2021-04-21 RX ADMIN — SODIUM CHLORIDE, PRESERVATIVE FREE 10 ML: 5 INJECTION INTRAVENOUS at 22:00

## 2021-04-21 RX ADMIN — ALBUTEROL SULFATE 2 PUFF: 90 AEROSOL, METERED RESPIRATORY (INHALATION) at 19:44

## 2021-04-21 RX ADMIN — BUDESONIDE AND FORMOTEROL FUMARATE DIHYDRATE 2 PUFF: 160; 4.5 AEROSOL RESPIRATORY (INHALATION) at 19:45

## 2021-04-21 ASSESSMENT — PAIN SCALES - GENERAL: PAINLEVEL_OUTOF10: 0

## 2021-04-21 NOTE — CARE COORDINATION
4/21/2021  Social Work Discharge Planning: This worker met with Pt to discuss  role and transition of care/discharge planning. Pt has a home in [place] but has been stayng with her supportive daughter at her home in [place]. Pt is on 3l o2 here and uses 3l via [facility] D[facility]-confirmed-. Awaiting therapy evals; however, Pt is currently declining HHC. Pt is on IVATB. Await ATB plan. Pt uses a hurricane cane at home. [facility]acy is [facility] in [place] and PCP is Dr. Vignesh Buckley.  Electronically signed by AGUSTÍN Downs on 4/21/2021 at 10:51 AM

## 2021-04-21 NOTE — PROGRESS NOTES
Occupational Therapy  OCCUPATIONAL THERAPY INITIAL EVALUATION      Date:2021  Patient Name: Johana Caro  MRN: [id number]  : 1940  Room: [address] [place] [address]    Referring Provider: YOSEPH Rivers CNP    Evaluating OT: Daniel Dye OTR/HENRIETTA DE LA [id number]    AM-PAC Daily Activity Raw Score: 18/24    Recommended Adaptive Equipment: TBD    Diagnosis: COPD exacerbation. Pt presents to ED from home with cough. Pertinent Medical History: COPD, HTN   Precautions:  Falls, O2     Home Living: Pt lives with daughter, who works during the day, in a 2 story home with B/B 1st floor. Bathroom setup: tub/shower combo with seat, standard commode     Prior Level of Function: Mod I with ADLs except daughter PRN assist in bathing, daughter assists with IADLs; completed functional mobility with hurry cane, has rollator. Pt reports she furniture walks in her home. Use of 2L O2 PRN at home. Pain Level: pt reports \"I never have any pain\". Then reports chronic neck pain relieved by sitting up in chair and out of bed. Cognition: A&O: 3-4/4. Pt is alert and oriented but easily confused. Difficulty in understanding benefits of getting up and using commode for toileting verses staying in chair and just using purewick.     Problem solving:  fair   Judgement/safety:  fair     Functional Assessment:   Initial Eval Status  Date: 21 Treatment session:  Short Term Goals     Feeding Independent     Grooming SBA  Standing sink level for hand hygiene  Independent   UB Dressing Min A  Donning/doffing hospital gown  Independent   LB Dressing Min A  Donning/doffing brief  Mod I    Bathing Mod A  SBA   Toileting Min A  Use of grab bar for support in transfer  Able to manage eileen care  assist in brief management  Mod I   Bed Mobility  Supine to sit: out of bed on entry     Functional Transfers STS: SBA  Mod I   Functional Mobility CGA with hurry cane  Household distance  Mod I during ADLs   Balance Sitting: fair plus    Standing: fair/fair minus hurry cane     Activity Tolerance Fair minus  O2 2L   Restin%  Post activity: 97%  standing marcella x6-7 min with fair plus balance during self care tasks             Treatment: Patient educated on techniques for completion of ADL, safe functional transfers and functional mobility. Patient required cues for follow through with proper hand/foot placement, pacing, safety, compensatory strategies, breathing and technique in functional transfers, functional mobility, toileting, grooming, UB dressing and LB dressing in preparation for maximum independence in all self care tasks.      Hand Dominance: Right []  Left []   Strength ROM Additional Info:    RUE  4-/5 WFL good  and FMC/dexterity noted during ADL tasks     LUE 4-/5 WFL limited end range shoulder motion good  and FMC/dexterity noted during ADL tasks         Hearing: WFL   Vision: WFL   Sensation:  No c/o numbness or tingling   Tone: WFL   Edema: none                             Long Term Goal (1-3 wks): Pt will maximize functional performance in all self care tasks/functional transfers with good follow through of all trained techniques for safe transition to next level of care    Assessment of current deficits   Functional mobility [x]  ADLs [x] Strength [x]  Cognition []  Functional transfers  [x] IADLs [x] Safety Awareness [x]  Endurance [x]  Fine Motor Coordination [] Balance [x] Vision/perception [] Sensation []   Gross Motor Coordination [] ROM [] Delirium []                  Motor Control []    Plan of Care: 2-5 days/week for 1-2 weeks PRN   [x]ADL retraining/adaptive techniques and AE recommendations to increase functional independence within precautions                    [x]Energy conservation techniques to improve tolerance for ADL/IADLs  [x]Functional transfer/mobility training/DME recommendations for increased independence, safety and fall prevention         [x]Patient/family education to increase safety and functional independence during daily routine          [x]Environmental modifications for safe mobility and completion of ADLs                             []Cognitive retraining to improve problem solving skills & safe participation in ADLs/IADLs     []Sensory re-education techniques to improve extremity awareness, maintain skin integrity and improve hand function                             []Visual/Perceptual retraining to improve body awareness and safety during transfers and ADLs  []Splinting/positioning needs to maintain joint/skin integrity and contracture prevention  [x]Therapeutic activity to improve functional performance during ADLs                                        [x]Therapeutic exercise to improve tolerance and functional strength for ADLs   [x]Balance retraining/tolerance tasks for facilitation of postural control with dynamic challenges during ADLs  []Neuromuscular re-education to facilitate righting/equilibrium reactions, midline orientation, scapular stability/mobility, normalize muscle tone and facilitate active functional movement                        []Delirium prevention/treatment    [x]Positioning to improve functional independence and decrease risk of skin breakdown  []Other:     Rehab Potential: Good for established goals     Patient/Family Goal: To get home. Patient and/or family were instructed on functional diagnosis, prognosis/goals and OT plan of care. Pt verbalized understanding. Upon arrival, patient seated in armchair. At end of session, patient seated in armchair with call light and phone within reach, all lines and tubes intact. Pt would benefit from continued skilled OT to increase safety and independence with completion of ADL/IADL tasks for functional independence and quality of life.  Bed/chair alarm: ON    Low Evaluation 58040  Time In: 1045   Time Out: [address]     ADL/Self Care 38350 10 1 Orthotic Management 54589     Neuro Re-Ed 65962     TOTAL TIMED TREATMENT 10 1       Evaluation time includes thorough review of current medical information, gathering information on past medical history/social history and prior level of function, completion of standardized testing/informal observation of tasks, assessment of data, and development of POC/Goals    Naeem Barger OTR/L  [id number]

## 2021-04-21 NOTE — PROGRESS NOTES
Date: 4/20/2021    Time: 11:35 PM    Patient Placed On BIPAP/CPAP/ Non-Invasive Ventilation? Yes    If no must comment. Facial area red/color change? No           If YES are Blister/Lesion present? No   If yes must notify nursing staff  BIPAP/CPAP skin barrier? Yes    Skin barrier type:mepilexlite       Comments: Pt unsure of home setting, placed on last ordered setting of 12/6. Mepilex placed at this time.          Don Beckman     04/20/21 2334   NIV Type   Mode Bilevel   Mask Type Full face mask   Mask Size Small   Settings/Measurements   IPAP 12 cmH20   CPAP/EPAP 6 cmH2O   Rate Ordered 12   Resp (!) 37   Vt Exhaled 532 mL   Minute Volume 21.9 Liters   Mask Leak (lpm) 28 lpm   Comfort Level Good   Using Accessory Muscles No   SpO2 100

## 2021-04-21 NOTE — PROGRESS NOTES
Physical Therapy    Facility/Department: [address] INTERNAL MEDICINE 2  Initial Assessment    NAME: Addy Reddy  : 1940  MRN: [id number]    Date of Service: 2021       REQUIRES PT FOLLOW UP: Yes       Patient Diagnosis(es): The primary encounter diagnosis was COPD exacerbation ([facility] Diagnoses of Leukocytosis, unspecified type and Acute respiratory failure with hypoxia ([facility] were also pertinent to this visit. has a past medical history of COPD (chronic obstructive pulmonary disease) ([facility] DVT (deep venous thrombosis) ([facility] Hypertension, and Pulmonary embolism ([facility] has a past surgical history that includes Cholecystectomy; Mastectomy, bilateral (); Carpal tunnel release (Right); and Hysterectomy. Evaluating Therapist: Rehan Ruffin, PT     Referring Provider:   Dr. Nae Todd #:  [id number]  DIAGNOSIS: COPD exac  PRECAUTIONS: falls, decreased vision     Social:  Pt lives with daughter   in a  1  floor plan 2  steps and 1 rails to enter. Prior to admission pt walked with cane. Reports she furniture walks. Has rollator , home O2      Initial Evaluation  Date:  2021  Treatment      Short Term/ Long Term   Goals   Was pt agreeable to Eval/treatment? yes      Does pt have pain? None reported      Bed Mobility  Rolling:  NT   Supine to sit:  NT   Sit to supine:  NT   Scooting:  SBA in sit   Pt sitting in chair upon arrival    S/I    Transfers Sit to stand:  SBA   Stand to sit:  SBA   Stand pivot:  CGA   S/I   Ambulation     15 and 50  feet with  Cane  with  CGA    100  feet with AAD  with  S/I        Stair negotiation: ascended and descended NT    2-4  steps with  1  rail with  SBA /CGA    LE ROM  WFL      LE strength  4- / 5      AM- PAC RAW score   16/24            Pt is alert and Oriented x  3      Balance:  CGA. Fall risk due to decreased safety awareness and LE weakness  Endurance: decreased   Bed/Chair alarm:   Yes      ASSESSMENT  Pt displays functional ability as noted in the objective portion of this evaluation. Treatment/Education:    Mobility as above. Cues for hand placement with transfers and for controlled sit. Pt used cane for gait but also grabs for environmental supports frequently. Instructed to use rollator at home as needed to increase gait safety and decrease fall risk,   O2@ 4LNC. Pulse ox at rest 93%, 97% after gait. Pt educated on fall risk,  Proper breathing technique, safe and proper technique with mobility        Patient response to education:   Pt verbalized understanding Pt demonstrated skill Pt requires further education in this area   x  no, needs cues   x       Comments:  Pt left  In chair after session, with call light in reach. Rehab potential is Good for reaching above PT goals. Pts/ family goals   1. None stated     Patient and or family understand(s) diagnosis, prognosis, and plan of care. - yes     PLAN  PT care will be provided in accordance with the objectives noted above. Whenever appropriate, clear delegation orders will be provided for nursing staff. Exercises and functional mobility practice will be used as well as appropriate assistive devices or modalities to obtain goals. Patient and family education will also be administered as needed. PLAN OF CARE:    Current Treatment Recommendations     [x] Strengthening     [] ROM   [x] Balance Training   [x] Endurance Training   [x] Transfer Training   [x] Gait Training   [x] Stair Training   [x] Positioning   [x] Safety and Education Training   [x] Patient/Caregiver Education   [] HEP  [] Other       Frequency of treatments will be 2-5x/week x  7 -14 days.     Time in:1044   Time out: 1105       Evaluation Time includes thorough review of current medical information, gathering information on past medical history/social history and prior level of function, completion of standardized testing/informal observation of tasks, assessment of data and education on plan of care and goals.    CPT codes:  [x] Low Complexity PT evaluation 69476  [] Moderate Complexity PT evaluation 83926  [] High Complexity PT evaluation 12466  [] PT Re-evaluation 85549  [] Gait training 25544  minutes  [x] Therapeutic activities 87855 10 minutes  [] Therapeutic exercises 87580  minutes  [] Neuromuscular reeducation 33571 minutes       Sara Hager number:  PT [phone]

## 2021-04-21 NOTE — H&P
1 tablet by mouth 4 times daily as needed for Cough (Patient not taking: Reported on 4/15/2021)  Respiratory Therapy Supplies (FULL KIT NEBULIZER SET) MISC, Use as directed with nebulized medication. Allergies:    Carafate [sucralfate]    Social History:    reports that she has been smoking cigarettes. She started smoking about 68 years ago. She has a 32.50 pack-year smoking history. She has never used smokeless tobacco. She reports previous alcohol use. She reports that she does not use drugs. Family History:   family history includes Heart Attack in her mother; Other in her brother. REVIEW OF SYSTEMS:  As above in the HPI, otherwise negative    PHYSICAL EXAM:    Vitals:  /79   Pulse 109   Temp 98.9 °F (37.2 °C) (Oral)   Resp (!) 34   Ht 5' 6\" (1.676 m)   Wt 194 lb 6.4 oz (88.2 kg)   SpO2 98%   BMI 31.38 kg/m²     General:  Awake, alert, oriented X 3. Well developed, well nourished, well groomed. No apparent distress. HEENT:  Normocephalic, atraumatic. Pupils equal, round, reactive to light. No scleral icterus. No conjunctival injection. Normal lips, teeth, and gums. No nasal discharge. Neck:  Supple  Heart:  RRR, no murmurs, gallops, rubs  Lungs:  CTA bilaterally, bilat symmetrical expansion, no wheeze, rales, or rhonchi  Abdomen:   Bowel sounds present, soft, nontender, no masses, no organomegaly, no peritoneal signs  Extremities:  No clubbing, cyanosis, or edema  Skin:  Warm and dry, no open lesions or rash  Neuro:  Cranial nerves 2-12 intact, no focal deficits  Breast: deferred  Rectal: deferred  Genitalia:  deferred    LABS:    CBC with Differential:    Lab Results   Component Value Date    WBC 26.7 04/21/2021    RBC 4.99 04/21/2021    HGB 11.0 04/21/2021    HCT 38.4 04/21/2021     04/21/2021    MCV 77.0 04/21/2021    MCH 22.0 04/21/2021    MCHC 28.6 04/21/2021    RDW 17.5 04/21/2021    LYMPHOPCT 2.1 04/21/2021    MONOPCT 1.0 04/21/2021    BASOPCT 0.2 04/21/2021    MONOSABS 0.27 04/21/2021    LYMPHSABS 0.56 04/21/2021    EOSABS 0.01 04/21/2021    BASOSABS 0.05 04/21/2021     CMP:    Lab Results   Component Value Date     04/21/2021    K 4.8 04/21/2021     04/21/2021    CO2 26 04/21/2021    BUN 17 04/21/2021    CREATININE 0.8 04/21/2021    GFRAA >60 04/21/2021    LABGLOM >60 04/21/2021    GLUCOSE 140 04/21/2021    PROT 7.5 04/20/2021    LABALBU 4.2 04/20/2021    CALCIUM 9.5 04/21/2021    BILITOT 0.3 04/20/2021    ALKPHOS 142 04/20/2021    AST 12 04/20/2021    ALT 7 04/20/2021     Magnesium:    Lab Results   Component Value Date    MG 1.5 01/26/2021     Phosphorus:  No results found for: PHOS  PT/INR:    Lab Results   Component Value Date    PROTIME 19.4 04/21/2021    INR 1.8 04/21/2021     Last 3 Troponin:    Lab Results   Component Value Date    TROPONINI <0.01 04/20/2021    TROPONINI <0.01 01/26/2021     U/A:    Lab Results   Component Value Date    COLORU Yellow 04/20/2021    PROTEINU Negative 04/20/2021    PHUR 5.5 04/20/2021    WBCUA 0-1 04/20/2021    RBCUA 0-1 04/20/2021    BACTERIA NONE SEEN 04/20/2021    CLARITYU Clear 04/20/2021    SPECGRAV 1.015 04/20/2021    LEUKOCYTESUR Negative 04/20/2021    UROBILINOGEN 0.2 04/20/2021    BILIRUBINUR Negative 04/20/2021    BLOODU TRACE-LYSED 04/20/2021    GLUCOSEU Negative 04/20/2021     ABG:  No results found for: PH, PCO2, PO2, HCO3, BE, THGB, TCO2, O2SAT  HgBA1c:    Lab Results   Component Value Date    LABA1C 5.8 01/27/2021     FLP:  No results found for: TRIG, HDL, LDLCALC, LDLDIRECT, LABVLDL  TSH:  No results found for: TSH    CTA PULMONARY W CONTRAST   Final Result   No evidence of pulmonary embolism. Right middle lobe atelectasis versus pneumonia. XR CHEST PORTABLE   Final Result   No acute process.              ASSESSMENT:      Active Problems:    COPD exacerbation (HCC)    FAISAL (obstructive sleep apnea)    Leukocytosis    Dyspnea and respiratory abnormalities    Essential hypertension    Tobacco abuse History of pulmonary embolism    History of DVT (deep vein thrombosis)    Subtherapeutic international normalized ratio (INR)    Chronic anemia  Resolved Problems:    * No resolved hospital problems.  *      PLAN:    55-year-old female history of COPD and VTE on Coumadin admitted with shortness of breath    Admit  IV steroids  Nebulizers  IV ABX  proCalcitonin   Medications for other co morbidities cont as appropriate w dosage adjustments as necessary  PT/OT  DVT PPx  DC planning     Electronically signed by Krista Vargas MD on 4/21/2021 at 9:42 AM

## 2021-04-22 PROBLEM — D50.9 MICROCYTIC ANEMIA: Status: ACTIVE | Noted: 2021-04-22

## 2021-04-22 PROBLEM — D75.839 THROMBOCYTOSIS: Status: ACTIVE | Noted: 2021-04-22

## 2021-04-22 LAB
ANION GAP SERPL CALCULATED.3IONS-SCNC: 8 MMOL/L (ref 7–16)
BUN BLDV-MCNC: 25 MG/DL (ref 6–23)
CALCIUM SERPL-MCNC: 9.5 MG/DL (ref 8.6–10.2)
CHLORIDE BLD-SCNC: 104 MMOL/L (ref 98–107)
CO2: 28 MMOL/L (ref 22–29)
CREAT SERPL-MCNC: 0.8 MG/DL (ref 0.5–1)
FERRITIN: 28 NG/ML
GFR AFRICAN AMERICAN: >60
GFR NON-AFRICAN AMERICAN: >60 ML/MIN/1.73
GLUCOSE BLD-MCNC: 112 MG/DL (ref 74–99)
HCT VFR BLD CALC: 34.5 % (ref 34–48)
HEMOGLOBIN: 10.1 G/DL (ref 11.5–15.5)
INR BLD: 1.8
IRON SATURATION: 6 % (ref 15–50)
IRON: 20 MCG/DL (ref 37–145)
MCH RBC QN AUTO: 22.2 PG (ref 26–35)
MCHC RBC AUTO-ENTMCNC: 29.3 % (ref 32–34.5)
MCV RBC AUTO: 75.8 FL (ref 80–99.9)
PATHOLOGIST REVIEW: NORMAL
PDW BLD-RTO: 17.5 FL (ref 11.5–15)
PLATELET # BLD: 520 E9/L (ref 130–450)
PMV BLD AUTO: 9.8 FL (ref 7–12)
POTASSIUM SERPL-SCNC: 4.2 MMOL/L (ref 3.5–5)
PROCALCITONIN: 0.15 NG/ML (ref 0–0.08)
PROTHROMBIN TIME: 20 SEC (ref 9.3–12.4)
RBC # BLD: 4.55 E12/L (ref 3.5–5.5)
SODIUM BLD-SCNC: 140 MMOL/L (ref 132–146)
TOTAL IRON BINDING CAPACITY: 355 MCG/DL (ref 250–450)
WBC # BLD: 16.7 E9/L (ref 4.5–11.5)

## 2021-04-22 PROCEDURE — 82728 ASSAY OF FERRITIN: CPT

## 2021-04-22 PROCEDURE — 85610 PROTHROMBIN TIME: CPT

## 2021-04-22 PROCEDURE — 94640 AIRWAY INHALATION TREATMENT: CPT

## 2021-04-22 PROCEDURE — 84145 PROCALCITONIN (PCT): CPT

## 2021-04-22 PROCEDURE — 36415 COLL VENOUS BLD VENIPUNCTURE: CPT

## 2021-04-22 PROCEDURE — 6370000000 HC RX 637 (ALT 250 FOR IP): Performed by: NURSE PRACTITIONER

## 2021-04-22 PROCEDURE — 83540 ASSAY OF IRON: CPT

## 2021-04-22 PROCEDURE — 6360000002 HC RX W HCPCS: Performed by: INTERNAL MEDICINE

## 2021-04-22 PROCEDURE — 80048 BASIC METABOLIC PNL TOTAL CA: CPT

## 2021-04-22 PROCEDURE — 2580000003 HC RX 258: Performed by: NURSE PRACTITIONER

## 2021-04-22 PROCEDURE — 85027 COMPLETE CBC AUTOMATED: CPT

## 2021-04-22 PROCEDURE — 83550 IRON BINDING TEST: CPT

## 2021-04-22 PROCEDURE — 2500000003 HC RX 250 WO HCPCS: Performed by: NURSE PRACTITIONER

## 2021-04-22 PROCEDURE — 2060000000 HC ICU INTERMEDIATE R&B

## 2021-04-22 PROCEDURE — 2700000000 HC OXYGEN THERAPY PER DAY

## 2021-04-22 PROCEDURE — 94660 CPAP INITIATION&MGMT: CPT

## 2021-04-22 PROCEDURE — 6370000000 HC RX 637 (ALT 250 FOR IP): Performed by: INTERNAL MEDICINE

## 2021-04-22 RX ADMIN — ARFORMOTEROL TARTRATE 15 MCG: 15 SOLUTION RESPIRATORY (INHALATION) at 19:44

## 2021-04-22 RX ADMIN — IPRATROPIUM BROMIDE AND ALBUTEROL SULFATE 1 AMPULE: .5; 3 SOLUTION RESPIRATORY (INHALATION) at 09:19

## 2021-04-22 RX ADMIN — LISINOPRIL 20 MG: 20 TABLET ORAL at 09:37

## 2021-04-22 RX ADMIN — SODIUM CHLORIDE, PRESERVATIVE FREE 5 ML: 5 INJECTION INTRAVENOUS at 23:31

## 2021-04-22 RX ADMIN — SODIUM CHLORIDE, PRESERVATIVE FREE 10 ML: 5 INJECTION INTRAVENOUS at 09:38

## 2021-04-22 RX ADMIN — IPRATROPIUM BROMIDE AND ALBUTEROL SULFATE 1 AMPULE: .5; 3 SOLUTION RESPIRATORY (INHALATION) at 15:58

## 2021-04-22 RX ADMIN — PANTOPRAZOLE SODIUM 40 MG: 40 TABLET, DELAYED RELEASE ORAL at 09:37

## 2021-04-22 RX ADMIN — ARFORMOTEROL TARTRATE 15 MCG: 15 SOLUTION RESPIRATORY (INHALATION) at 09:19

## 2021-04-22 RX ADMIN — BUDESONIDE 500 MCG: 0.5 SUSPENSION RESPIRATORY (INHALATION) at 09:19

## 2021-04-22 RX ADMIN — METHYLPREDNISOLONE SODIUM SUCCINATE 40 MG: 40 INJECTION, POWDER, LYOPHILIZED, FOR SOLUTION INTRAMUSCULAR; INTRAVENOUS at 09:38

## 2021-04-22 RX ADMIN — WARFARIN SODIUM 5 MG: 5 TABLET ORAL at 18:23

## 2021-04-22 RX ADMIN — IPRATROPIUM BROMIDE AND ALBUTEROL SULFATE 1 AMPULE: .5; 3 SOLUTION RESPIRATORY (INHALATION) at 19:44

## 2021-04-22 RX ADMIN — IPRATROPIUM BROMIDE AND ALBUTEROL SULFATE 1 AMPULE: .5; 3 SOLUTION RESPIRATORY (INHALATION) at 13:36

## 2021-04-22 RX ADMIN — DOXYCYCLINE 100 MG: 100 INJECTION, POWDER, LYOPHILIZED, FOR SOLUTION INTRAVENOUS at 23:30

## 2021-04-22 RX ADMIN — AMLODIPINE BESYLATE 2.5 MG: 2.5 TABLET ORAL at 09:37

## 2021-04-22 RX ADMIN — BUDESONIDE 500 MCG: 0.5 SUSPENSION RESPIRATORY (INHALATION) at 19:44

## 2021-04-22 RX ADMIN — DOXYCYCLINE 100 MG: 100 INJECTION, POWDER, LYOPHILIZED, FOR SOLUTION INTRAVENOUS at 09:38

## 2021-04-22 RX ADMIN — PANTOPRAZOLE SODIUM 40 MG: 40 TABLET, DELAYED RELEASE ORAL at 19:52

## 2021-04-22 ASSESSMENT — PAIN SCALES - GENERAL: PAINLEVEL_OUTOF10: 0

## 2021-04-22 NOTE — PROGRESS NOTES
Physical Therapy  Facility/Department: [address] INTERNAL MEDICINE 2    NAME: Ana Luisa Madera  : 1940  MRN: [id number]    Chart reviewed and PT treatment attempted this pm.  Pt declined at this time. Will check back at later time/date.      Nani Karimi, [address]

## 2021-04-22 NOTE — PROGRESS NOTES
Subjective:  Feeling better   No CP or SOB  No fever or chills   No uncontrolled pain  No vomiting or diarrhea     Objective:    BP (!) 144/67   Pulse 87   Temp 98.5 °F (36.9 °C) (Oral)   Resp 20   Ht 5' 6\" (1.676 m)   Wt 199 lb 9.6 oz (90.5 kg)   SpO2 97%   BMI 32.22 kg/m²     24HR INTAKE/OUTPUT:      Intake/Output Summary (Last 24 hours) at 4/22/2021 1140  Last data filed at 4/22/2021 1037  Gross per 24 hour   Intake 280 ml   Output 2400 ml   Net -2120 ml       General appearance: NAD, conversant  Neck: FROM, supple   Lungs: Clear bilaterally no wheezes, no rhonchi, no crackles  CV: RRR, no MRGs; normal carotid upstroke and amplitude without Bruits  Abdomen: Soft, non-tender; no masses or HSM  Extremities: No edema, no cyanosis, no clubbing  Skin: Intact no rash, no lesions, no ulcers    Psych: Alert and oriented normal affect  Neuro: Nonfocal  Most Recent Labs  Lab Results   Component Value Date    WBC 16.7 (H) 04/22/2021    HGB 10.1 (L) 04/22/2021    HCT 34.5 04/22/2021     (H) 04/22/2021     04/22/2021    K 4.2 04/22/2021     04/22/2021    CREATININE 0.8 04/22/2021    BUN 25 (H) 04/22/2021    CO2 28 04/22/2021    GLUCOSE 112 (H) 04/22/2021    ALT 7 04/20/2021    AST 12 04/20/2021    INR 1.8 04/22/2021    LABA1C 5.8 (H) 01/27/2021     No results for input(s): MG in the last 72 hours. Lab Results   Component Value Date    CALCIUM 9.5 04/22/2021        CTA PULMONARY W CONTRAST   Final Result   No evidence of pulmonary embolism. Right middle lobe atelectasis versus pneumonia. XR CHEST PORTABLE   Final Result   No acute process.              Assessment    Active Problems:    COPD exacerbation (HCC)    FAISAL (obstructive sleep apnea)    Leukocytosis    Dyspnea and respiratory abnormalities    Essential hypertension    Tobacco abuse    History of pulmonary embolism    History of DVT (deep vein thrombosis)    Subtherapeutic international normalized ratio (INR)    Chronic anemia Microcytic anemia    Thrombocytosis (HCC)  Resolved Problems:    * No resolved hospital problems.  *      Plan:     72-year-old female history of COPD and VTE on Coumadin admitted with shortness of breath     Admit  IV steroids-->PO  Nebulizers  IV ABX   MRSA screen  BCx NGTD  UA negative, UCx + like colonization  proCalcitonin low, 0.12, 0.15  Tobacco cessation counseled  bipap hs  Leukocytosis appears chronic-check eileen smear  -Results reviewed  -Check iron studies  Medications for other co morbidities cont as appropriate w dosage adjustments as necessary  PT/OT  DVT PPx  DC planning     Electronically signed by Makayla Magallanes MD on 4/22/2021 at 11:40 AM

## 2021-04-22 NOTE — CARE COORDINATION
4/22/2021  Social Work Discharge [address] 16/24. SW confirmed with Pt that she plans to return to her daughters home at discharge. Pt is now on 1l o2 here and uses 3l via [facility]. Pt has a CPAP at home and is currently of IV ATB. Pt is declining HHC unless needed for ATB. Pt also has a cane at home. Electronically signed by AGUSTÍN Ware on 4/22/2021 at 10:33 AM  '

## 2021-04-23 VITALS
HEART RATE: 99 BPM | BODY MASS INDEX: 31.15 KG/M2 | RESPIRATION RATE: 18 BRPM | HEIGHT: 66 IN | DIASTOLIC BLOOD PRESSURE: 58 MMHG | TEMPERATURE: 98 F | WEIGHT: 193.8 LBS | SYSTOLIC BLOOD PRESSURE: 121 MMHG | OXYGEN SATURATION: 94 %

## 2021-04-23 LAB
INR BLD: 1.8
MRSA CULTURE ONLY: NORMAL
ORGANISM: ABNORMAL
PROTHROMBIN TIME: 19.4 SEC (ref 9.3–12.4)
URINE CULTURE, ROUTINE: ABNORMAL
URINE CULTURE, ROUTINE: ABNORMAL

## 2021-04-23 PROCEDURE — 6370000000 HC RX 637 (ALT 250 FOR IP): Performed by: NURSE PRACTITIONER

## 2021-04-23 PROCEDURE — 85610 PROTHROMBIN TIME: CPT

## 2021-04-23 PROCEDURE — 2580000003 HC RX 258: Performed by: NURSE PRACTITIONER

## 2021-04-23 PROCEDURE — 97530 THERAPEUTIC ACTIVITIES: CPT

## 2021-04-23 PROCEDURE — 6360000002 HC RX W HCPCS: Performed by: INTERNAL MEDICINE

## 2021-04-23 PROCEDURE — 36415 COLL VENOUS BLD VENIPUNCTURE: CPT

## 2021-04-23 PROCEDURE — 2580000003 HC RX 258: Performed by: INTERNAL MEDICINE

## 2021-04-23 PROCEDURE — 2700000000 HC OXYGEN THERAPY PER DAY

## 2021-04-23 PROCEDURE — 6370000000 HC RX 637 (ALT 250 FOR IP): Performed by: INTERNAL MEDICINE

## 2021-04-23 PROCEDURE — 94660 CPAP INITIATION&MGMT: CPT

## 2021-04-23 PROCEDURE — 94640 AIRWAY INHALATION TREATMENT: CPT

## 2021-04-23 PROCEDURE — 2500000003 HC RX 250 WO HCPCS: Performed by: NURSE PRACTITIONER

## 2021-04-23 RX ORDER — WARFARIN SODIUM 10 MG/1
10 TABLET ORAL
Qty: 30 TABLET | Refills: 3 | Status: SHIPPED | OUTPATIENT
Start: 2021-04-26

## 2021-04-23 RX ORDER — PREDNISONE 10 MG/1
TABLET ORAL
Qty: 30 TABLET | Refills: 0 | Status: SHIPPED | OUTPATIENT
Start: 2021-04-23 | End: 2021-05-04 | Stop reason: ALTCHOICE

## 2021-04-23 RX ORDER — FERROUS SULFATE 325(65) MG
325 TABLET ORAL
Qty: 30 TABLET | Refills: 5 | Status: ON HOLD | OUTPATIENT
Start: 2021-04-23 | End: 2022-05-12 | Stop reason: HOSPADM

## 2021-04-23 RX ADMIN — SODIUM CHLORIDE 25 MG: 9 INJECTION, SOLUTION INTRAVENOUS at 13:11

## 2021-04-23 RX ADMIN — AMLODIPINE BESYLATE 2.5 MG: 2.5 TABLET ORAL at 08:59

## 2021-04-23 RX ADMIN — SODIUM CHLORIDE 100 MG: 9 INJECTION, SOLUTION INTRAVENOUS at 15:13

## 2021-04-23 RX ADMIN — ARFORMOTEROL TARTRATE 15 MCG: 15 SOLUTION RESPIRATORY (INHALATION) at 08:56

## 2021-04-23 RX ADMIN — IPRATROPIUM BROMIDE AND ALBUTEROL SULFATE 1 AMPULE: .5; 3 SOLUTION RESPIRATORY (INHALATION) at 12:37

## 2021-04-23 RX ADMIN — BUDESONIDE 500 MCG: 0.5 SUSPENSION RESPIRATORY (INHALATION) at 08:56

## 2021-04-23 RX ADMIN — LISINOPRIL 20 MG: 20 TABLET ORAL at 08:59

## 2021-04-23 RX ADMIN — DOXYCYCLINE 100 MG: 100 INJECTION, POWDER, LYOPHILIZED, FOR SOLUTION INTRAVENOUS at 09:00

## 2021-04-23 RX ADMIN — IPRATROPIUM BROMIDE AND ALBUTEROL SULFATE 1 AMPULE: .5; 3 SOLUTION RESPIRATORY (INHALATION) at 16:56

## 2021-04-23 RX ADMIN — SODIUM CHLORIDE, PRESERVATIVE FREE 10 ML: 5 INJECTION INTRAVENOUS at 09:00

## 2021-04-23 RX ADMIN — IPRATROPIUM BROMIDE AND ALBUTEROL SULFATE 1 AMPULE: .5; 3 SOLUTION RESPIRATORY (INHALATION) at 08:57

## 2021-04-23 RX ADMIN — WARFARIN SODIUM 5 MG: 5 TABLET ORAL at 16:07

## 2021-04-23 RX ADMIN — PANTOPRAZOLE SODIUM 40 MG: 40 TABLET, DELAYED RELEASE ORAL at 08:59

## 2021-04-23 RX ADMIN — METHYLPREDNISOLONE SODIUM SUCCINATE 40 MG: 40 INJECTION, POWDER, LYOPHILIZED, FOR SOLUTION INTRAMUSCULAR; INTRAVENOUS at 09:00

## 2021-04-23 ASSESSMENT — PAIN SCALES - GENERAL: PAINLEVEL_OUTOF10: 0

## 2021-04-23 NOTE — PROGRESS NOTES
Pulse ox was 91% on room air at rest.   Ambulated patient on room air. Oxygen saturation was 93% on room air while ambulating. Oxygen applied.    Recovery pulse ox was 94% on room air

## 2021-04-23 NOTE — CARE COORDINATION
4/23/2021  Social Work Discharge [address] is 16/24. Pt is on IV ATB. Await ATB plan. Referral was made to Arianne Rivera with [facility] [place]. [address] Waiitng reply. Pt doesn't want HHC unless its needed for ATB reasons. Pt is on 1.5 l o2 her and uses 3l via [facility].uses a cane at home. Plan is home to her daughters house. Electronically signed by AGUSTÍN Burgos on 4/23/2021 at 10:28 AM    4/23/2021  Social Work Discharge Planning: [facility]. HHC is unable to follow for possible ATB needs. SW made a referral to Ayesha Gallo at [facility]. Waiting reply. Electronically signed by AGUSTÍN Burgos on 4/23/2021 at 11:57 AM    4/23/2021  Social Work Discharge Planning:SW was informed by Ayesha Gallo with [facility] [phone] that they will follow Pt;however, cant run benefits until ID puts a note in of ATB plan.  Electronically signed by AGUSTÍN Burgos on 4/23/2021 at 1:38 PM

## 2021-04-23 NOTE — PROGRESS NOTES
Occupational Therapy  Patient treatment attempted this PM.  Patient sitting in the chair. States she is tired and declined activity at this time. Will attempt at a later time.   [place] FRANCO/HENRIETTA [id number]

## 2021-04-23 NOTE — PROGRESS NOTES
Patient wanted scripts transferred to [facility] in [place] (prednisone and ferrous sulfate)

## 2021-04-23 NOTE — DISCHARGE SUMMARY
Physician Discharge Summary     Patient ID:  Shonna Garces  [id number]  89 y.o.  1940    Admit date: 4/20/2021    Discharge date and time:  4/23/2021    Discharge Diagnoses: Active Problems:    COPD exacerbation (HCC)    FAISAL (obstructive sleep apnea)    Leukocytosis    Dyspnea and respiratory abnormalities    Essential hypertension    Tobacco abuse    History of pulmonary embolism    History of DVT (deep vein thrombosis)    Subtherapeutic international normalized ratio (INR)    Chronic anemia    Iron deficiency anemia    Thrombocytosis (HCC)  Resolved Problems:    * No resolved hospital problems. *      Consults: IP CONSULT TO HOSPITALIST  IP CONSULT TO SOCIAL WORK  IP CONSULT TO IV TEAM  IP CONSULT TO IV TEAM    Procedures: See below    Hospital Course:      51-year-old female history of COPD and VTE on Coumadin admitted with shortness of breath     Admit  IV steroids-->PO  Nebulizers  IV ABX   MRSA screen  BCx NGTD  UA negative, UCx + like colonization  proCalcitonin low, 0.12, 0.15  Tobacco cessation counseled  bipap hs  Leukocytosis appears chronic-check eileen smear  -Results reviewed  -Reviewed iron studies-consistent with iron deficiency  Start IV iron PO for DC  Medications for other co morbidities cont as appropriate w dosage adjustments as necessary  PT/OT  DVT PPx  DC planning  Home        Discharge Exam:  See progress note from today    Condition:  Stable    Disposition: home    Patient Instructions:   Current Discharge Medication List      START taking these medications    Details   predniSONE (DELTASONE) 10 MG tablet 4 POx3d, 3poX3d, 6she5rfvk, 2suo8iopw  Qty: 30 tablet, Refills: 0      ferrous sulfate (IRON 325) 325 (65 Fe) MG tablet Take 1 tablet by mouth daily (with breakfast)  Qty: 30 tablet, Refills: 5         CONTINUE these medications which have NOT CHANGED    Details   !! warfarin (COUMADIN) 7.5 MG tablet Take 7.5 mg by mouth Five times weekly Mon-Tue-Thur-FriFrancisSun @@ 2100      !!  warfarin (COUMADIN) 10 MG tablet Take 10 mg by mouth twice a week Wed and sundays @@ 2100. nitrofurantoin, macrocrystal-monohydrate, (MACROBID) 100 MG capsule Take 100 mg by mouth 2 times daily Started 4/19/21 for 10 days for uti      amLODIPine (NORVASC) 2.5 MG tablet Take 1 tablet by mouth daily  Qty: 30 tablet, Refills: 3      ipratropium-albuterol (DUONEB) 0.5-2.5 (3) MG/3ML SOLN nebulizer solution Inhale 3 mLs into the lungs every 6 hours as needed for Shortness of Breath  Qty: 360 mL, Refills: 0      lisinopril (PRINIVIL;ZESTRIL) 20 MG tablet Take 20 mg by mouth 2 times daily       pantoprazole (PROTONIX) 40 MG tablet Take 40 mg by mouth 2 times daily      umeclidinium-vilanterol (ANORO ELLIPTA) 62.5-25 MCG/INH AEPB inhaler Inhale 1 puff into the lungs daily      guaiFENesin 400 MG tablet Take 1 tablet by mouth 4 times daily as needed for Cough  Qty: 56 tablet, Refills: 0      Respiratory Therapy Supplies (FULL KIT NEBULIZER SET) MISC Use as directed with nebulized medication. Qty: 1 each, Refills: 0    Comments: Supply prescription to Pharmacy or [address] [place] [address] location of patient or coverage preference. Dispense full nebulizer kit - compressor, tubing, mouthpiece, mask, ancillary supplies - per requirements of ordered product, patient preference, or coverage allowances. !! - Potential duplicate medications found. Please discuss with provider.         Activity: activity as tolerated  Diet: cardiac diet    Follow-up with 1wk PCP, 2wks Pulm    Note that over 30 minutes was spent in preparing discharge papers, discussing discharge with patient, staff, consultants, medication review, arranging follow up, etc.    Signed:  Marlon Schaefer MD  4/23/2021  4:08 PM

## 2021-04-23 NOTE — PROGRESS NOTES
Subjective:  Feeling better trouble sleeping  No CP or SOB  No fever or chills   No uncontrolled pain  No vomiting or diarrhea     Objective:    BP (!) 143/71   Pulse 90   Temp 98 °F (36.7 °C) (Oral)   Resp 18   Ht 5' 6\" (1.676 m)   Wt 193 lb 12.8 oz (87.9 kg)   SpO2 98%   BMI 31.28 kg/m²     24HR INTAKE/OUTPUT:      Intake/Output Summary (Last 24 hours) at 4/23/2021 1132  Last data filed at 4/22/2021 1507  Gross per 24 hour   Intake 280 ml   Output 600 ml   Net -320 ml       General appearance: NAD, conversant  Neck: FROM, supple   Lungs: Clear bilaterally no wheezes, no rhonchi, no crackles  CV: RRR, no MRGs; normal carotid upstroke and amplitude without Bruits  Abdomen: Soft, non-tender; no masses or HSM  Extremities: No edema, no cyanosis, no clubbing  Skin: Intact no rash, no lesions, no ulcers    Psych: Alert and oriented normal affect  Neuro: Nonfocal  Most Recent Labs  Lab Results   Component Value Date    WBC 16.7 (H) 04/22/2021    HGB 10.1 (L) 04/22/2021    HCT 34.5 04/22/2021     (H) 04/22/2021     04/22/2021    K 4.2 04/22/2021     04/22/2021    CREATININE 0.8 04/22/2021    BUN 25 (H) 04/22/2021    CO2 28 04/22/2021    GLUCOSE 112 (H) 04/22/2021    ALT 7 04/20/2021    AST 12 04/20/2021    INR 1.8 04/23/2021    LABA1C 5.8 (H) 01/27/2021     No results for input(s): MG in the last 72 hours. Lab Results   Component Value Date    CALCIUM 9.5 04/22/2021        CTA PULMONARY W CONTRAST   Final Result   No evidence of pulmonary embolism. Right middle lobe atelectasis versus pneumonia. XR CHEST PORTABLE   Final Result   No acute process.              Assessment    Active Problems:    COPD exacerbation (HCC)    FAISAL (obstructive sleep apnea)    Leukocytosis    Dyspnea and respiratory abnormalities    Essential hypertension    Tobacco abuse    History of pulmonary embolism    History of DVT (deep vein thrombosis)    Subtherapeutic international normalized ratio (INR) Chronic anemia    Iron deficiency anemia    Thrombocytosis (HCC)  Resolved Problems:    * No resolved hospital problems.  *      Plan:     45-year-old female history of COPD and VTE on Coumadin admitted with shortness of breath     Admit  IV steroids-->PO  Nebulizers  IV ABX   MRSA screen  BCx NGTD  UA negative, UCx + like colonization  proCalcitonin low, 0.12, 0.15  Tobacco cessation counseled  bipap hs  Leukocytosis appears chronic-check eileen smear  -Results reviewed  -Reviewed iron studies-consistent with iron deficiency  Start IV iron PO for DC  Medications for other co morbidities cont as appropriate w dosage adjustments as necessary  PT/OT  DVT PPx  DC planning  Home    Electronically signed by Yuliet Ma MD on 4/23/2021 at 11:32 AM

## 2021-04-23 NOTE — PROGRESS NOTES
Physical Therapy  Facility/Department: [address] INTERNAL MEDICINE 2  Daily Treatment Note  NAME: Michell Palmer  : 1940  MRN: [id number]    Date of Service: 2021    Patient Diagnosis(es): The primary encounter diagnosis was COPD exacerbation ([facility] Diagnoses of Leukocytosis, unspecified type and Acute respiratory failure with hypoxia ([facility] were also pertinent to this visit. has a past medical history of COPD (chronic obstructive pulmonary disease) ([facility] [address] DVT (deep venous thrombosis) ([facility] [address] Hypertension, and Pulmonary embolism ([facility] [address] has a past surgical history that includes Cholecystectomy; Mastectomy, bilateral (); Carpal tunnel release (Right); and Hysterectomy. Evaluating Therapist: Maciej Miller, PT      Referring Provider:   Dr. Gwen Ruiz #:  [id number]  DIAGNOSIS: COPD exac  PRECAUTIONS: falls, decreased vision      Social:  Pt lives with daughter   in a  1  floor plan 2  steps and 1 rails to enter. Prior to admission pt walked with cane. Reports she furniture walks. Has rollator , home O2        Initial Evaluation  Date:  2021  Treatment  21    Short Term/ Long Term   Goals   Was pt agreeable to Eval/treatment? yes  With encouragement     Does pt have pain?  None reported  None reported      Bed Mobility  Rolling:  NT   Supine to sit:  NT   Sit to supine:  NT   Scooting:  SBA in sit   Pt sitting in chair upon arrival   supine to sit SBA  Scooting SBA  S/I    Transfers Sit to stand:  SBA   Stand to sit:  SBA   Stand pivot:  CGA   sit <> stand SBA S/I   Ambulation     15 and 50  feet with  Cane  with  CGA   15 feet with cane CGA  15 feet with  ww SBA  100  feet with AAD  with  S/I          Stair negotiation: ascended and descended NT   NT  2-4  steps with  1  rail with  SBA /CGA    LE ROM  WFL        LE strength  4- / 5        AM- PAC RAW score                Patient education  Pt was educated on importance of mobiliyt    Patient response to education:   Pt

## 2021-04-26 LAB
BLOOD CULTURE, ROUTINE: NORMAL
CULTURE, BLOOD 2: NORMAL

## 2021-05-04 ENCOUNTER — HOSPITAL ENCOUNTER (EMERGENCY)
Age: 81
Discharge: HOME | End: 2021-05-04
Attending: EMERGENCY MEDICINE
Payer: MEDICARE

## 2021-05-04 ENCOUNTER — APPOINTMENT (OUTPATIENT)
Dept: GENERAL RADIOLOGY | Age: 81
End: 2021-05-04
Payer: MEDICARE

## 2021-05-04 ENCOUNTER — TELEPHONE (OUTPATIENT)
Dept: ADMISSION | Age: 81
End: 2021-05-04

## 2021-05-04 VITALS
HEIGHT: 66 IN | OXYGEN SATURATION: 99 % | BODY MASS INDEX: 31.34 KG/M2 | TEMPERATURE: 98.8 F | HEART RATE: 88 BPM | WEIGHT: 195 LBS | DIASTOLIC BLOOD PRESSURE: 76 MMHG | RESPIRATION RATE: 14 BRPM | SYSTOLIC BLOOD PRESSURE: 169 MMHG

## 2021-05-04 DIAGNOSIS — J44.1 COPD EXACERBATION (HCC): Primary | ICD-10-CM

## 2021-05-04 DIAGNOSIS — Z72.0 TOBACCO ABUSE: ICD-10-CM

## 2021-05-04 LAB
ALBUMIN SERPL-MCNC: 3.9 G/DL (ref 3.5–5.2)
ALP BLD-CCNC: 106 U/L (ref 35–104)
ALT SERPL-CCNC: 10 U/L (ref 0–32)
ANION GAP SERPL CALCULATED.3IONS-SCNC: 12 MMOL/L (ref 7–16)
ANISOCYTOSIS: ABNORMAL
AST SERPL-CCNC: 13 U/L (ref 0–31)
BASOPHILS ABSOLUTE: 0.07 E9/L (ref 0–0.2)
BASOPHILS RELATIVE PERCENT: 0.4 % (ref 0–2)
BILIRUB SERPL-MCNC: <0.2 MG/DL (ref 0–1.2)
BUN BLDV-MCNC: 21 MG/DL (ref 6–23)
BURR CELLS: ABNORMAL
CALCIUM SERPL-MCNC: 8.8 MG/DL (ref 8.6–10.2)
CHLORIDE BLD-SCNC: 100 MMOL/L (ref 98–107)
CO2: 29 MMOL/L (ref 22–29)
CREAT SERPL-MCNC: 0.9 MG/DL (ref 0.5–1)
EKG ATRIAL RATE: 89 BPM
EKG P AXIS: 47 DEGREES
EKG P-R INTERVAL: 112 MS
EKG Q-T INTERVAL: 372 MS
EKG QRS DURATION: 80 MS
EKG QTC CALCULATION (BAZETT): 452 MS
EKG R AXIS: 50 DEGREES
EKG T AXIS: 80 DEGREES
EKG VENTRICULAR RATE: 89 BPM
EOSINOPHILS ABSOLUTE: 0.07 E9/L (ref 0.05–0.5)
EOSINOPHILS RELATIVE PERCENT: 0.4 % (ref 0–6)
GFR AFRICAN AMERICAN: >60
GFR NON-AFRICAN AMERICAN: >60 ML/MIN/1.73
GLUCOSE BLD-MCNC: 158 MG/DL (ref 74–99)
HCT VFR BLD CALC: 35.9 % (ref 34–48)
HEMOGLOBIN: 10.6 G/DL (ref 11.5–15.5)
IMMATURE GRANULOCYTES #: 0.23 E9/L
IMMATURE GRANULOCYTES %: 1.3 % (ref 0–5)
INR BLD: 3.4
LYMPHOCYTES ABSOLUTE: 3.45 E9/L (ref 1.5–4)
LYMPHOCYTES RELATIVE PERCENT: 19.3 % (ref 20–42)
MAGNESIUM: 1.8 MG/DL (ref 1.6–2.6)
MCH RBC QN AUTO: 23.1 PG (ref 26–35)
MCHC RBC AUTO-ENTMCNC: 29.5 % (ref 32–34.5)
MCV RBC AUTO: 78.4 FL (ref 80–99.9)
MONOCYTES ABSOLUTE: 0.85 E9/L (ref 0.1–0.95)
MONOCYTES RELATIVE PERCENT: 4.7 % (ref 2–12)
NEUTROPHILS ABSOLUTE: 13.23 E9/L (ref 1.8–7.3)
NEUTROPHILS RELATIVE PERCENT: 73.9 % (ref 43–80)
OVALOCYTES: ABNORMAL
PDW BLD-RTO: 21.5 FL (ref 11.5–15)
PLATELET # BLD: 548 E9/L (ref 130–450)
PMV BLD AUTO: 9.4 FL (ref 7–12)
POIKILOCYTES: ABNORMAL
POLYCHROMASIA: ABNORMAL
POTASSIUM REFLEX MAGNESIUM: 3.3 MMOL/L (ref 3.5–5)
PROTHROMBIN TIME: 37.1 SEC (ref 9.3–12.4)
RBC # BLD: 4.58 E12/L (ref 3.5–5.5)
SODIUM BLD-SCNC: 141 MMOL/L (ref 132–146)
TOTAL PROTEIN: 6.9 G/DL (ref 6.4–8.3)
TROPONIN: <0.01 NG/ML (ref 0–0.03)
WBC # BLD: 17.9 E9/L (ref 4.5–11.5)

## 2021-05-04 PROCEDURE — 94664 DEMO&/EVAL PT USE INHALER: CPT

## 2021-05-04 PROCEDURE — 6370000000 HC RX 637 (ALT 250 FOR IP): Performed by: STUDENT IN AN ORGANIZED HEALTH CARE EDUCATION/TRAINING PROGRAM

## 2021-05-04 PROCEDURE — 80053 COMPREHEN METABOLIC PANEL: CPT

## 2021-05-04 PROCEDURE — 84484 ASSAY OF TROPONIN QUANT: CPT

## 2021-05-04 PROCEDURE — 93010 ELECTROCARDIOGRAM REPORT: CPT | Performed by: INTERNAL MEDICINE

## 2021-05-04 PROCEDURE — 71045 X-RAY EXAM CHEST 1 VIEW: CPT

## 2021-05-04 PROCEDURE — 93005 ELECTROCARDIOGRAM TRACING: CPT | Performed by: STUDENT IN AN ORGANIZED HEALTH CARE EDUCATION/TRAINING PROGRAM

## 2021-05-04 PROCEDURE — 83735 ASSAY OF MAGNESIUM: CPT

## 2021-05-04 PROCEDURE — 99285 EMERGENCY DEPT VISIT HI MDM: CPT

## 2021-05-04 PROCEDURE — 85025 COMPLETE CBC W/AUTO DIFF WBC: CPT

## 2021-05-04 PROCEDURE — 85610 PROTHROMBIN TIME: CPT

## 2021-05-04 RX ORDER — IPRATROPIUM BROMIDE AND ALBUTEROL SULFATE 2.5; .5 MG/3ML; MG/3ML
1 SOLUTION RESPIRATORY (INHALATION) ONCE
Status: COMPLETED | OUTPATIENT
Start: 2021-05-04 | End: 2021-05-04

## 2021-05-04 RX ORDER — POTASSIUM CHLORIDE 20 MEQ/1
40 TABLET, EXTENDED RELEASE ORAL ONCE
Status: COMPLETED | OUTPATIENT
Start: 2021-05-04 | End: 2021-05-04

## 2021-05-04 RX ORDER — PREDNISONE 50 MG/1
50 TABLET ORAL DAILY
Qty: 5 TABLET | Refills: 0 | Status: SHIPPED | OUTPATIENT
Start: 2021-05-04 | End: 2021-05-09

## 2021-05-04 RX ADMIN — IPRATROPIUM BROMIDE AND ALBUTEROL SULFATE 1 AMPULE: .5; 3 SOLUTION RESPIRATORY (INHALATION) at 01:53

## 2021-05-04 RX ADMIN — POTASSIUM CHLORIDE 40 MEQ: 1500 TABLET, EXTENDED RELEASE ORAL at 02:39

## 2021-05-04 ASSESSMENT — ENCOUNTER SYMPTOMS
NAUSEA: 0
WHEEZING: 0
SHORTNESS OF BREATH: 1
DIARRHEA: 0
COUGH: 0
BACK PAIN: 0
VOMITING: 0
ABDOMINAL DISTENTION: 0

## 2021-05-04 NOTE — ED PROVIDER NOTES
Exam  Vitals signs and nursing note reviewed. Constitutional:       General: She is not in acute distress. Appearance: Normal appearance. HENT:      Head: Normocephalic and atraumatic. Nose: No congestion or rhinorrhea. Mouth/Throat:      Mouth: Mucous membranes are moist.      Pharynx: Oropharynx is clear. Eyes:      Extraocular Movements: Extraocular movements intact. Pupils: Pupils are equal, round, and reactive to light. Neck:      Musculoskeletal: Normal range of motion. No neck rigidity or muscular tenderness. Cardiovascular:      Rate and Rhythm: Normal rate and regular rhythm. Heart sounds: No murmur. No gallop. Pulmonary:      Effort: Pulmonary effort is normal. No respiratory distress. Breath sounds: Wheezing (Minimal) present. No rhonchi or rales. Abdominal:      General: Abdomen is flat. Palpations: Abdomen is soft. There is no mass. Tenderness: There is no abdominal tenderness. There is no guarding. Hernia: No hernia is present. Musculoskeletal: Normal range of motion. General: No swelling, tenderness or signs of injury. Skin:     General: Skin is warm and dry. Capillary Refill: Capillary refill takes less than 2 seconds. Neurological:      General: No focal deficit present. Mental Status: She is alert and oriented to person, place, and time. Mental status is at baseline. Psychiatric:         Mood and Affect: Mood normal.          Procedures       MDM  Number of Diagnoses or Management Options  COPD exacerbation ([facility] [address]  Tobacco abuse  Diagnosis management comments: Patient is an 60-year-old female, past medical history of COPD, DVT, hypertension and PE anticoagulated on warfarin. Patient presents with chief complaint of shortness of breath. Vital signs stable on presentation.   On physical exam heart regular rate and rhythm, lungs with very minimal wheezing bilaterally mostly clear to auscultation, abdomen soft nontender. EKG obtained demonstrate no acute ischemic changes. Laboratory work obtained CBC demonstrated chronic cytosis of 17.9 as well as chronic anemia hemoglobin of 10.6, CMP demonstrated mild hypokalemia of 3.3, magnesium 1.8, troponin less than 0.01 INR 3.4. Chest x-rays obtained demonstrate no acute abnormalities. Patient given 1 DuoNeb prior to arrival as well as Solu-Medrol, patient given additional DuoNeb while in the department as well as 40 mEq of KCl. Patient initially ambulated in the department without home O2 with a minimal drop in oxygen saturation, patient placed on her 2 L of nasal cannula patient sat at 99% and was in no acute respiratory distress. Findings consistent with COPD exacerbation. Decision made to discharge patient. Patient was counseled at length concerning tobacco abuse in the setting of COPD. In addition patient was given prescription for prednisone. Patient was also instructed to follow-up with primary care doctor soon as possible. In addition if patient notes any new worrisome symptoms she should return to the emergency department for evaluation. Plan of care discussed with patient including discharge, all questions were answered, patient was in agreement plan of care and discharged home in stable condition.        Amount and/or Complexity of Data Reviewed  Clinical lab tests: ordered and reviewed  Tests in the radiology section of CPT®: ordered and reviewed  Decide to obtain previous medical records or to obtain history from someone other than the patient: yes    Risk of Complications, Morbidity, and/or Mortality  Presenting problems: moderate  Diagnostic procedures: moderate  Management options: moderate    Patient Progress  Patient progress: stable             --------------------------------------------- PAST HISTORY ---------------------------------------------  Past Medical History:  has a past medical history of COPD (chronic obstructive [facility] disease) [facility] Potassium reflex Magnesium 3.3 (L) 3.5 - 5.0 mmol/L    Chloride 100 98 - 107 mmol/L    CO2 29 22 - 29 mmol/L    Anion Gap 12 7 - 16 mmol/L    Glucose 158 (H) 74 - 99 mg/dL    BUN 21 6 - 23 mg/dL    CREATININE 0.9 0.5 - 1.0 mg/dL    GFR Non-African American >60 >=60 mL/min/1.73    GFR African American >60     Calcium 8.8 8.6 - 10.2 mg/dL    Total Protein 6.9 6.4 - 8.3 g/dL    Albumin 3.9 3.5 - 5.2 g/dL    Total Bilirubin <0.2 0.0 - 1.2 mg/dL    Alkaline Phosphatase 106 (H) 35 - 104 U/L    ALT 10 0 - 32 U/L    AST 13 0 - 31 U/L   Troponin   Result Value Ref Range    Troponin <0.01 0.00 - 0.03 ng/mL   Protime-INR   Result Value Ref Range    Protime 37.1 (H) 9.3 - 12.4 sec    INR 3.4    Magnesium   Result Value Ref Range    Magnesium 1.8 1.6 - 2.6 mg/dL   EKG 12 Lead   Result Value Ref Range    Ventricular Rate 89 BPM    Atrial Rate 89 BPM    P-R Interval 112 ms    QRS Duration 80 ms    Q-T Interval 372 ms    QTc Calculation (Bazett) 452 ms    P Axis 47 degrees    R Axis 50 degrees    T Axis 80 degrees       Radiology:  XR CHEST PORTABLE   Final Result   Chronic appearing findings as detailed above. PA and lateral views would be   useful for further assessment, if symptoms persist.             ------------------------- NURSING NOTES AND VITALS REVIEWED ---------------------------  Date / Time Roomed:  5/4/2021  1:08 AM  ED Bed Assignment:  16/16    The nursing notes within the ED encounter and vital signs as below have been reviewed. BP (!) 169/76   Pulse 88   Temp 98.8 °F (37.1 °C) (Oral)   Resp 14   Ht 5' 6\" (1.676 m)   Wt 195 lb (88.5 kg)   SpO2 99%   BMI 31.47 kg/m²   Oxygen Saturation Interpretation: Normal and patient was ambulated around the department without her home O2 thus accounting for drop in oxygen saturation.   However patient placed on her home 2 L nasal cannula satting at 99% throughout the ED stay.      ------------------------------------------ PROGRESS NOTES ------------------------------------------  6:10 AM EDT  I have spoken with the patient and discussed todays results, in addition to providing specific details for the plan of care and counseling regarding the diagnosis and prognosis. Their questions are answered at this time and they are agreeable with the plan. I discussed at length with them reasons for immediate return here for re evaluation. They will followup with their primary care physician by calling their office tomorrow. --------------------------------- ADDITIONAL PROVIDER NOTES ---------------------------------  At this time the patient is without objective evidence of an acute process requiring hospitalization or inpatient management. They have remained hemodynamically stable throughout their entire ED visit and are stable for discharge with outpatient follow-up. The plan has been discussed in detail and they are aware of the specific conditions for emergent return, as well as the importance of follow-up. Discharge Medication List as of 5/4/2021  5:30 AM      START taking these medications    Details   predniSONE (DELTASONE) 50 MG tablet Take 1 tablet by mouth daily for 5 days, Disp-5 tablet, R-0Print             Diagnosis:  1. COPD exacerbation ([facility]    2. Tobacco abuse        Disposition:  Patient's disposition: Discharge to home  Patient's condition is stable. Patient was seen and evaluated by myself and my attending No att. providers found. Assessment and Plan discussed with attending provider, please see attestation for final plan of care.      DO Randi Pate DO  Resident  05/04/21 9975

## 2021-05-04 NOTE — ED NOTES
Bed: 16  Expected date:   Expected time:   Means of arrival:   Comments:  EMS     Cabrera Swenson RN  53/84/10 [id number]

## 2021-05-04 NOTE — ED NOTES
In bed, PO2 92% HR 81. Patient ambulated with walker for 60 feet. PO2 88% HR 93. Placed on 2 liters of O2.  Patient 99%     Medora Lamp, LPN  73/85/17 7585

## 2021-10-22 ENCOUNTER — APPOINTMENT (OUTPATIENT)
Dept: GENERAL RADIOLOGY | Age: 81
DRG: 871 | End: 2021-10-22
Payer: MEDICARE

## 2021-10-22 LAB
ALBUMIN SERPL-MCNC: 4.2 G/DL (ref 3.5–5.2)
ALP BLD-CCNC: 142 U/L (ref 35–104)
ALT SERPL-CCNC: 7 U/L (ref 0–32)
ANION GAP SERPL CALCULATED.3IONS-SCNC: 13 MMOL/L (ref 7–16)
AST SERPL-CCNC: 12 U/L (ref 0–31)
BASOPHILS ABSOLUTE: 0.07 E9/L (ref 0–0.2)
BASOPHILS RELATIVE PERCENT: 0.4 % (ref 0–2)
BILIRUB SERPL-MCNC: 0.8 MG/DL (ref 0–1.2)
BUN BLDV-MCNC: 13 MG/DL (ref 6–23)
CALCIUM SERPL-MCNC: 9.6 MG/DL (ref 8.6–10.2)
CHLORIDE BLD-SCNC: 100 MMOL/L (ref 98–107)
CO2: 30 MMOL/L (ref 22–29)
CREAT SERPL-MCNC: 0.9 MG/DL (ref 0.5–1)
EOSINOPHILS ABSOLUTE: 0.18 E9/L (ref 0.05–0.5)
EOSINOPHILS RELATIVE PERCENT: 1 % (ref 0–6)
GFR AFRICAN AMERICAN: >60
GFR NON-AFRICAN AMERICAN: >60 ML/MIN/1.73
GLUCOSE BLD-MCNC: 129 MG/DL (ref 74–99)
HCT VFR BLD CALC: 47.2 % (ref 34–48)
HEMOGLOBIN: 15.2 G/DL (ref 11.5–15.5)
IMMATURE GRANULOCYTES #: 0.08 E9/L
IMMATURE GRANULOCYTES %: 0.5 % (ref 0–5)
LACTIC ACID: 1.6 MMOL/L (ref 0.5–2.2)
LYMPHOCYTES ABSOLUTE: 1.26 E9/L (ref 1.5–4)
LYMPHOCYTES RELATIVE PERCENT: 7.3 % (ref 20–42)
MAGNESIUM: 1.8 MG/DL (ref 1.6–2.6)
MCH RBC QN AUTO: 29.3 PG (ref 26–35)
MCHC RBC AUTO-ENTMCNC: 32.2 % (ref 32–34.5)
MCV RBC AUTO: 90.9 FL (ref 80–99.9)
MONOCYTES ABSOLUTE: 1.09 E9/L (ref 0.1–0.95)
MONOCYTES RELATIVE PERCENT: 6.3 % (ref 2–12)
NEUTROPHILS ABSOLUTE: 14.61 E9/L (ref 1.8–7.3)
NEUTROPHILS RELATIVE PERCENT: 84.5 % (ref 43–80)
PDW BLD-RTO: 14.3 FL (ref 11.5–15)
PLATELET # BLD: 443 E9/L (ref 130–450)
PMV BLD AUTO: 10 FL (ref 7–12)
POTASSIUM REFLEX MAGNESIUM: 3.3 MMOL/L (ref 3.5–5)
PRO-BNP: 823 PG/ML (ref 0–450)
RBC # BLD: 5.19 E12/L (ref 3.5–5.5)
SODIUM BLD-SCNC: 143 MMOL/L (ref 132–146)
TOTAL PROTEIN: 7.8 G/DL (ref 6.4–8.3)
TROPONIN, HIGH SENSITIVITY: 19 NG/L (ref 0–9)
WBC # BLD: 17.3 E9/L (ref 4.5–11.5)

## 2021-10-22 PROCEDURE — U0003 INFECTIOUS AGENT DETECTION BY NUCLEIC ACID (DNA OR RNA); SEVERE ACUTE RESPIRATORY SYNDROME CORONAVIRUS 2 (SARS-COV-2) (CORONAVIRUS DISEASE [COVID-19]), AMPLIFIED PROBE TECHNIQUE, MAKING USE OF HIGH THROUGHPUT TECHNOLOGIES AS DESCRIBED BY CMS-2020-01-R: HCPCS

## 2021-10-22 PROCEDURE — U0005 INFEC AGEN DETEC AMPLI PROBE: HCPCS

## 2021-10-22 PROCEDURE — 85025 COMPLETE CBC W/AUTO DIFF WBC: CPT

## 2021-10-22 PROCEDURE — 80053 COMPREHEN METABOLIC PANEL: CPT

## 2021-10-22 PROCEDURE — 83880 ASSAY OF NATRIURETIC PEPTIDE: CPT

## 2021-10-22 PROCEDURE — 83735 ASSAY OF MAGNESIUM: CPT

## 2021-10-22 PROCEDURE — 83605 ASSAY OF LACTIC ACID: CPT

## 2021-10-22 PROCEDURE — 84484 ASSAY OF TROPONIN QUANT: CPT

## 2021-10-22 PROCEDURE — 71046 X-RAY EXAM CHEST 2 VIEWS: CPT

## 2021-10-22 PROCEDURE — 93005 ELECTROCARDIOGRAM TRACING: CPT | Performed by: PHYSICIAN ASSISTANT

## 2021-10-22 NOTE — ED PROVIDER NOTES
FIRST PROVIDER CONTACT ASSESSMENT NOTE      Department of Emergency Medicine   10/22/21  5:28 PM EDT    Chief Complaint: Shortness of Breath (Worse the last few days, pt has COPD and intermittently wears home O2, on arrival RA pulse ox 94% hr 115)      History of Present Illness:   Anupam Rizo is a [de-identified] y.o. female who presents to the ED for shortness of breath. States that she is always shortness of breath but got worse last night. Notes that she has COPD. States she has been tested for Covid but they have been negative. She is vaccinated against Covid. Denies any chest pain. Denies any other symptoms. No fever or cough. Medical History:  has a past medical history of COPD (chronic obstructive pulmonary disease) ([facility] [address] [address] DVT (deep venous thrombosis) ([facility] [address] Hypertension, and Pulmonary embolism ([facility] [address] Surgical History:  has a past surgical history that includes Cholecystectomy; Mastectomy, bilateral (1988); Carpal tunnel release (Right); and Hysterectomy. Social History:  reports that she has been smoking cigarettes. She started smoking about 68 years ago. She has a 16.25 pack-year smoking history. She has never used smokeless tobacco. She reports previous alcohol use. She reports that she does not use drugs. Family History: family history includes Heart Attack in her mother; Other in her brother. *ALLERGIES*     Carafate [sucralfate]     Physical Exam:      VS:  BP (!) 177/78   Pulse 115   Temp 99.1 °F (37.3 °C) (Oral)   Resp 18   Ht 5' 5\" (1.651 m)   Wt 195 lb (88.5 kg)   SpO2 95%   BMI 32.45 kg/m²      Initial Plan of Care:  Initiate Treatment-Testing, Proceed toTreatment Area When Bed Available for ED Attending/MLP to Continue Care    Not in respiratory distress    No bed for patient at this time. Told her to let us know for any new or worsening symptoms as she sits in the waiting room.     -----------------END OF FIRST PROVIDER CONTACT ASSESSMENT NOTE--------------  Electronically signed by Paddy Herrera PA-C   DD: 10/22/21             Nayeli Caraballo PA-C  10/22/21 8089

## 2021-10-23 ENCOUNTER — HOSPITAL ENCOUNTER (EMERGENCY)
Age: 81
Discharge: HOME | DRG: 871 | End: 2021-10-23
Attending: EMERGENCY MEDICINE
Payer: MEDICARE

## 2021-10-23 VITALS
BODY MASS INDEX: 32.49 KG/M2 | HEIGHT: 65 IN | SYSTOLIC BLOOD PRESSURE: 154 MMHG | HEART RATE: 99 BPM | DIASTOLIC BLOOD PRESSURE: 76 MMHG | WEIGHT: 195 LBS | OXYGEN SATURATION: 94 % | TEMPERATURE: 98.4 F | RESPIRATION RATE: 20 BRPM

## 2021-10-23 DIAGNOSIS — J44.1 COPD EXACERBATION (HCC): Primary | ICD-10-CM

## 2021-10-23 LAB
EKG ATRIAL RATE: 102 BPM
EKG P AXIS: 66 DEGREES
EKG P-R INTERVAL: 116 MS
EKG Q-T INTERVAL: 398 MS
EKG QRS DURATION: 82 MS
EKG QTC CALCULATION (BAZETT): 518 MS
EKG R AXIS: 76 DEGREES
EKG T AXIS: 73 DEGREES
EKG VENTRICULAR RATE: 102 BPM
SARS-COV-2, PCR: NOT DETECTED

## 2021-10-23 PROCEDURE — 6370000000 HC RX 637 (ALT 250 FOR IP): Performed by: EMERGENCY MEDICINE

## 2021-10-23 PROCEDURE — 94664 DEMO&/EVAL PT USE INHALER: CPT

## 2021-10-23 RX ORDER — IPRATROPIUM BROMIDE AND ALBUTEROL SULFATE 2.5; .5 MG/3ML; MG/3ML
1 SOLUTION RESPIRATORY (INHALATION) ONCE
Status: COMPLETED | OUTPATIENT
Start: 2021-10-23 | End: 2021-10-23

## 2021-10-23 RX ORDER — PREDNISONE 20 MG/1
60 TABLET ORAL ONCE
Status: COMPLETED | OUTPATIENT
Start: 2021-10-23 | End: 2021-10-23

## 2021-10-23 RX ORDER — PREDNISONE 20 MG/1
20 TABLET ORAL 2 TIMES DAILY
Qty: 10 TABLET | Refills: 0 | Status: ON HOLD | OUTPATIENT
Start: 2021-10-23 | End: 2021-11-01

## 2021-10-23 RX ADMIN — IPRATROPIUM BROMIDE AND ALBUTEROL SULFATE 1 AMPULE: .5; 3 SOLUTION RESPIRATORY (INHALATION) at 02:47

## 2021-10-23 RX ADMIN — PREDNISONE 60 MG: 20 TABLET ORAL at 04:27

## 2021-10-23 NOTE — ED NOTES
Patient assisted to restroom via wheelchair. Short of breath with minimal exertion. Wheezing noted. Placed on 2 liters 02 via nasal cannula as at home.      Sangeeta Robertson RN  10/23/21 3046

## 2021-10-25 ENCOUNTER — APPOINTMENT (OUTPATIENT)
Dept: GENERAL RADIOLOGY | Age: 81
DRG: 871 | End: 2021-10-25
Payer: MEDICARE

## 2021-10-25 ENCOUNTER — HOSPITAL ENCOUNTER (INPATIENT)
Age: 81
LOS: 10 days | Discharge: HOME | DRG: 871 | End: 2021-11-04
Attending: EMERGENCY MEDICINE | Admitting: INTERNAL MEDICINE
Payer: MEDICARE

## 2021-10-25 DIAGNOSIS — E87.6 HYPOKALEMIA: ICD-10-CM

## 2021-10-25 DIAGNOSIS — J18.9 PNEUMONIA OF LEFT LOWER LOBE DUE TO INFECTIOUS ORGANISM: Primary | ICD-10-CM

## 2021-10-25 LAB
ALBUMIN SERPL-MCNC: 4 G/DL (ref 3.5–5.2)
ALP BLD-CCNC: 127 U/L (ref 35–104)
ALT SERPL-CCNC: 8 U/L (ref 0–32)
ANION GAP SERPL CALCULATED.3IONS-SCNC: 11 MMOL/L (ref 7–16)
APTT: 28.6 SEC (ref 24.5–35.1)
AST SERPL-CCNC: 10 U/L (ref 0–31)
B.E.: -2.2 MMOL/L (ref -3–3)
BACTERIA: ABNORMAL /HPF
BASOPHILS ABSOLUTE: 0.03 E9/L (ref 0–0.2)
BASOPHILS RELATIVE PERCENT: 0.2 % (ref 0–2)
BILIRUB SERPL-MCNC: 0.3 MG/DL (ref 0–1.2)
BILIRUBIN URINE: NEGATIVE
BLOOD, URINE: NORMAL
BUN BLDV-MCNC: 16 MG/DL (ref 6–23)
CALCIUM SERPL-MCNC: 9.6 MG/DL (ref 8.6–10.2)
CHLORIDE BLD-SCNC: 102 MMOL/L (ref 98–107)
CLARITY: CLEAR
CO2: 30 MMOL/L (ref 22–29)
COHB: 0.8 % (ref 0–1.5)
COLOR: YELLOW
CREAT SERPL-MCNC: 0.8 MG/DL (ref 0.5–1)
CRITICAL: ABNORMAL
DATE ANALYZED: ABNORMAL
DATE OF COLLECTION: ABNORMAL
EKG ATRIAL RATE: 90 BPM
EKG P AXIS: 63 DEGREES
EKG P-R INTERVAL: 120 MS
EKG Q-T INTERVAL: 384 MS
EKG QRS DURATION: 90 MS
EKG QTC CALCULATION (BAZETT): 469 MS
EKG R AXIS: 75 DEGREES
EKG T AXIS: 79 DEGREES
EKG VENTRICULAR RATE: 90 BPM
EOSINOPHILS ABSOLUTE: 0.01 E9/L (ref 0.05–0.5)
EOSINOPHILS RELATIVE PERCENT: 0.1 % (ref 0–6)
EPITHELIAL CELLS, UA: ABNORMAL /HPF
GFR AFRICAN AMERICAN: >60
GFR NON-AFRICAN AMERICAN: >60 ML/MIN/1.73
GLUCOSE BLD-MCNC: 142 MG/DL (ref 74–99)
GLUCOSE URINE: NEGATIVE MG/DL
HCO3: 32.3 MMOL/L (ref 22–26)
HCT VFR BLD CALC: 43.9 % (ref 34–48)
HEMOGLOBIN: 13.7 G/DL (ref 11.5–15.5)
HHB: 1.2 % (ref 0–5)
IMMATURE GRANULOCYTES #: 0.09 E9/L
IMMATURE GRANULOCYTES %: 0.7 % (ref 0–5)
INR BLD: 1.8
KETONES, URINE: NEGATIVE MG/DL
LAB: ABNORMAL
LACTIC ACID, SEPSIS: 1.4 MMOL/L (ref 0.5–1.9)
LACTIC ACID, SEPSIS: 2.2 MMOL/L (ref 0.5–1.9)
LEUKOCYTE ESTERASE, URINE: NEGATIVE
LYMPHOCYTES ABSOLUTE: 0.69 E9/L (ref 1.5–4)
LYMPHOCYTES RELATIVE PERCENT: 5.4 % (ref 20–42)
Lab: ABNORMAL
MAGNESIUM: 1.8 MG/DL (ref 1.6–2.6)
MCH RBC QN AUTO: 29.1 PG (ref 26–35)
MCHC RBC AUTO-ENTMCNC: 31.2 % (ref 32–34.5)
MCV RBC AUTO: 93.4 FL (ref 80–99.9)
METER GLUCOSE: 161 MG/DL (ref 74–99)
METHB: 0.3 % (ref 0–1.5)
MODE: ABNORMAL
MONOCYTES ABSOLUTE: 0.49 E9/L (ref 0.1–0.95)
MONOCYTES RELATIVE PERCENT: 3.9 % (ref 2–12)
NEUTROPHILS ABSOLUTE: 11.37 E9/L (ref 1.8–7.3)
NEUTROPHILS RELATIVE PERCENT: 89.7 % (ref 43–80)
NITRITE, URINE: NEGATIVE
O2 CONTENT: 21.9 ML/DL
O2 SATURATION: 98.8 % (ref 92–98.5)
O2HB: 97.7 % (ref 94–97)
OPERATOR ID: 7221
PATIENT TEMP: 37 C
PCO2: 117.4 MMHG (ref 35–45)
PDW BLD-RTO: 14 FL (ref 11.5–15)
PH BLOOD GAS: 7.06 (ref 7.35–7.45)
PH UA: 6 (ref 5–9)
PLATELET # BLD: 428 E9/L (ref 130–450)
PMV BLD AUTO: 10.3 FL (ref 7–12)
PO2: 191.9 MMHG (ref 75–100)
POTASSIUM SERPL-SCNC: 3.2 MMOL/L (ref 3.5–5)
PRO-BNP: 1110 PG/ML (ref 0–450)
PROTEIN UA: NORMAL MG/DL
PROTHROMBIN TIME: 19.2 SEC (ref 9.3–12.4)
RBC # BLD: 4.7 E12/L (ref 3.5–5.5)
RBC UA: ABNORMAL /HPF (ref 0–2)
SODIUM BLD-SCNC: 143 MMOL/L (ref 132–146)
SOURCE, BLOOD GAS: ABNORMAL
SPECIFIC GRAVITY UA: 1.02 (ref 1–1.03)
THB: 15.7 G/DL (ref 11.5–16.5)
TIME ANALYZED: 2221
TOTAL PROTEIN: 7.5 G/DL (ref 6.4–8.3)
TROPONIN, HIGH SENSITIVITY: 16 NG/L (ref 0–9)
UROBILINOGEN, URINE: 0.2 E.U./DL
WBC # BLD: 12.7 E9/L (ref 4.5–11.5)
WBC UA: ABNORMAL /HPF (ref 0–5)

## 2021-10-25 PROCEDURE — 96375 TX/PRO/DX INJ NEW DRUG ADDON: CPT

## 2021-10-25 PROCEDURE — 93005 ELECTROCARDIOGRAM TRACING: CPT | Performed by: NURSE PRACTITIONER

## 2021-10-25 PROCEDURE — 74018 RADEX ABDOMEN 1 VIEW: CPT

## 2021-10-25 PROCEDURE — 36415 COLL VENOUS BLD VENIPUNCTURE: CPT

## 2021-10-25 PROCEDURE — 83605 ASSAY OF LACTIC ACID: CPT

## 2021-10-25 PROCEDURE — 36600 WITHDRAWAL OF ARTERIAL BLOOD: CPT

## 2021-10-25 PROCEDURE — 6360000002 HC RX W HCPCS

## 2021-10-25 PROCEDURE — 6360000002 HC RX W HCPCS: Performed by: EMERGENCY MEDICINE

## 2021-10-25 PROCEDURE — 81001 URINALYSIS AUTO W/SCOPE: CPT

## 2021-10-25 PROCEDURE — 94664 DEMO&/EVAL PT USE INHALER: CPT

## 2021-10-25 PROCEDURE — 6370000000 HC RX 637 (ALT 250 FOR IP): Performed by: PHYSICIAN ASSISTANT

## 2021-10-25 PROCEDURE — 0202U NFCT DS 22 TRGT SARS-COV-2: CPT

## 2021-10-25 PROCEDURE — 84484 ASSAY OF TROPONIN QUANT: CPT

## 2021-10-25 PROCEDURE — 99283 EMERGENCY DEPT VISIT LOW MDM: CPT

## 2021-10-25 PROCEDURE — 6360000002 HC RX W HCPCS: Performed by: INTERNAL MEDICINE

## 2021-10-25 PROCEDURE — 84439 ASSAY OF FREE THYROXINE: CPT

## 2021-10-25 PROCEDURE — 80053 COMPREHEN METABOLIC PANEL: CPT

## 2021-10-25 PROCEDURE — 2580000003 HC RX 258: Performed by: EMERGENCY MEDICINE

## 2021-10-25 PROCEDURE — 2500000003 HC RX 250 WO HCPCS: Performed by: EMERGENCY MEDICINE

## 2021-10-25 PROCEDURE — 99291 CRITICAL CARE FIRST HOUR: CPT | Performed by: INTERNAL MEDICINE

## 2021-10-25 PROCEDURE — 6360000002 HC RX W HCPCS: Performed by: PHYSICIAN ASSISTANT

## 2021-10-25 PROCEDURE — 85025 COMPLETE CBC W/AUTO DIFF WBC: CPT

## 2021-10-25 PROCEDURE — 85730 THROMBOPLASTIN TIME PARTIAL: CPT

## 2021-10-25 PROCEDURE — 82962 GLUCOSE BLOOD TEST: CPT

## 2021-10-25 PROCEDURE — 2000000000 HC ICU R&B

## 2021-10-25 PROCEDURE — 96365 THER/PROPH/DIAG IV INF INIT: CPT

## 2021-10-25 PROCEDURE — 5A1945Z RESPIRATORY VENTILATION, 24-96 CONSECUTIVE HOURS: ICD-10-PCS | Performed by: INTERNAL MEDICINE

## 2021-10-25 PROCEDURE — 85610 PROTHROMBIN TIME: CPT

## 2021-10-25 PROCEDURE — 84145 PROCALCITONIN (PCT): CPT

## 2021-10-25 PROCEDURE — 94002 VENT MGMT INPAT INIT DAY: CPT

## 2021-10-25 PROCEDURE — 87081 CULTURE SCREEN ONLY: CPT

## 2021-10-25 PROCEDURE — 31500 INSERT EMERGENCY AIRWAY: CPT

## 2021-10-25 PROCEDURE — 86140 C-REACTIVE PROTEIN: CPT

## 2021-10-25 PROCEDURE — 83735 ASSAY OF MAGNESIUM: CPT

## 2021-10-25 PROCEDURE — 2700000000 HC OXYGEN THERAPY PER DAY

## 2021-10-25 PROCEDURE — 71045 X-RAY EXAM CHEST 1 VIEW: CPT

## 2021-10-25 PROCEDURE — 0BH18EZ INSERTION OF ENDOTRACHEAL AIRWAY INTO TRACHEA, VIA NATURAL OR ARTIFICIAL OPENING ENDOSCOPIC: ICD-10-PCS | Performed by: INTERNAL MEDICINE

## 2021-10-25 PROCEDURE — 82805 BLOOD GASES W/O2 SATURATION: CPT

## 2021-10-25 PROCEDURE — 71046 X-RAY EXAM CHEST 2 VIEWS: CPT

## 2021-10-25 PROCEDURE — 83880 ASSAY OF NATRIURETIC PEPTIDE: CPT

## 2021-10-25 PROCEDURE — 6370000000 HC RX 637 (ALT 250 FOR IP): Performed by: EMERGENCY MEDICINE

## 2021-10-25 PROCEDURE — 87040 BLOOD CULTURE FOR BACTERIA: CPT

## 2021-10-25 PROCEDURE — 94640 AIRWAY INHALATION TREATMENT: CPT

## 2021-10-25 PROCEDURE — 2580000003 HC RX 258: Performed by: PHYSICIAN ASSISTANT

## 2021-10-25 PROCEDURE — 2500000003 HC RX 250 WO HCPCS: Performed by: INTERNAL MEDICINE

## 2021-10-25 RX ORDER — IPRATROPIUM BROMIDE AND ALBUTEROL SULFATE 2.5; .5 MG/3ML; MG/3ML
1 SOLUTION RESPIRATORY (INHALATION)
Status: DISCONTINUED | OUTPATIENT
Start: 2021-10-25 | End: 2021-10-25 | Stop reason: SDUPTHER

## 2021-10-25 RX ORDER — ACETAMINOPHEN 325 MG/1
650 TABLET ORAL EVERY 6 HOURS PRN
Status: DISCONTINUED | OUTPATIENT
Start: 2021-10-25 | End: 2021-11-04 | Stop reason: HOSPADM

## 2021-10-25 RX ORDER — PREDNISONE 20 MG/1
20 TABLET ORAL 2 TIMES DAILY
Status: DISCONTINUED | OUTPATIENT
Start: 2021-10-25 | End: 2021-10-25

## 2021-10-25 RX ORDER — SODIUM CHLORIDE 0.9 % (FLUSH) 0.9 %
5-40 SYRINGE (ML) INJECTION PRN
Status: DISCONTINUED | OUTPATIENT
Start: 2021-10-25 | End: 2021-10-25 | Stop reason: SDUPTHER

## 2021-10-25 RX ORDER — LABETALOL HYDROCHLORIDE 5 MG/ML
10 INJECTION, SOLUTION INTRAVENOUS EVERY 4 HOURS PRN
Status: DISCONTINUED | OUTPATIENT
Start: 2021-10-25 | End: 2021-11-04 | Stop reason: HOSPADM

## 2021-10-25 RX ORDER — ARFORMOTEROL TARTRATE 15 UG/2ML
15 SOLUTION RESPIRATORY (INHALATION) 2 TIMES DAILY
Status: DISCONTINUED | OUTPATIENT
Start: 2021-10-25 | End: 2021-11-04 | Stop reason: HOSPADM

## 2021-10-25 RX ORDER — POLYETHYLENE GLYCOL 3350 17 G/17G
17 POWDER, FOR SOLUTION ORAL DAILY PRN
Status: DISCONTINUED | OUTPATIENT
Start: 2021-10-25 | End: 2021-11-04 | Stop reason: HOSPADM

## 2021-10-25 RX ORDER — IPRATROPIUM BROMIDE AND ALBUTEROL SULFATE 2.5; .5 MG/3ML; MG/3ML
1 SOLUTION RESPIRATORY (INHALATION)
Status: DISCONTINUED | OUTPATIENT
Start: 2021-10-26 | End: 2021-11-04 | Stop reason: HOSPADM

## 2021-10-25 RX ORDER — PROPOFOL 10 MG/ML
INJECTION, EMULSION INTRAVENOUS
Status: COMPLETED
Start: 2021-10-25 | End: 2021-10-25

## 2021-10-25 RX ORDER — SODIUM CHLORIDE 0.9 % (FLUSH) 0.9 %
5-40 SYRINGE (ML) INJECTION PRN
Status: DISCONTINUED | OUTPATIENT
Start: 2021-10-25 | End: 2021-11-04 | Stop reason: HOSPADM

## 2021-10-25 RX ORDER — MIDAZOLAM HYDROCHLORIDE 2 MG/2ML
5 INJECTION, SOLUTION INTRAMUSCULAR; INTRAVENOUS ONCE
Status: COMPLETED | OUTPATIENT
Start: 2021-10-25 | End: 2021-10-25

## 2021-10-25 RX ORDER — SODIUM CHLORIDE 9 MG/ML
25 INJECTION, SOLUTION INTRAVENOUS PRN
Status: DISCONTINUED | OUTPATIENT
Start: 2021-10-25 | End: 2021-11-04 | Stop reason: HOSPADM

## 2021-10-25 RX ORDER — IPRATROPIUM BROMIDE AND ALBUTEROL SULFATE 2.5; .5 MG/3ML; MG/3ML
3 SOLUTION RESPIRATORY (INHALATION) EVERY 6 HOURS PRN
Status: DISCONTINUED | OUTPATIENT
Start: 2021-10-25 | End: 2021-11-04 | Stop reason: HOSPADM

## 2021-10-25 RX ORDER — POTASSIUM CHLORIDE 20 MEQ/1
40 TABLET, EXTENDED RELEASE ORAL ONCE
Status: COMPLETED | OUTPATIENT
Start: 2021-10-25 | End: 2021-10-25

## 2021-10-25 RX ORDER — SODIUM CHLORIDE 9 MG/ML
25 INJECTION, SOLUTION INTRAVENOUS PRN
Status: DISCONTINUED | OUTPATIENT
Start: 2021-10-25 | End: 2021-10-25 | Stop reason: SDUPTHER

## 2021-10-25 RX ORDER — PROPOFOL 10 MG/ML
5-50 INJECTION, EMULSION INTRAVENOUS
Status: DISCONTINUED | OUTPATIENT
Start: 2021-10-25 | End: 2021-10-28

## 2021-10-25 RX ORDER — 0.9 % SODIUM CHLORIDE 0.9 %
500 INTRAVENOUS SOLUTION INTRAVENOUS ONCE
Status: COMPLETED | OUTPATIENT
Start: 2021-10-25 | End: 2021-10-25

## 2021-10-25 RX ORDER — WARFARIN SODIUM 10 MG/1
10 TABLET ORAL
Status: DISCONTINUED | OUTPATIENT
Start: 2021-10-25 | End: 2021-10-26

## 2021-10-25 RX ORDER — SODIUM CHLORIDE 9 MG/ML
INJECTION, SOLUTION INTRAVENOUS CONTINUOUS
Status: DISCONTINUED | OUTPATIENT
Start: 2021-10-25 | End: 2021-10-26

## 2021-10-25 RX ORDER — METHYLPREDNISOLONE SODIUM SUCCINATE 125 MG/2ML
60 INJECTION, POWDER, LYOPHILIZED, FOR SOLUTION INTRAMUSCULAR; INTRAVENOUS EVERY 6 HOURS
Status: DISCONTINUED | OUTPATIENT
Start: 2021-10-25 | End: 2021-10-26

## 2021-10-25 RX ORDER — AMLODIPINE BESYLATE 2.5 MG/1
2.5 TABLET ORAL DAILY
Status: DISCONTINUED | OUTPATIENT
Start: 2021-10-25 | End: 2021-11-04 | Stop reason: HOSPADM

## 2021-10-25 RX ORDER — DOXYCYCLINE HYCLATE 100 MG/1
100 CAPSULE ORAL EVERY 12 HOURS SCHEDULED
Status: DISCONTINUED | OUTPATIENT
Start: 2021-10-25 | End: 2021-10-28

## 2021-10-25 RX ORDER — SODIUM CHLORIDE 0.9 % (FLUSH) 0.9 %
5-40 SYRINGE (ML) INJECTION EVERY 12 HOURS SCHEDULED
Status: DISCONTINUED | OUTPATIENT
Start: 2021-10-25 | End: 2021-11-04 | Stop reason: HOSPADM

## 2021-10-25 RX ORDER — SODIUM CHLORIDE 0.9 % (FLUSH) 0.9 %
5-40 SYRINGE (ML) INJECTION EVERY 12 HOURS SCHEDULED
Status: DISCONTINUED | OUTPATIENT
Start: 2021-10-25 | End: 2021-10-25 | Stop reason: SDUPTHER

## 2021-10-25 RX ORDER — ACETAMINOPHEN 650 MG/1
650 SUPPOSITORY RECTAL EVERY 6 HOURS PRN
Status: DISCONTINUED | OUTPATIENT
Start: 2021-10-25 | End: 2021-11-04 | Stop reason: HOSPADM

## 2021-10-25 RX ORDER — HYDRALAZINE HYDROCHLORIDE 20 MG/ML
10 INJECTION INTRAMUSCULAR; INTRAVENOUS EVERY 6 HOURS PRN
Status: DISCONTINUED | OUTPATIENT
Start: 2021-10-25 | End: 2021-10-25

## 2021-10-25 RX ORDER — HYDRALAZINE HYDROCHLORIDE 20 MG/ML
10 INJECTION INTRAMUSCULAR; INTRAVENOUS EVERY 4 HOURS PRN
Status: DISCONTINUED | OUTPATIENT
Start: 2021-10-25 | End: 2021-11-04 | Stop reason: HOSPADM

## 2021-10-25 RX ORDER — POTASSIUM CHLORIDE 7.45 MG/ML
10 INJECTION INTRAVENOUS PRN
Status: DISCONTINUED | OUTPATIENT
Start: 2021-10-25 | End: 2021-11-04 | Stop reason: HOSPADM

## 2021-10-25 RX ORDER — POTASSIUM CHLORIDE 20 MEQ/1
40 TABLET, EXTENDED RELEASE ORAL PRN
Status: DISCONTINUED | OUTPATIENT
Start: 2021-10-25 | End: 2021-11-04 | Stop reason: HOSPADM

## 2021-10-25 RX ORDER — LISINOPRIL 20 MG/1
20 TABLET ORAL 2 TIMES DAILY
Status: DISCONTINUED | OUTPATIENT
Start: 2021-10-25 | End: 2021-11-04 | Stop reason: HOSPADM

## 2021-10-25 RX ORDER — WARFARIN SODIUM 7.5 MG/1
7.5 TABLET ORAL
Status: DISCONTINUED | OUTPATIENT
Start: 2021-10-26 | End: 2021-10-26

## 2021-10-25 RX ORDER — PANTOPRAZOLE SODIUM 40 MG/1
40 TABLET, DELAYED RELEASE ORAL 2 TIMES DAILY
Status: DISCONTINUED | OUTPATIENT
Start: 2021-10-25 | End: 2021-10-26

## 2021-10-25 RX ADMIN — PANTOPRAZOLE SODIUM 40 MG: 40 TABLET, DELAYED RELEASE ORAL at 20:31

## 2021-10-25 RX ADMIN — SODIUM CHLORIDE: 9 INJECTION, SOLUTION INTRAVENOUS at 18:10

## 2021-10-25 RX ADMIN — POTASSIUM CHLORIDE 40 MEQ: 1500 TABLET, EXTENDED RELEASE ORAL at 14:22

## 2021-10-25 RX ADMIN — IPRATROPIUM BROMIDE 0.5 MG: 0.5 SOLUTION RESPIRATORY (INHALATION) at 19:42

## 2021-10-25 RX ADMIN — DOXYCYCLINE HYCLATE 100 MG: 100 CAPSULE ORAL at 20:31

## 2021-10-25 RX ADMIN — WARFARIN SODIUM 10 MG: 10 TABLET ORAL at 20:31

## 2021-10-25 RX ADMIN — POTASSIUM CHLORIDE 40 MEQ: 1500 TABLET, EXTENDED RELEASE ORAL at 17:35

## 2021-10-25 RX ADMIN — DOXYCYCLINE 100 MG: 100 INJECTION, POWDER, LYOPHILIZED, FOR SOLUTION INTRAVENOUS at 12:50

## 2021-10-25 RX ADMIN — MIDAZOLAM HYDROCHLORIDE 4 MG: 1 INJECTION, SOLUTION INTRAMUSCULAR; INTRAVENOUS at 22:45

## 2021-10-25 RX ADMIN — Medication 75 MCG/HR: at 23:23

## 2021-10-25 RX ADMIN — SODIUM CHLORIDE 500 ML: 9 INJECTION, SOLUTION INTRAVENOUS at 12:45

## 2021-10-25 RX ADMIN — CEFTRIAXONE 1000 MG: 1 INJECTION, POWDER, FOR SOLUTION INTRAMUSCULAR; INTRAVENOUS at 12:47

## 2021-10-25 RX ADMIN — PROPOFOL 40 MCG/KG/MIN: 10 INJECTION, EMULSION INTRAVENOUS at 23:22

## 2021-10-25 RX ADMIN — HYDRALAZINE HYDROCHLORIDE 10 MG: 20 INJECTION INTRAMUSCULAR; INTRAVENOUS at 17:35

## 2021-10-25 RX ADMIN — SODIUM CHLORIDE, PRESERVATIVE FREE 10 ML: 5 INJECTION INTRAVENOUS at 12:53

## 2021-10-25 RX ADMIN — HYDRALAZINE HYDROCHLORIDE 10 MG: 20 INJECTION INTRAMUSCULAR; INTRAVENOUS at 22:13

## 2021-10-25 RX ADMIN — PREDNISONE 20 MG: 20 TABLET ORAL at 20:31

## 2021-10-25 RX ADMIN — LISINOPRIL 20 MG: 20 TABLET ORAL at 20:32

## 2021-10-25 RX ADMIN — ARFORMOTEROL TARTRATE 15 MCG: 15 SOLUTION RESPIRATORY (INHALATION) at 19:42

## 2021-10-25 ASSESSMENT — PULMONARY FUNCTION TESTS
PIF_VALUE: 32
PIF_VALUE: 35

## 2021-10-25 ASSESSMENT — PAIN SCALES - GENERAL
PAINLEVEL_OUTOF10: 0

## 2021-10-25 ASSESSMENT — PAIN DESCRIPTION - PAIN TYPE: TYPE: ACUTE PAIN

## 2021-10-25 ASSESSMENT — PAIN DESCRIPTION - LOCATION: LOCATION: ABDOMEN

## 2021-10-25 NOTE — ED NOTES
FIRST PROVIDER CONTACT ASSESSMENT NOTE      Department of Emergency Medicine   Admit Date: No admission date for patient encounter. Chief Complaint: No chief complaint on file. History of Present Illness:    Batsheva Alejandre is a [de-identified] y.o. female who presents to the ED for sob and sides of the chest. States began on Saturday and was seen here for the same.  Does have copd, and is o2 dependent 2 lmp NC.         -----------------END OF FIRST PROVIDER CONTACT ASSESSMENT NOTE--------------  Electronically signed by YOSEPH Ovalle CNP   DD: 10/25/21               YOSEPH Mcdaniel CNP  10/25/21 [id number]

## 2021-10-26 ENCOUNTER — APPOINTMENT (OUTPATIENT)
Dept: GENERAL RADIOLOGY | Age: 81
DRG: 871 | End: 2021-10-26
Payer: MEDICARE

## 2021-10-26 LAB
AADO2: 224 MMHG
ADENOVIRUS BY PCR: NOT DETECTED
ALBUMIN SERPL-MCNC: 3.4 G/DL (ref 3.5–5.2)
ALBUMIN SERPL-MCNC: 3.6 G/DL (ref 3.5–5.2)
ALP BLD-CCNC: 110 U/L (ref 35–104)
ALP BLD-CCNC: 124 U/L (ref 35–104)
ALT SERPL-CCNC: 13 U/L (ref 0–32)
ALT SERPL-CCNC: 15 U/L (ref 0–32)
ANION GAP SERPL CALCULATED.3IONS-SCNC: 11 MMOL/L (ref 7–16)
ANION GAP SERPL CALCULATED.3IONS-SCNC: 11 MMOL/L (ref 7–16)
AST SERPL-CCNC: 13 U/L (ref 0–31)
AST SERPL-CCNC: 19 U/L (ref 0–31)
B.E.: -0.2 MMOL/L (ref -3–3)
B.E.: 1.6 MMOL/L (ref -3–0)
BACTERIA: ABNORMAL /HPF
BASOPHILS ABSOLUTE: 0.02 E9/L (ref 0–0.2)
BASOPHILS RELATIVE PERCENT: 0.1 % (ref 0–2)
BILIRUB SERPL-MCNC: 0.2 MG/DL (ref 0–1.2)
BILIRUB SERPL-MCNC: 0.2 MG/DL (ref 0–1.2)
BILIRUBIN URINE: NEGATIVE
BLOOD, URINE: ABNORMAL
BORDETELLA PARAPERTUSSIS BY PCR: NOT DETECTED
BORDETELLA PERTUSSIS BY PCR: NOT DETECTED
BUN BLDV-MCNC: 19 MG/DL (ref 6–23)
BUN BLDV-MCNC: 21 MG/DL (ref 6–23)
C-REACTIVE PROTEIN: 1 MG/DL (ref 0–0.4)
CALCIUM SERPL-MCNC: 8.6 MG/DL (ref 8.6–10.2)
CALCIUM SERPL-MCNC: 8.9 MG/DL (ref 8.6–10.2)
CHLAMYDOPHILIA PNEUMONIAE BY PCR: NOT DETECTED
CHLORIDE BLD-SCNC: 105 MMOL/L (ref 98–107)
CHLORIDE BLD-SCNC: 106 MMOL/L (ref 98–107)
CLARITY: CLEAR
CO2: 25 MMOL/L (ref 22–29)
CO2: 26 MMOL/L (ref 22–29)
COARSE CASTS, UA: ABNORMAL /LPF (ref 0–2)
COHB: 0.9 % (ref 0–1.5)
COLOR: YELLOW
CORONAVIRUS 229E BY PCR: NOT DETECTED
CORONAVIRUS HKU1 BY PCR: NOT DETECTED
CORONAVIRUS NL63 BY PCR: NOT DETECTED
CORONAVIRUS OC43 BY PCR: NOT DETECTED
CREAT SERPL-MCNC: 0.8 MG/DL (ref 0.5–1)
CREAT SERPL-MCNC: 0.8 MG/DL (ref 0.5–1)
CRITICAL: NORMAL
DATE ANALYZED: NORMAL
DATE OF COLLECTION: NORMAL
DELIVERY SYSTEMS: ABNORMAL
DEVICE: ABNORMAL
EOSINOPHILS ABSOLUTE: 0.01 E9/L (ref 0.05–0.5)
EOSINOPHILS RELATIVE PERCENT: 0.1 % (ref 0–6)
EPITHELIAL CELLS, UA: ABNORMAL /HPF
FINE CASTS, UA: ABNORMAL /LPF (ref 0–2)
FIO2 ARTERIAL: 100
FIO2: 50 %
GFR AFRICAN AMERICAN: >60
GFR AFRICAN AMERICAN: >60
GFR NON-AFRICAN AMERICAN: >60 ML/MIN/1.73
GFR NON-AFRICAN AMERICAN: >60 ML/MIN/1.73
GLUCOSE BLD-MCNC: 135 MG/DL (ref 74–99)
GLUCOSE BLD-MCNC: 180 MG/DL (ref 74–99)
GLUCOSE URINE: 100 MG/DL
HCO3 ARTERIAL: 28.7 MMOL/L (ref 22–26)
HCO3: 23.8 MMOL/L (ref 22–26)
HCT VFR BLD CALC: 40.8 % (ref 34–48)
HEMOGLOBIN: 13 G/DL (ref 11.5–15.5)
HHB: 4.7 % (ref 0–5)
HUMAN METAPNEUMOVIRUS BY PCR: NOT DETECTED
HUMAN RHINOVIRUS/ENTEROVIRUS BY PCR: NOT DETECTED
IMMATURE GRANULOCYTES #: 0.06 E9/L
IMMATURE GRANULOCYTES %: 0.4 % (ref 0–5)
INFLUENZA A BY PCR: NOT DETECTED
INFLUENZA B BY PCR: NOT DETECTED
KETONES, URINE: NEGATIVE MG/DL
L. PNEUMOPHILA SEROGP 1 UR AG: NORMAL
LAB: NORMAL
LACTIC ACID: 1.6 MMOL/L (ref 0.5–2.2)
LEUKOCYTE ESTERASE, URINE: NEGATIVE
LYMPHOCYTES ABSOLUTE: 0.83 E9/L (ref 1.5–4)
LYMPHOCYTES RELATIVE PERCENT: 6.1 % (ref 20–42)
Lab: NORMAL
MAGNESIUM: 1.9 MG/DL (ref 1.6–2.6)
MCH RBC QN AUTO: 30.1 PG (ref 26–35)
MCHC RBC AUTO-ENTMCNC: 31.9 % (ref 32–34.5)
MCV RBC AUTO: 94.4 FL (ref 80–99.9)
METHB: 0.2 % (ref 0–1.5)
MODE: AC
MODE: AC
MONOCYTES ABSOLUTE: 0.37 E9/L (ref 0.1–0.95)
MONOCYTES RELATIVE PERCENT: 2.7 % (ref 2–12)
MYCOPLASMA PNEUMONIAE BY PCR: NOT DETECTED
NEUTROPHILS ABSOLUTE: 12.25 E9/L (ref 1.8–7.3)
NEUTROPHILS RELATIVE PERCENT: 90.6 % (ref 43–80)
NITRITE, URINE: NEGATIVE
O2 CONTENT: 18.1 ML/DL
O2 SATURATION: 95.2 % (ref 92–98.5)
O2 SATURATION: 99.8 % (ref 92–98.5)
O2HB: 94.2 % (ref 94–97)
OPERATOR ID: 1190
OPERATOR ID: 3342
PARAINFLUENZA VIRUS 1 BY PCR: NOT DETECTED
PARAINFLUENZA VIRUS 2 BY PCR: NOT DETECTED
PARAINFLUENZA VIRUS 3 BY PCR: NOT DETECTED
PARAINFLUENZA VIRUS 4 BY PCR: NOT DETECTED
PATIENT TEMP: 37
PATIENT TEMP: 37 C
PCO2 (TEMP CORRECTED): 53.5 MMHG (ref 35–45)
PCO2: 36.8 MMHG (ref 35–45)
PDW BLD-RTO: 14.4 FL (ref 11.5–15)
PEEP/CPAP: 5 CMH2O
PFO2: 1.57 MMHG/%
PH (TEMPERATURE CORRECTED): 7.34 (ref 7.35–7.45)
PH BLOOD GAS: 7.43 (ref 7.35–7.45)
PH UA: 5.5 (ref 5–9)
PHOSPHORUS: 3.6 MG/DL (ref 2.5–4.5)
PLATELET # BLD: 356 E9/L (ref 130–450)
PMV BLD AUTO: 10.2 FL (ref 7–12)
PO2 (TEMP CORRECTED): 231.6 MMHG (ref 60–80)
PO2: 78.6 MMHG (ref 75–100)
POSITIVE END EXP PRESS: 5 CMH2O
POTASSIUM SERPL-SCNC: 3.5 MMOL/L (ref 3.5–5)
POTASSIUM SERPL-SCNC: 4.3 MMOL/L (ref 3.5–5)
PROCALCITONIN: 0.06 NG/ML (ref 0–0.08)
PROCALCITONIN: 0.32 NG/ML (ref 0–0.08)
PROTEIN UA: 100 MG/DL
RBC # BLD: 4.32 E12/L (ref 3.5–5.5)
RBC UA: ABNORMAL /HPF (ref 0–2)
RENAL EPITHELIAL, UA: ABNORMAL /HPF
RESPIRATORY RATE: 16 B/MIN
RESPIRATORY SYNCYTIAL VIRUS BY PCR: NOT DETECTED
RI(T): 285 %
RR MECHANICAL: 18 B/MIN
SARS-COV-2, PCR: NOT DETECTED
SEDIMENTATION RATE, ERYTHROCYTE: 7 MM/HR (ref 0–20)
SODIUM BLD-SCNC: 141 MMOL/L (ref 132–146)
SODIUM BLD-SCNC: 143 MMOL/L (ref 132–146)
SOURCE, BLOOD GAS: ABNORMAL
SOURCE, BLOOD GAS: NORMAL
SPECIFIC GRAVITY UA: >=1.03 (ref 1–1.03)
STREP PNEUMONIAE ANTIGEN, URINE: NORMAL
T4 FREE: 1.28 NG/DL (ref 0.93–1.7)
THB: 13.6 G/DL (ref 11.5–16.5)
TIDAL VOLUME: 340 ML
TIME ANALYZED: 708
TOTAL PROTEIN: 6.4 G/DL (ref 6.4–8.3)
TOTAL PROTEIN: 6.7 G/DL (ref 6.4–8.3)
TROPONIN, HIGH SENSITIVITY: 37 NG/L (ref 0–9)
TSH SERPL DL<=0.05 MIU/L-ACNC: 0.47 UIU/ML (ref 0.27–4.2)
UROBILINOGEN, URINE: 0.2 E.U./DL
VT MECHANICAL: 340 ML
WBC # BLD: 13.5 E9/L (ref 4.5–11.5)
WBC UA: ABNORMAL /HPF (ref 0–5)

## 2021-10-26 PROCEDURE — 87449 NOS EACH ORGANISM AG IA: CPT

## 2021-10-26 PROCEDURE — 85651 RBC SED RATE NONAUTOMATED: CPT

## 2021-10-26 PROCEDURE — 84443 ASSAY THYROID STIM HORMONE: CPT

## 2021-10-26 PROCEDURE — 6370000000 HC RX 637 (ALT 250 FOR IP): Performed by: STUDENT IN AN ORGANIZED HEALTH CARE EDUCATION/TRAINING PROGRAM

## 2021-10-26 PROCEDURE — 81001 URINALYSIS AUTO W/SCOPE: CPT

## 2021-10-26 PROCEDURE — 6370000000 HC RX 637 (ALT 250 FOR IP): Performed by: PHYSICIAN ASSISTANT

## 2021-10-26 PROCEDURE — 84145 PROCALCITONIN (PCT): CPT

## 2021-10-26 PROCEDURE — 6370000000 HC RX 637 (ALT 250 FOR IP): Performed by: INTERNAL MEDICINE

## 2021-10-26 PROCEDURE — 82803 BLOOD GASES ANY COMBINATION: CPT

## 2021-10-26 PROCEDURE — 87070 CULTURE OTHR SPECIMN AEROBIC: CPT

## 2021-10-26 PROCEDURE — 82805 BLOOD GASES W/O2 SATURATION: CPT

## 2021-10-26 PROCEDURE — 94640 AIRWAY INHALATION TREATMENT: CPT

## 2021-10-26 PROCEDURE — 6360000002 HC RX W HCPCS: Performed by: INTERNAL MEDICINE

## 2021-10-26 PROCEDURE — 2580000003 HC RX 258: Performed by: PHYSICIAN ASSISTANT

## 2021-10-26 PROCEDURE — 84100 ASSAY OF PHOSPHORUS: CPT

## 2021-10-26 PROCEDURE — 36600 WITHDRAWAL OF ARTERIAL BLOOD: CPT

## 2021-10-26 PROCEDURE — 6360000002 HC RX W HCPCS: Performed by: PHYSICIAN ASSISTANT

## 2021-10-26 PROCEDURE — 87206 SMEAR FLUORESCENT/ACID STAI: CPT

## 2021-10-26 PROCEDURE — 36415 COLL VENOUS BLD VENIPUNCTURE: CPT

## 2021-10-26 PROCEDURE — 2580000003 HC RX 258: Performed by: INTERNAL MEDICINE

## 2021-10-26 PROCEDURE — 2000000000 HC ICU R&B

## 2021-10-26 PROCEDURE — 85025 COMPLETE CBC W/AUTO DIFF WBC: CPT

## 2021-10-26 PROCEDURE — 6360000002 HC RX W HCPCS

## 2021-10-26 PROCEDURE — 87088 URINE BACTERIA CULTURE: CPT

## 2021-10-26 PROCEDURE — 2500000003 HC RX 250 WO HCPCS: Performed by: INTERNAL MEDICINE

## 2021-10-26 PROCEDURE — 80053 COMPREHEN METABOLIC PANEL: CPT

## 2021-10-26 PROCEDURE — 83735 ASSAY OF MAGNESIUM: CPT

## 2021-10-26 PROCEDURE — 89220 SPUTUM SPECIMEN COLLECTION: CPT

## 2021-10-26 PROCEDURE — 6360000002 HC RX W HCPCS: Performed by: STUDENT IN AN ORGANIZED HEALTH CARE EDUCATION/TRAINING PROGRAM

## 2021-10-26 PROCEDURE — 94003 VENT MGMT INPAT SUBQ DAY: CPT

## 2021-10-26 PROCEDURE — 71045 X-RAY EXAM CHEST 1 VIEW: CPT

## 2021-10-26 PROCEDURE — 2580000003 HC RX 258: Performed by: EMERGENCY MEDICINE

## 2021-10-26 PROCEDURE — 51702 INSERT TEMP BLADDER CATH: CPT

## 2021-10-26 RX ORDER — SODIUM CHLORIDE, SODIUM LACTATE, POTASSIUM CHLORIDE, CALCIUM CHLORIDE 600; 310; 30; 20 MG/100ML; MG/100ML; MG/100ML; MG/100ML
INJECTION, SOLUTION INTRAVENOUS CONTINUOUS
Status: DISCONTINUED | OUTPATIENT
Start: 2021-10-26 | End: 2021-10-26

## 2021-10-26 RX ORDER — SENNOSIDES 8.8 MG/5ML
5 LIQUID ORAL NIGHTLY
Status: DISCONTINUED | OUTPATIENT
Start: 2021-10-26 | End: 2021-11-04 | Stop reason: HOSPADM

## 2021-10-26 RX ORDER — DOCUSATE SODIUM 50 MG/5ML
100 LIQUID ORAL DAILY
Status: DISCONTINUED | OUTPATIENT
Start: 2021-10-26 | End: 2021-11-04 | Stop reason: HOSPADM

## 2021-10-26 RX ORDER — METHYLPREDNISOLONE SODIUM SUCCINATE 40 MG/ML
40 INJECTION, POWDER, LYOPHILIZED, FOR SOLUTION INTRAMUSCULAR; INTRAVENOUS EVERY 12 HOURS
Status: DISCONTINUED | OUTPATIENT
Start: 2021-10-26 | End: 2021-11-03

## 2021-10-26 RX ORDER — SODIUM CHLORIDE 9 MG/ML
10 INJECTION INTRAVENOUS DAILY
Status: DISCONTINUED | OUTPATIENT
Start: 2021-10-27 | End: 2021-11-04 | Stop reason: HOSPADM

## 2021-10-26 RX ORDER — PANTOPRAZOLE SODIUM 40 MG/10ML
40 INJECTION, POWDER, LYOPHILIZED, FOR SOLUTION INTRAVENOUS DAILY
Status: DISCONTINUED | OUTPATIENT
Start: 2021-10-27 | End: 2021-11-04 | Stop reason: HOSPADM

## 2021-10-26 RX ORDER — CHLORHEXIDINE GLUCONATE 0.12 MG/ML
15 RINSE ORAL 2 TIMES DAILY
Status: DISCONTINUED | OUTPATIENT
Start: 2021-10-26 | End: 2021-10-28

## 2021-10-26 RX ADMIN — PROPOFOL 40 MCG/KG/MIN: 10 INJECTION, EMULSION INTRAVENOUS at 06:40

## 2021-10-26 RX ADMIN — CEFTRIAXONE 1000 MG: 1 INJECTION, POWDER, FOR SOLUTION INTRAMUSCULAR; INTRAVENOUS at 12:24

## 2021-10-26 RX ADMIN — PROPOFOL 35 MCG/KG/MIN: 10 INJECTION, EMULSION INTRAVENOUS at 11:25

## 2021-10-26 RX ADMIN — LISINOPRIL 20 MG: 20 TABLET ORAL at 08:31

## 2021-10-26 RX ADMIN — IPRATROPIUM BROMIDE AND ALBUTEROL SULFATE 1 AMPULE: .5; 2.5 SOLUTION RESPIRATORY (INHALATION) at 14:00

## 2021-10-26 RX ADMIN — METHYLPREDNISOLONE SODIUM SUCCINATE 60 MG: 125 INJECTION, POWDER, LYOPHILIZED, FOR SOLUTION INTRAMUSCULAR; INTRAVENOUS at 03:32

## 2021-10-26 RX ADMIN — SODIUM CHLORIDE, PRESERVATIVE FREE 10 ML: 5 INJECTION INTRAVENOUS at 08:55

## 2021-10-26 RX ADMIN — ENOXAPARIN SODIUM 90 MG: 100 INJECTION SUBCUTANEOUS at 20:38

## 2021-10-26 RX ADMIN — METHYLPREDNISOLONE SODIUM SUCCINATE 60 MG: 125 INJECTION, POWDER, LYOPHILIZED, FOR SOLUTION INTRAMUSCULAR; INTRAVENOUS at 08:31

## 2021-10-26 RX ADMIN — Medication 125 MCG/HR: at 08:34

## 2021-10-26 RX ADMIN — CHLORHEXIDINE GLUCONATE 15 ML: 1.2 RINSE ORAL at 20:38

## 2021-10-26 RX ADMIN — PROPOFOL 35 MCG/KG/MIN: 10 INJECTION, EMULSION INTRAVENOUS at 22:28

## 2021-10-26 RX ADMIN — DOXYCYCLINE HYCLATE 100 MG: 100 CAPSULE ORAL at 08:31

## 2021-10-26 RX ADMIN — SODIUM CHLORIDE, PRESERVATIVE FREE 10 ML: 5 INJECTION INTRAVENOUS at 03:40

## 2021-10-26 RX ADMIN — PANTOPRAZOLE SODIUM 40 MG: 40 TABLET, DELAYED RELEASE ORAL at 08:31

## 2021-10-26 RX ADMIN — PROPOFOL 40 MCG/KG/MIN: 10 INJECTION, EMULSION INTRAVENOUS at 01:58

## 2021-10-26 RX ADMIN — ARFORMOTEROL TARTRATE 15 MCG: 15 SOLUTION RESPIRATORY (INHALATION) at 09:48

## 2021-10-26 RX ADMIN — CHLORHEXIDINE GLUCONATE 15 ML: 1.2 RINSE ORAL at 08:55

## 2021-10-26 RX ADMIN — ARFORMOTEROL TARTRATE 15 MCG: 15 SOLUTION RESPIRATORY (INHALATION) at 21:07

## 2021-10-26 RX ADMIN — AMLODIPINE BESYLATE 2.5 MG: 2.5 TABLET ORAL at 08:31

## 2021-10-26 RX ADMIN — LISINOPRIL 20 MG: 20 TABLET ORAL at 17:45

## 2021-10-26 RX ADMIN — SENNOSIDES 8.8 MG: 8.8 SYRUP ORAL at 20:37

## 2021-10-26 RX ADMIN — METHYLPREDNISOLONE SODIUM SUCCINATE 40 MG: 40 INJECTION, POWDER, LYOPHILIZED, FOR SOLUTION INTRAMUSCULAR; INTRAVENOUS at 20:38

## 2021-10-26 RX ADMIN — SODIUM CHLORIDE, PRESERVATIVE FREE 10 ML: 5 INJECTION INTRAVENOUS at 20:38

## 2021-10-26 RX ADMIN — ENOXAPARIN SODIUM 90 MG: 100 INJECTION SUBCUTANEOUS at 12:25

## 2021-10-26 RX ADMIN — DOXYCYCLINE HYCLATE 100 MG: 100 CAPSULE ORAL at 20:38

## 2021-10-26 RX ADMIN — IPRATROPIUM BROMIDE AND ALBUTEROL SULFATE 1 AMPULE: .5; 2.5 SOLUTION RESPIRATORY (INHALATION) at 09:47

## 2021-10-26 RX ADMIN — PROPOFOL 30 MCG/KG/MIN: 10 INJECTION, EMULSION INTRAVENOUS at 16:24

## 2021-10-26 RX ADMIN — IPRATROPIUM BROMIDE AND ALBUTEROL SULFATE 1 AMPULE: .5; 2.5 SOLUTION RESPIRATORY (INHALATION) at 21:06

## 2021-10-26 RX ADMIN — SODIUM CHLORIDE, POTASSIUM CHLORIDE, SODIUM LACTATE AND CALCIUM CHLORIDE: 600; 310; 30; 20 INJECTION, SOLUTION INTRAVENOUS at 05:28

## 2021-10-26 ASSESSMENT — PULMONARY FUNCTION TESTS
PIF_VALUE: 33
PIF_VALUE: 23
PIF_VALUE: 21
PIF_VALUE: 35
PIF_VALUE: 31
PIF_VALUE: 22
PIF_VALUE: 24
PIF_VALUE: 21
PIF_VALUE: 22
PIF_VALUE: 21
PIF_VALUE: 19
PIF_VALUE: 23
PIF_VALUE: 21
PIF_VALUE: 22
PIF_VALUE: 23
PIF_VALUE: 21
PIF_VALUE: 22
PIF_VALUE: 22
PIF_VALUE: 25
PIF_VALUE: 35
PIF_VALUE: 22
PIF_VALUE: 25
PIF_VALUE: 22
PIF_VALUE: 25
PIF_VALUE: 21
PIF_VALUE: 25
PIF_VALUE: 23
PIF_VALUE: 25

## 2021-10-26 ASSESSMENT — PAIN SCALES - GENERAL: PAINLEVEL_OUTOF10: 0

## 2021-10-26 NOTE — SIGNIFICANT EVENT
RRT called  bc of severe respiratory distress    +accessory muscle use  RR >40  Although o2 sat on NRB >90%  Severe hypercapnic respiratory failure noted  And AMS/lethargic  Worrisome for impending respiratory fatigue and air compromise    Morbidly obese ? Lisa  Known copd and with chronic use home o2 LNC  Admitted earlier today with respiratory distress/sob. covid test neg. Per ER-- but this was actually collected on 10/22/21  No respiratory panel, procalcitonin etc. Ordered. probnp 1,100  BP (!) 153/118   Pulse 130   Temp 97.8 °F (36.6 °C) (Axillary)   Resp (!) 34   Ht 5' 5\" (1.651 m)   Wt 195 lb (88.5 kg)   SpO2 94%   BMI 32.45 kg/m²   Monitor sinus tachy    Today started on rocephin +doxy  Steroids  duonebs just given in past hour, still somewhat wheezy      bp very high in code, 230/120  ?flash pulm edema 2 to high afterload   HEENT isaac facial complexion- NRB  Chest coarse sounds  Heart regualar tachy  abd soft, inc habitus  No CCE    glidescope done  Cords ant -short neck, no vomitus seen  During intubation, lots of frothy pulm edema noted in pharnyx then thru vocal cords  Suspected some pulm edema component  No other aspiration seen        Wbc 12.7  ? left lower lobe pneumonia bacterial  (+/- viral )  cxr from ER today:  The right lung is clear.       There is an infiltrate seen within the left lung base.  This is seen on both   the PA and lateral views.  The left upper lobe is clear     She supposedly acutely decompensated once on floor  bp very high in code, 230/120  ? likley component of flash pulm edema 2 to high afterload     Back in Jan.  Echo:  Left ventricular internal dimensions were normal in diastole and systole. No regional wall motion abnormalities seen. inr 1.8 ? roosevelt out PE  But on coumadin and severely hypercapnic without hypoxia    abg prior to intubation on NRB  PCo2  117  Ph 7.057  po2 192  Bicarb 32    Severe hypercapnic respriatory failure  Now successfully intubated, per respriatory some resistance to bagging noted  +bronchospasm noted, started on oral prednisone in ER, will switch to IV solumedrol  And duonebs, abx, pneumonia workup- respiratory panel, sputum, urine pneumonia testing  Sedation, ?biting tube somewhat  Hold IV fluids for now  Continue abx per pulmoanry  Critical care  Consulted    I discussed with dr. Jax Hernández ICU  45min CCT

## 2021-10-26 NOTE — PATIENT CARE CONFERENCE
Intensive Care Daily Quality Rounding Checklist      ICU Team Members: Dr. Yolanda Cervnates, residents Dr. Lisbeth Mishra, bedside RN, TL, RT, clinical pharmacist    ICU Day #: NUMBER: 2    Intubation Date: October 25    Ventilator Day #: NUMBER: 2    Central Line Insertion Date:  n/a        Day #:      Arterial Line Insertion Date: n/a       Day #:     Temporary Hemodialysis Catheter Insertion Date:  n/a      Day #     DVT Prophylaxis: stop coumadin and change to full dose lovenox    GI Prophylaxis: protonix     Arreguin Catheter Insertion Date: October 25       Day #: 2      Continued need (if yes, reason documented and discussed with physician): yes, critical care, I/O    Skin Issues/ Wounds and ordered treatment discussed on rounds: N     Goals/ Plans for the Day:  Wean vent and sedation as able, renew restraints, continue critical care management  iv team to place peripheral access, wean vent as tolerated, continue to monitor urine output, add on procalcitonin

## 2021-10-26 NOTE — CONSULTS
10/26/2021  11:15 AM      Comprehensive Nutrition Assessment    Type and Reason for Visit:  Initial, Consult (Tube Feeding Ordering and Management)    Nutrition Recommendations/Plan: While pt is on propofol, will change TF to Peptide based, High Protein to allow adequate protein intake and avoid overfeeding    TF ORDER: Peptide Based, High Protein TF (Vital HP) to goal rate 40 ml/hr and 30 ml flush Q4 hr  This will provide: 960 ml/d, 960 jessica (1520 jessica w/propofol), 84 g pro, 983 ml total free water  This regimen will meet 100% calorie needs, 100% protein needs    Recommend Consult dietitian for updated TF order or Recommendation when propofol off    Nutrition Assessment:  Pt admit 2/2 PNA. SOB and cough PTA w/hx. COPD. S/p RRT likely flash pulmonary edema-ICU and intubated. Currently on propofol. Will order TF to meet needs w/current level of propofol and recommend consult Dietitian for updated TF rec when propofol weaned off. Malnutrition Assessment:  Malnutrition Status: At risk for malnutrition (Comment) (intubated/EN support)    Context:  Acute Illness     Findings of the 6 clinical characteristics of malnutrition:  Energy Intake:  Mild decrease in energy intake (Comment) (PTA and prior to TF)  Weight Loss:  Unable to assess     Body Fat Loss:  No significant body fat loss     Muscle Mass Loss:  No significant muscle mass loss    Fluid Accumulation:  No significant fluid accumulation     Strength:  Not Performed    Estimated Daily Nutrient Needs:  Energy (kcal):  ; Weight Used for Energy Requirements:  Current     Protein (g):  75-85 (1.3-1.5 g/kg);  Weight Used for Protein Requirements:  Ideal        Fluid (ml/day):  per Critical Care; Method Used for Fluid Requirements:  Other (Comment)      Nutrition Related Findings:  intubated/sedated, abd cramps, gen +1 edema, MAP 65, missing teeth/full dentures(out), I/O WNL      Wounds:  None       Current Nutrition Therapies:    Current Tube Feeding (TF) Orders:  · Feeding Route: Orogastric  · Formula: Standard with Fiber  · Schedule: Continuous (goal rate = 45 ml/hr)  · Additives/Modulars:  (none)  · Water Flushes: 30 ml Q 4 hr = 180 ml/d  · Current TF & Flush Orders Provides: 10 ml/hr = 240 ml/d, 360 jessica, 15 g pro, 362 ml total free water  · Goal TF & Flush Orders Provides: 1080 ml/d, 1620 jessica, 69 g pro, 1001 ml total free water    Additional Calorie Sources:   propofol =560 jessica    Anthropometric Measures:  · Height: 5' 5\" (165.1 cm)  · Current Body Weight: 195 lb (88.5 kg) (10/25 method not doc)   · Admission Body Weight:      · Usual Body Weight: 193 lb (87.5 kg) (per EMR x 6 mo)     · Ideal Body Weight: 125 lbs; % Ideal Body Weight 156 %   · BMI: 32.4  · BMI Categories: Obese Class 1 (BMI 30.0-34. 9)       Nutrition Diagnosis:   · Inadequate oral intake related to impaired respiratory function (2/2 PNA and hx. COPD) as evidenced by NPO or clear liquid status due to medical condition, intubation, nutrition support - enteral nutrition      Nutrition Interventions:   Food and/or Nutrient Delivery:  Continue NPO, Continue Current Tube Feeding (Recommend Consult Dietitian for updated TF order/rec when propofol weaned off)  Nutrition Education/Counseling:  No recommendation at this time   Coordination of Nutrition Care:  Continue to monitor while inpatient    Goals:  Pt marcella EN at goal rate       Nutrition Monitoring and Evaluation:   Behavioral-Environmental Outcomes:  None Identified   Food/Nutrient Intake Outcomes:  Enteral Nutrition Intake/Tolerance  Physical Signs/Symptoms Outcomes:  Biochemical Data, GI Status, Fluid Status or Edema, Hemodynamic Status, Nutrition Focused Physical Findings, Skin, Weight     Discharge Planning:     Too soon to determine     Electronically signed by Charles Elizabeth RD, CNSC, LD on 10/26/21 at 11:15 AM EDT    Contact: [phone]

## 2021-10-26 NOTE — PROGRESS NOTES
Updated daughter Jeevan Rides on patient condition. Daughter stated patient was being treated for UTI outpatient and was on Nitrofurantoin 100mg. Patient had only had a few doses prior to admission. Support given.

## 2021-10-26 NOTE — CARE COORDINATION
Admit ICU for severe hypercapnic respiratory failure. Pt intubated and sedated. According to noted, pt has home o2 through [facility] and hx of [facility] home care in past. Lives alone, close to daughter. Will call daughter to confirm information.  Discharge TBD pending progress-o

## 2021-10-26 NOTE — ED PROVIDER NOTES
Chief Complaint   Patient presents with    Shortness of Breath     hx of copd was gere on the 22nd not getting better    Abdominal Pain       HPI  Kolton Rosado is a [de-identified] y.o. female with a PMHx significant for HTN, DVT, PE,COPD, FAISAL and anemia who presents with several days of worsening SOB and a new productive cough. The complaints are persistent, moderate in severity, worsened by activity and improved by nothing. The patient states she was seen in the ER a few days ago for a COPD exacerbation. She says she was given breathing treatment and steroids and felt somewhat improved and was subsequently discharged home. However, she states that the next morning she woke up with a new cough and worsening SOB. She says she hoped she would improve over the next few days, but hasn't, prompting her visit today. The patient denies recent trauma, fever, chills, HA, dizziness, lightheadedness, paresthesias, generalized weakness, confusion, forgetfulness, vision changes, eye redness, congestion, rhinorrhea, sore throat, neck pain, chest pain, palpitations, LE edema, wheezing, nausea, vomiting, diarrhea, constipation, hematochezia, melena, dysuria, hematuria, flank pain, decreased UOP, myalgias, arthralgias, ROM issues, swelling, rashes, erythema, easy bleeding, lymphadenopathy, symptoms of anxiety and depression, SI/HI and AVH. ROS is otherwise negative. The patient is currently taking Warfarin. Tobacco Hx:   reports that she has been smoking cigarettes. She started smoking about 68 years ago. She has a 16.25 pack-year smoking history. She has never used smokeless tobacco.    Alcohol Hx:   reports previous alcohol use. Illicit Drug Hx:   reports no history of drug use. Occupation:   n/a    The history is provided by the patient.     Last Tetanus (if applicable): n/a    Review of Systems:   A complete review of systems was performed and pertinent positives and negatives are stated within the HPI, all other systems reviewed and are negative. Physical Exam:  GEN: Cooperative, well-developed, well-nourished adult in NAD; pt appears stated age  Head: Normocephalic and atraumatic; no tenderness to palpation anywhere  Eyes: PERRL, EOMI, conjunctiva clear, cornea without gross abnormality, no visual deficits noted b/l  Ears: External ear normal-appearing and non-tender b/l; no hearing deficit noted  Nose: Nares patent and free of debris; septum midline; mucosa appears normal; no drainage noted  Throat: Oral mucosa moist and pink with no lesions noted; dentition wnl; no posterior pharyngeal erythema or edema; tonsils are not swollen and there is no exudate noted; no post-nasal drip  Neck: Supple and symmetrical with midline trachea; no cervical or supraclavicular lymphadenopathy noted; thyroid not palpated, but no tenderness or masses noted in the region; normal ROM with no meningeal signs  Lungs: Decreased air movement noted throughout with scattered wheezes and bibasilar crackles.  No rhonchi noted; no respiratory distress, breathing unlabored   CV: RRR, no murmurs, gallops or rubs noted on auscultation, normal S1/S2; UE and LE distal pulses intact b/l +2/4 with no cyanosis noted; no LE edema noted b/l; capillary refill < 2 seconds  ABD: Soft, non-tender, non-distended, no organomegaly, hernias or masses noted  /Rectal: no suprapubic tenderness; remainder of exam deferred  MSK: UEs and LEs without notable trauma, ROM restriction or tenderness to palpation b/l; normal strength throughout  Skin: Skin warm and dry without notable rash, erythema, bruising or lesion; normal turgor  Neuro: A&O x3; CN II-XII appear grossly intact; no focal deficits appreciated; pt thoughtful in discussion and able to answer all questions without issue  Psych: normal attention with no obvious AVH, normal mood, normal affect, no SI/HI; patient with appropriate judgement    --------------------------------------------- PAST HISTORY ---------------------------------------------  Past Medical History:  has a past medical history of COPD (chronic obstructive pulmonary disease) ([facility] DVT (deep venous thrombosis) ([facility] Hypertension, and Pulmonary embolism ([facility] Past Surgical History:  has a past surgical history that includes Cholecystectomy; Mastectomy, bilateral (1988); Carpal tunnel release (Right); and Hysterectomy. Social History:  reports that she has been smoking cigarettes. She started smoking about 68 years ago. She has a 16.25 pack-year smoking history. She has never used smokeless tobacco. She reports previous alcohol use. She reports that she does not use drugs. Family History: family history includes Heart Attack in her mother; Other in her brother. Home Meds: Medications Prior to Admission: predniSONE (DELTASONE) 20 MG tablet, Take 1 tablet by mouth 2 times daily for 5 days  ferrous sulfate (IRON 325) 325 (65 Fe) MG tablet, Take 1 tablet by mouth daily (with breakfast)  warfarin (COUMADIN) 10 MG tablet, Take 1 tablet by mouth Twice a Week Wed and Saturday @ 2100. warfarin (COUMADIN) 7.5 MG tablet, Take 7.5 mg by mouth Five times weekly Qgw-Bod-Bvbd-Fri-Sun @@ 2100  amLODIPine (NORVASC) 2.5 MG tablet, Take 1 tablet by mouth daily  guaiFENesin 400 MG tablet, Take 1 tablet by mouth 4 times daily as needed for Cough (Patient not taking: Reported on 4/15/2021)  ipratropium-albuterol (DUONEB) 0.5-2.5 (3) MG/3ML SOLN nebulizer solution, Inhale 3 mLs into the lungs every 6 hours as needed for Shortness of Breath  Respiratory Therapy Supplies (FULL KIT NEBULIZER SET) MISC, Use as directed with nebulized medication.   lisinopril (PRINIVIL;ZESTRIL) 20 MG tablet, Take 20 mg by mouth 2 times daily   pantoprazole (PROTONIX) 40 MG tablet, Take 40 mg by mouth 2 times daily  umeclidinium-vilanterol (ANORO ELLIPTA) 62.5-25 MCG/INH AEPB inhaler, Inhale 1 puff into the lungs daily  The patients home medications have been reviewed. Allergies: Carafate [sucralfate]    ------------------------- NURSING NOTES AND VITALS REVIEWED ---------------------------  Date / Time Roomed:  10/25/2021 11:18 AM  ED Bed Assignment:  0209/0209-A    The nursing notes within the ED encounter and vital signs as below have been reviewed. BP (!) 211/116   Pulse 130   Temp 97.8 °F (36.6 °C) (Axillary)   Resp (!) 38   Ht 5' 5\" (1.651 m)   Wt 195 lb (88.5 kg)   SpO2 99%   BMI 32.45 kg/m²   -------------------------------------------------- RESULTS / INTERVENTIONS -------------------------------------------------  All laboratory and radiology tests have been reviewed by this physician.     LABS:  Results for orders placed or performed during the hospital encounter of 10/25/21   Troponin   Result Value Ref Range    Troponin, High Sensitivity 16 (H) 0 - 9 ng/L   CBC Auto Differential   Result Value Ref Range    WBC 12.7 (H) 4.5 - 11.5 E9/L    RBC 4.70 3.50 - 5.50 E12/L    Hemoglobin 13.7 11.5 - 15.5 g/dL    Hematocrit 43.9 34.0 - 48.0 %    MCV 93.4 80.0 - 99.9 fL    MCH 29.1 26.0 - 35.0 pg    MCHC 31.2 (L) 32.0 - 34.5 %    RDW 14.0 11.5 - 15.0 fL    Platelets 358 988 - 721 E9/L    MPV 10.3 7.0 - 12.0 fL    Neutrophils % 89.7 (H) 43.0 - 80.0 %    Immature Granulocytes % 0.7 0.0 - 5.0 %    Lymphocytes % 5.4 (L) 20.0 - 42.0 %    Monocytes % 3.9 2.0 - 12.0 %    Eosinophils % 0.1 0.0 - 6.0 %    Basophils % 0.2 0.0 - 2.0 %    Neutrophils Absolute 11.37 (H) 1.80 - 7.30 E9/L    Immature Granulocytes # 0.09 E9/L    Lymphocytes Absolute 0.69 (L) 1.50 - 4.00 E9/L    Monocytes Absolute 0.49 0.10 - 0.95 E9/L    Eosinophils Absolute 0.01 (L) 0.05 - 0.50 E9/L    Basophils Absolute 0.03 0.00 - 0.20 E9/L   Comprehensive Metabolic Panel   Result Value Ref Range    Sodium 143 132 - 146 mmol/L    Potassium 3.2 (L) 3.5 - 5.0 mmol/L    Chloride 102 98 - 107 mmol/L    CO2 30 (H) 22 - 29 mmol/L    Anion Gap 11 7 - 16 mmol/L    Glucose 142 (H) 74 - 99 mg/dL    BUN 16 6 - 23 mg/dL    CREATININE 0.8 0.5 - 1.0 mg/dL    GFR Non-African American >60 >=60 mL/min/1.73    GFR African American >60     Calcium 9.6 8.6 - 10.2 mg/dL    Total Protein 7.5 6.4 - 8.3 g/dL    Albumin 4.0 3.5 - 5.2 g/dL    Total Bilirubin 0.3 0.0 - 1.2 mg/dL    Alkaline Phosphatase 127 (H) 35 - 104 U/L    ALT 8 0 - 32 U/L    AST 10 0 - 31 U/L   Brain Natriuretic Peptide   Result Value Ref Range    Pro-BNP 1,110 (H) 0 - 450 pg/mL   Magnesium   Result Value Ref Range    Magnesium 1.8 1.6 - 2.6 mg/dL   Urinalysis   Result Value Ref Range    Color, UA Yellow Straw/Yellow    Clarity, UA Clear Clear    Glucose, Ur Negative Negative mg/dL    Bilirubin Urine Negative Negative    Ketones, Urine Negative Negative mg/dL    Specific Gravity, UA 1.020 1.005 - 1.030    Blood, Urine TRACE-INTACT Negative    pH, UA 6.0 5.0 - 9.0    Protein, UA TRACE Negative mg/dL    Urobilinogen, Urine 0.2 <2.0 E.U./dL    Nitrite, Urine Negative Negative    Leukocyte Esterase, Urine Negative Negative   Protime-INR   Result Value Ref Range    Protime 19.2 (H) 9.3 - 12.4 sec    INR 1.8    Lactate, Sepsis   Result Value Ref Range    Lactic Acid, Sepsis 2.2 (H) 0.5 - 1.9 mmol/L   Lactate, Sepsis   Result Value Ref Range    Lactic Acid, Sepsis 1.4 0.5 - 1.9 mmol/L   Microscopic Urinalysis   Result Value Ref Range    WBC, UA 1-3 0 - 5 /HPF    RBC, UA 0-1 0 - 2 /HPF    Epithelial Cells, UA RARE /HPF    Bacteria, UA FEW (A) None Seen /HPF   APTT   Result Value Ref Range    aPTT 28.6 24.5 - 35.1 sec   Blood Gas, Arterial   Result Value Ref Range    Date Analyzed [id number]     Time Analyzed 2221     Source: Blood Arterial     pH, Blood Gas 7.057 (LL) 7.350 - 7.450    PCO2 117.4 (HH) 35.0 - 45.0 mmHg    PO2 191.9 (H) 75.0 - 100.0 mmHg    HCO3 32.3 (H) 22.0 - 26.0 mmol/L    B.E. -2.2 -3.0 - 3.0 mmol/L    O2 Sat 98.8 (H) 92.0 - 98.5 %    O2Hb 97.7 (H) 94.0 - 97.0 %    COHb 0.8 0.0 - 1.5 %    MetHb 0.3 0.0 - 1.5 %    O2 Content 21.9 mL/dL    HHb 1.2 0.0 - 5.0 % (NORVASC) tablet 2.5 mg (2.5 mg Oral Not Given 10/25/21 1709)   ipratropium-albuterol (DUONEB) nebulizer solution 3 mL (has no administration in time range)   lisinopril (PRINIVIL;ZESTRIL) tablet 20 mg (20 mg Oral Given 10/25/21 2032)   pantoprazole (PROTONIX) tablet 40 mg (40 mg Oral Given 10/25/21 2031)   warfarin (COUMADIN) tablet 10 mg (10 mg Oral Given 10/25/21 2031)   warfarin (COUMADIN) tablet 7.5 mg (has no administration in time range)   polyethylene glycol (GLYCOLAX) packet 17 g (has no administration in time range)   acetaminophen (TYLENOL) tablet 650 mg (has no administration in time range)     Or   acetaminophen (TYLENOL) suppository 650 mg (has no administration in time range)   0.9 % sodium chloride infusion ( IntraVENous New Bag 10/25/21 1810)   trimethobenzamide (TIGAN) injection 200 mg (has no administration in time range)   cefTRIAXone (ROCEPHIN) 1,000 mg in sterile water 10 mL IV syringe (has no administration in time range)     And   doxycycline hyclate (VIBRAMYCIN) capsule 100 mg (100 mg Oral Given 10/25/21 2031)   potassium chloride (KLOR-CON M) extended release tablet 40 mEq (40 mEq Oral Given 10/25/21 1735)     Or   potassium bicarb-citric acid (EFFER-K) effervescent tablet 40 mEq ( Oral See Alternative 10/25/21 1735)     Or   potassium chloride 10 mEq/100 mL IVPB (Peripheral Line) ( IntraVENous See Alternative 10/25/21 1735)   Arformoterol Tartrate (BROVANA) nebulizer solution 15 mcg (15 mcg Nebulization Given 10/25/21 1942)   influenza A&B vaccine (FLUAD QUADRIVALENT) injection 0.5 mL (has no administration in time range)   hydrALAZINE (APRESOLINE) injection 10 mg (10 mg IntraVENous Given 10/25/21 2213)   labetalol (NORMODYNE;TRANDATE) injection 10 mg (has no administration in time range)   midazolam PF (VERSED) injection 5 mg (has no administration in time range)   propofol injection (40 mcg/kg/min × 88.5 kg IntraVENous New Bag 10/25/21 2322)   fentaNYL 5 mcg/ml in 0.9%  ml infusion (75 mcg/hr IntraVENous New Bag 10/25/21 4563)   methylPREDNISolone sodium (SOLU-MEDROL) injection 60 mg (has no administration in time range)   ipratropium-albuterol (DUONEB) nebulizer solution 1 ampule (has no administration in time range)   cefTRIAXone (ROCEPHIN) 1,000 mg in sterile water 10 mL IV syringe (0 mg IntraVENous Stopped 10/25/21 1250)   doxycycline (VIBRAMYCIN) 100 mg in dextrose 5 % 100 mL IVPB (0 mg IntraVENous Stopped 10/25/21 1413)   0.9 % sodium chloride bolus (0 mLs IntraVENous Stopped 10/25/21 1423)   potassium chloride (KLOR-CON M) extended release tablet 40 mEq (40 mEq Oral Given 10/25/21 1422)       Procedures:  No procedures performed. --------------------------------- PROGRESS NOTES / ADDITIONAL PROVIDER NOTES ---------------------------------  Consultations:  As outlined below. ED Course:  ED Course as of Oct 26 0012   Mon Oct 25, 2021   1405 Case was discussed with internal medicine who agrees to admit the patient for further evaluation and management. [ML]      ED Course User Index  [ML] Maria Luisa Chopra DO       12:12 AM: All results were discussed with the patient and/or their surrogate and I have provided specific details regarding the plan to admit the patient. The patient was seen in the emergency department by myself and the assigned attending physician, Jordyn Arciniega DO, who agreed with the assessment, plan and decision to admit as laid out herein. The patient and/or family verbalized an understanding and agreement with the plan for admission and all questions were answered to the highest degree of accuracy possible based on the current information available. The patient was without objective evidence of hemodynamic instability and was therefore deemed to be in stable condition at the time of transport.     This patient's ED course included: a personal history and physicial examination, re-evaluation prior to disposition, multiple bedside re-evaluations, IV medications, cardiac monitoring and continuous pulse oximetry    MDM:  The patient presented with several days of worsening SOB and new productive cough. On arrival, the patient was hypertensive with remaining VS wnl. EKG and physical exam were as documented above. Labs were remarkable for an elevated proBNP and hypokalemia. Covid done three days ago was negative. CXR revealed a new LLL infiltrate and the patient was given doses of rocephin and doxycycline. Given her presentation and worsening symptoms, the decision was made to admit her to the hospital for further evaluation and management. The patient was stable at the time of her disposition. Diagnosis:  1. Pneumonia of left lower lobe due to infectious organism    2. Hypokalemia        Disposition:  Patient's disposition: Admit to telemetry  Patient's condition is stable. This patient was seen, examined and treated with Hilary Macario DO. All pertinent aspects of the patient's care were discussed with the attending physician.        Ge Blackman DO  Resident  10/26/21 7535

## 2021-10-26 NOTE — CONSULTS
Critical Care Admit/Consult Note         Patient - Rissa Wilkinson   MRN -  [id number]   [place] # - [de-identified]   - 1940      Date of Admission -  10/25/2021 11:18 AM  Date of evaluation -  10/26/2021  0209/0209-A   Hospital Day - 1            ADMIT/CONSULT DETAILS     Reason for Admit/Consult   Severe hypercapnic respiratory failure     Consulting Mannie Pozo MD  Primary Care Physician - Michael Hernandez MD         HPI   The patient is a [de-identified] y.o. female with significant past medical history of HTN, DVT, PE ,COPD on 2 L O2 OTC at home, FAISAL and anemia transferred to the ICU due to acute hypercapnic respiratory failure 2/2 PNA. Patient was seen in the ED 10/25 for worsening SOB and new productive cough. She was also seen previously on 10/22 for similar symptoms and was discharged home after negative workup. ED Course: Labwork remarkable for an elevated proBNP and hypokalemia. CXR revealed a new LLL infiltrate and the patient was given doses of rocephin and doxycycline. Admitted to medical floor for further work-up/management. 10/25 RRT called due to severe respiratory distress. Patient noted to be tachypnic with RR >40 with accessory muscle use. SpO2 >90% on NRB but ABG showing 7.057/117.4/191.9/32. 3.         Past Medical History         Diagnosis Date    COPD (chronic obstructive pulmonary disease) ([facility] [address]     DVT (deep venous thrombosis) ([facility] [address]     Hypertension     Pulmonary embolism (HCC)         Past Surgical History           Procedure Laterality Date    CARPAL TUNNEL RELEASE Right     CHOLECYSTECTOMY      HYSTERECTOMY      partial    MASTECTOMY, BILATERAL         Current Medications   Current Medications    sodium chloride flush  5-40 mL IntraVENous 2 times per day    amLODIPine  2.5 mg Oral Daily    lisinopril  20 mg Oral BID    pantoprazole  40 mg Oral BID    warfarin  10 mg Oral Once per day on     warfarin  7.5 mg Oral Once per day on Sun Tue Wed Fri Sat    cefTRIAXone (ROCEPHIN) IV  1,000 mg IntraVENous Q24H    And    doxycycline hyclate  100 mg Oral 2 times per day    Arformoterol Tartrate  15 mcg Nebulization BID    influenza virus vaccine  0.5 mL IntraMUSCular Prior to discharge    methylPREDNISolone  60 mg IntraVENous Q6H    ipratropium-albuterol  1 ampule Inhalation Q4H WA     sodium chloride flush, sodium chloride, ipratropium-albuterol, polyethylene glycol, acetaminophen **OR** acetaminophen, trimethobenzamide, potassium chloride **OR** potassium alternative oral replacement **OR** potassium chloride, hydrALAZINE, labetalol  IV Drips/Infusions   lactated ringers 100 mL/hr at 10/26/21 0528    sodium chloride      sodium chloride 50 mL/hr at 10/25/21 1810    propofol 40 mcg/kg/min (10/26/21 0640)    fentaNYL 5 mcg/ml in 0.9%  ml infusion 125 mcg/hr (10/26/21 0317)     Home Medications  Medications Prior to Admission: predniSONE (DELTASONE) 20 MG tablet, Take 1 tablet by mouth 2 times daily for 5 days  ferrous sulfate (IRON 325) 325 (65 Fe) MG tablet, Take 1 tablet by mouth daily (with breakfast)  warfarin (COUMADIN) 10 MG tablet, Take 1 tablet by mouth Twice a Week Wed and Saturday @ 2100. warfarin (COUMADIN) 7.5 MG tablet, Take 7.5 mg by mouth Five times weekly Fzm-Hgx-Lnwe-Fri-Sun @@ 2100  amLODIPine (NORVASC) 2.5 MG tablet, Take 1 tablet by mouth daily  guaiFENesin 400 MG tablet, Take 1 tablet by mouth 4 times daily as needed for Cough (Patient not taking: Reported on 4/15/2021)  ipratropium-albuterol (DUONEB) 0.5-2.5 (3) MG/3ML SOLN nebulizer solution, Inhale 3 mLs into the lungs every 6 hours as needed for Shortness of Breath  Respiratory Therapy Supplies (FULL KIT NEBULIZER SET) MISC, Use as directed with nebulized medication.   lisinopril (PRINIVIL;ZESTRIL) 20 MG tablet, Take 20 mg by mouth 2 times daily   pantoprazole (PROTONIX) 40 MG tablet, Take 40 mg by mouth 2 times daily  umeclidinium-vilanterol (ANORO ELLIPTA) 62.5-25 MCG/INH AEPB inhaler, Inhale 1 puff into the lungs daily    Diet/Nutrition   Diet NPO    Allergies   Carafate [sucralfate]    Social History   Tobacco   reports that she has been smoking cigarettes. She started smoking about 68 years ago. She has a 16.25 pack-year smoking history. She has never used smokeless tobacco.    Alcohol     reports previous alcohol use. Occupational history :    Family History         Problem Relation Age of Onset    Heart Attack Mother     Other Brother         blood clots       Sleep History   Sleep history, unknown if on CPAP at home     ROS     REVIEW OF SYSTEMS:  Review of systems not obtained due to patient factors - intubation    Lines and Devices    PIV x 2     Mechanical Ventilation Data   VENT SETTINGS (Comprehensive)  Vent Information  $Ventilation: $Subsequent Day  Skin Assessment: Clean, dry, & intact  Equipment ID: [id number]  Vent Type: [phone]  Vent Mode: AC/VC  Vt Ordered: 340 mL  Rate Set: 18 bmp  Peak Flow: 55 L/min  Pressure Support: 0 cmH20  FiO2 : 50 %  SpO2: 96 %  SpO2/FiO2 ratio: 192  Sensitivity: 3  PEEP/CPAP: 5  I Time/ I Time %: 0 s  Humidification Source: Heated wire  Humidification Temp: 37  Humidification Temp Measured: 37  Additional Respiratory  Assessments  Pulse: 73  Resp: 26  SpO2: 96 %  Position: Semi-Mcdonald's  Humidification Source: Heated wire  Humidification Temp: 37  Oral Care: Mouth swabbed, Mouth moisturizer, Mouth suctioned, Suction toothette  Subglottic Suction Done?: Yes    ABG  Lab Results   Component Value Date    PH 7.428 10/26/2021    PCO2 36.8 10/26/2021    PO2 78.6 10/26/2021    HCO3 23.8 10/26/2021    O2SAT 95.2 10/26/2021     Lab Results   Component Value Date    MODE AC 10/26/2021           Vitals    height is 5' 5\" (1.651 m) and weight is 195 lb (88.5 kg). Her bladder temperature is 97.6 °F (36.4 °C). Her blood pressure is 102/51 (abnormal) and her pulse is 73. Her respiration is 26 and oxygen saturation is 96%.        Temperature Range: Temp: 97.6 °F (36.4 °C) Temp  Av °F (36.7 °C)  Min: 97 °F (36.1 °C)  Max: 99.1 °F (37.3 °C)  BP Range:  Systolic (41QJA), BWJ:932 , Min:102 , ZGQ:925     Diastolic (27ZRF), TFU:77, Min:51, Max:118    Pulse Range: Pulse  Av.9  Min: 72  Max: 149  Respiration Range: Resp  Av.4  Min: 17  Max: 42  Current Pulse Ox[de-identified]  SpO2: 96 %  24HR Pulse Ox Range:  SpO2  Av.7 %  Min: 92 %  Max: 100 %  Oxygen Amount and Delivery: O2 Flow Rate (L/min): 2 L/min      I/O (24 Hours)    Patient Vitals for the past 8 hrs:   BP Temp Temp src Pulse Resp SpO2   10/26/21 0600 (!) 102/51 -- -- 73 26 96 %   10/26/21 0500 107/60 -- -- 72 18 97 %   10/26/21 0442 -- -- -- 74 18 99 %   10/26/21 0400 (!) 112/58 -- -- 78 17 99 %   10/26/21 0300 (!) 102/55 -- -- 79 18 98 %   10/26/21 0200 (!) 114/58 -- -- 89 17 95 %   10/26/21 0100 137/62 -- -- 100 18 96 %   10/26/21 0033 -- -- -- 106 18 100 %   10/26/21 0000 (!) 152/63 97.6 °F (36.4 °C) Bladder 116 17 100 %       Intake/Output Summary (Last 24 hours) at 10/26/2021 0716  Last data filed at 10/26/2021 0300  Gross per 24 hour   Intake 600 ml   Output 65 ml   Net 535 ml     I/O last 3 completed shifts: In: 600 [IV BQLALWXAP:526]  Out: 72 [Urine:65]   Date 10/26/21 0000 - 10/26/21 2359   Shift 1071-8093 4625-1213 5163-4687 24 Hour Total   INTAKE   Shift Total(mL/kg)       OUTPUT   Urine(mL/kg/hr) 65   65   Shift Total(mL/kg) 65(0.7)   65(0.7)   Weight (kg) 88.5 88.5 88.5 88.5     Patient Vitals for the past 96 hrs (Last 3 readings):   Weight   10/25/21 0938 195 lb (88.5 kg)         Drains/Tubes Outputs  OG Tube   Exam         PHYSICAL EXAM:  General appearance - Intubated and sedated. Appears stated age.    Mental status - unable to elicit, patient is sedated   Eyes -pupils equal, round and reactive, sclera anicteric  Ears - bilateral external ears appear normal   Nose - normal and patent, no erythema, discharge or polyps  Mouth - ETT tube in place   Neck - supple, no JVD, no significant adenopathy, bilateral symmetric anterior adenopathy  Chest -  Diminished throughout, equal chest rise on vent, no wheezes, rales or rhonchi, symmetric air entry  Heart - normal rate, regular rhythm, normal S1, S2, no murmurs, rubs, clicks or gallops  Abdomen - soft, nondistended, no masses or organomegaly  Neurological -sedated   Extremities - No clubbing, no edema, no cellulitis. Positive peripheral pulses, equal bilaterally. Skin - normal coloration and turgor, no rashes, no suspicious skin lesions noted      Data   Old records and images have been reviewed    Lab Results   CBC     Lab Results   Component Value Date    WBC 12.7 10/25/2021    RBC 4.70 10/25/2021    HGB 13.7 10/25/2021    HCT 43.9 10/25/2021     10/25/2021    MCV 93.4 10/25/2021    MCH 29.1 10/25/2021    MCHC 31.2 10/25/2021    RDW 14.0 10/25/2021    LYMPHOPCT 5.4 10/25/2021    MONOPCT 3.9 10/25/2021    BASOPCT 0.2 10/25/2021    MONOSABS 0.49 10/25/2021    LYMPHSABS 0.69 10/25/2021    EOSABS 0.01 10/25/2021    BASOSABS 0.03 10/25/2021       BMP   Lab Results   Component Value Date     10/25/2021    K 3.5 10/25/2021    K 3.3 10/22/2021     10/25/2021    CO2 26 10/25/2021    BUN 19 10/25/2021    CREATININE 0.8 10/25/2021    GLUCOSE 180 10/25/2021    CALCIUM 8.6 10/25/2021       LFTS  Lab Results   Component Value Date    ALKPHOS 124 10/25/2021    ALT 15 10/25/2021    AST 19 10/25/2021    PROT 6.7 10/25/2021    BILITOT 0.2 10/25/2021    LABALBU 3.6 10/25/2021       INR  Recent Labs     10/25/21  1140   PROTIME 19.2*   INR 1.8       APTT  Recent Labs     10/25/21  1140   APTT 28.6       Lactic Acid  Lab Results   Component Value Date    LACTA 1.6 10/25/2021    LACTA 1.6 10/22/2021        BNP   No results for input(s): BNP in the last 72 hours. Cultures     No results for input(s): BC in the last 72 hours. No results for input(s): Atha Cierra in the last 72 hours. No results for input(s): LABURIN in the last 72 hours.           Radiology CXR    10/26/21  Narrative   EXAMINATION:   TWO XRAY VIEWS OF THE CHEST       10/25/2021 10:25 am       COMPARISON:   10/22/2021       HISTORY:   ORDERING SYSTEM PROVIDED HISTORY: sob   TECHNOLOGIST PROVIDED HISTORY:   Reason for exam:->sob       FINDINGS:   The cardiac silhouette is within normal limits. The right lung is clear. There is an infiltrate seen within the left lung base. This is seen on both   the PA and lateral views. The left upper lobe is clear. Impression   1. Left lung base infiltrate         Assessment     1. Severe hypercapnic respiratory failure requiring mechanical ventilation   2. Acute on Chronic COPD exacerbation   3. PNA with LLL infiltrate vs. pulmonary edema   4. Dyspnea and respiratory abnormalities  5. Leukocytosis  6. Essential hypertension  7. Current Tobacco abuse  8. History of pulmonary embolism  9. History of DVT  10. Chronic anemia  11. Iron deficiency anemia  12. FAISAL     SYSTEMS ASSESSMENT    Neuro   Intubated and sedated   On fentanyl and propofol   Wean as tolerated   Cont to monitor     Respiratory   Acute respiratory failure with hypoxia and hypercapnia requiring mechanical ventilation   LLL pneumonia with left lung base infiltrate on CXR   Acute on chronic COPD exacerbation   Oxygen dependent, 2 L OTC the clock at home     Decrease Solumedrol to 40 mg Q12H     Vent day 1  Wean oxygen as tolerated.  Keep O2 sat 90-92%    Cardiovascular   Hx of HTN   Elevated BNP, 1110  Elevated troponin 16, repeat 37 with delta 21  ECHO 01/2021 normal EF 60%      Gastrointestinal   GI prophylaxis - 40 mg BID   Start TF + Bowel regimen     Renal   Renal function is stable   Monitor electrolytes, replete as needed      Infectious Disease   LLL pneumonia   Leukocytosis, 12.7  Afebrile   Rocephin 1g IV daily   Doxycycline 100 mg Q12H   Follow CRP, Sed rate and Procalcitonin     Hematology/Oncology   Hgb stable   Monitor H/H   Transfuse if Hgb <7     Hx of DVT/PE on chronic anticoagulation   Cont Coumadin  Switch to Lovenox for AC    Lovenox 90 mg SQ BID    Endocrine   Monitor BGs   Permissive hyperglycemia 140-180   Consider SSI/Lantus if BGs elevated    Social/Spiritual/DNR/Other   FULL CODE     The patient was seen and evaluated by myself and Dr. Gillie Ormond, DO PGY-1  10/26/2021  5:56 PM                 I personally saw, examined and provided care for the patient. Radiographs, labs and medication list were reviewed by me independently. Review of Residents documentation was conducted and revisions were made as appropriate. I agree with the above documented exam, problem list and plan of care.         CCT excluding procedures >30 minutes    Cecily Solomon, DO

## 2021-10-26 NOTE — PROGRESS NOTES
Event Note    · Will continue to follow peripherally while in the ICU. Please advise of transfer. ·   Hemant Garcia. Yahir Mckoy M.D., F.C.C.P.     [facility], [address] [place] [address], [address] [place] [address]

## 2021-10-26 NOTE — PROGRESS NOTES
continuosly decreasing sedation throughout the day. Pt. Is now able to open eyes and has minimal movement of all extremities, however, she is not moving anything to command. Repositioned q2hrs. Skin integrity intact. Remains restrained for her safety.

## 2021-10-27 ENCOUNTER — APPOINTMENT (OUTPATIENT)
Dept: GENERAL RADIOLOGY | Age: 81
DRG: 871 | End: 2021-10-27
Payer: MEDICARE

## 2021-10-27 LAB
AADO2: 200.9 MMHG
ALBUMIN SERPL-MCNC: 3.1 G/DL (ref 3.5–5.2)
ALP BLD-CCNC: 96 U/L (ref 35–104)
ALT SERPL-CCNC: 13 U/L (ref 0–32)
ANION GAP SERPL CALCULATED.3IONS-SCNC: 11 MMOL/L (ref 7–16)
AST SERPL-CCNC: 12 U/L (ref 0–31)
B.E.: -0.9 MMOL/L (ref -3–3)
BASOPHILS ABSOLUTE: 0.01 E9/L (ref 0–0.2)
BASOPHILS RELATIVE PERCENT: 0.1 % (ref 0–2)
BILIRUB SERPL-MCNC: <0.2 MG/DL (ref 0–1.2)
BUN BLDV-MCNC: 28 MG/DL (ref 6–23)
CALCIUM SERPL-MCNC: 9 MG/DL (ref 8.6–10.2)
CHLORIDE BLD-SCNC: 105 MMOL/L (ref 98–107)
CO2: 27 MMOL/L (ref 22–29)
COHB: 0.6 % (ref 0–1.5)
CREAT SERPL-MCNC: 0.8 MG/DL (ref 0.5–1)
CRITICAL: NORMAL
DATE ANALYZED: NORMAL
DATE OF COLLECTION: NORMAL
EOSINOPHILS ABSOLUTE: 0 E9/L (ref 0.05–0.5)
EOSINOPHILS RELATIVE PERCENT: 0 % (ref 0–6)
FIO2: 50 %
GFR AFRICAN AMERICAN: >60
GFR NON-AFRICAN AMERICAN: >60 ML/MIN/1.73
GLUCOSE BLD-MCNC: 150 MG/DL (ref 74–99)
HCO3: 24 MMOL/L (ref 22–26)
HCT VFR BLD CALC: 37.9 % (ref 34–48)
HEMOGLOBIN: 11.9 G/DL (ref 11.5–15.5)
HHB: 2.6 % (ref 0–5)
IMMATURE GRANULOCYTES #: 0.08 E9/L
IMMATURE GRANULOCYTES %: 0.7 % (ref 0–5)
LAB: NORMAL
LYMPHOCYTES ABSOLUTE: 1.05 E9/L (ref 1.5–4)
LYMPHOCYTES RELATIVE PERCENT: 9.4 % (ref 20–42)
Lab: NORMAL
MAGNESIUM: 1.8 MG/DL (ref 1.6–2.6)
MCH RBC QN AUTO: 29.8 PG (ref 26–35)
MCHC RBC AUTO-ENTMCNC: 31.4 % (ref 32–34.5)
MCV RBC AUTO: 95 FL (ref 80–99.9)
METHB: 0 % (ref 0–1.5)
MODE: AC
MONOCYTES ABSOLUTE: 0.63 E9/L (ref 0.1–0.95)
MONOCYTES RELATIVE PERCENT: 5.7 % (ref 2–12)
MRSA CULTURE ONLY: NORMAL
NEUTROPHILS ABSOLUTE: 9.35 E9/L (ref 1.8–7.3)
NEUTROPHILS RELATIVE PERCENT: 84.1 % (ref 43–80)
O2 CONTENT: 18.6 ML/DL
O2 SATURATION: 97.4 % (ref 92–98.5)
O2HB: 96.8 % (ref 94–97)
OPERATOR ID: 3342
PATIENT TEMP: 37 C
PCO2: 41.1 MMHG (ref 35–45)
PDW BLD-RTO: 14.4 FL (ref 11.5–15)
PEEP/CPAP: 5 CMH2O
PFO2: 1.94 MMHG/%
PH BLOOD GAS: 7.38 (ref 7.35–7.45)
PHOSPHORUS: 4.6 MG/DL (ref 2.5–4.5)
PLATELET # BLD: 346 E9/L (ref 130–450)
PMV BLD AUTO: 10.3 FL (ref 7–12)
PO2: 96.9 MMHG (ref 75–100)
POTASSIUM SERPL-SCNC: 4 MMOL/L (ref 3.5–5)
RBC # BLD: 3.99 E12/L (ref 3.5–5.5)
RI(T): 207 %
RR MECHANICAL: 14 B/MIN
SODIUM BLD-SCNC: 143 MMOL/L (ref 132–146)
SOURCE, BLOOD GAS: NORMAL
THB: 13.6 G/DL (ref 11.5–16.5)
TIME ANALYZED: 534
TOTAL PROTEIN: 5.9 G/DL (ref 6.4–8.3)
VT MECHANICAL: 340 ML
WBC # BLD: 11.1 E9/L (ref 4.5–11.5)

## 2021-10-27 PROCEDURE — 2500000003 HC RX 250 WO HCPCS: Performed by: INTERNAL MEDICINE

## 2021-10-27 PROCEDURE — 85025 COMPLETE CBC W/AUTO DIFF WBC: CPT

## 2021-10-27 PROCEDURE — 6360000002 HC RX W HCPCS: Performed by: PHYSICIAN ASSISTANT

## 2021-10-27 PROCEDURE — 36600 WITHDRAWAL OF ARTERIAL BLOOD: CPT

## 2021-10-27 PROCEDURE — 6370000000 HC RX 637 (ALT 250 FOR IP): Performed by: INTERNAL MEDICINE

## 2021-10-27 PROCEDURE — 71045 X-RAY EXAM CHEST 1 VIEW: CPT

## 2021-10-27 PROCEDURE — 83735 ASSAY OF MAGNESIUM: CPT

## 2021-10-27 PROCEDURE — 2580000003 HC RX 258: Performed by: EMERGENCY MEDICINE

## 2021-10-27 PROCEDURE — 80053 COMPREHEN METABOLIC PANEL: CPT

## 2021-10-27 PROCEDURE — 94640 AIRWAY INHALATION TREATMENT: CPT

## 2021-10-27 PROCEDURE — C9113 INJ PANTOPRAZOLE SODIUM, VIA: HCPCS | Performed by: INTERNAL MEDICINE

## 2021-10-27 PROCEDURE — 2580000003 HC RX 258: Performed by: PHYSICIAN ASSISTANT

## 2021-10-27 PROCEDURE — 2580000003 HC RX 258: Performed by: INTERNAL MEDICINE

## 2021-10-27 PROCEDURE — 94003 VENT MGMT INPAT SUBQ DAY: CPT

## 2021-10-27 PROCEDURE — 6370000000 HC RX 637 (ALT 250 FOR IP): Performed by: PHYSICIAN ASSISTANT

## 2021-10-27 PROCEDURE — 2700000000 HC OXYGEN THERAPY PER DAY

## 2021-10-27 PROCEDURE — 6360000002 HC RX W HCPCS: Performed by: INTERNAL MEDICINE

## 2021-10-27 PROCEDURE — 84100 ASSAY OF PHOSPHORUS: CPT

## 2021-10-27 PROCEDURE — 94660 CPAP INITIATION&MGMT: CPT

## 2021-10-27 PROCEDURE — 82805 BLOOD GASES W/O2 SATURATION: CPT

## 2021-10-27 PROCEDURE — 36415 COLL VENOUS BLD VENIPUNCTURE: CPT

## 2021-10-27 PROCEDURE — 6360000002 HC RX W HCPCS: Performed by: STUDENT IN AN ORGANIZED HEALTH CARE EDUCATION/TRAINING PROGRAM

## 2021-10-27 PROCEDURE — 2000000000 HC ICU R&B

## 2021-10-27 RX ADMIN — HYDRALAZINE HYDROCHLORIDE 10 MG: 20 INJECTION INTRAMUSCULAR; INTRAVENOUS at 13:16

## 2021-10-27 RX ADMIN — CEFTRIAXONE 1000 MG: 1 INJECTION, POWDER, FOR SOLUTION INTRAMUSCULAR; INTRAVENOUS at 12:52

## 2021-10-27 RX ADMIN — SODIUM CHLORIDE, PRESERVATIVE FREE 10 ML: 5 INJECTION INTRAVENOUS at 21:00

## 2021-10-27 RX ADMIN — IPRATROPIUM BROMIDE AND ALBUTEROL SULFATE 1 AMPULE: .5; 2.5 SOLUTION RESPIRATORY (INHALATION) at 21:23

## 2021-10-27 RX ADMIN — IPRATROPIUM BROMIDE AND ALBUTEROL SULFATE 1 AMPULE: .5; 2.5 SOLUTION RESPIRATORY (INHALATION) at 17:40

## 2021-10-27 RX ADMIN — DOXYCYCLINE HYCLATE 100 MG: 100 CAPSULE ORAL at 09:17

## 2021-10-27 RX ADMIN — ARFORMOTEROL TARTRATE 15 MCG: 15 SOLUTION RESPIRATORY (INHALATION) at 08:20

## 2021-10-27 RX ADMIN — METHYLPREDNISOLONE SODIUM SUCCINATE 40 MG: 40 INJECTION, POWDER, LYOPHILIZED, FOR SOLUTION INTRAMUSCULAR; INTRAVENOUS at 20:59

## 2021-10-27 RX ADMIN — CHLORHEXIDINE GLUCONATE 15 ML: 1.2 RINSE ORAL at 09:18

## 2021-10-27 RX ADMIN — ENOXAPARIN SODIUM 90 MG: 100 INJECTION SUBCUTANEOUS at 20:59

## 2021-10-27 RX ADMIN — ENOXAPARIN SODIUM 90 MG: 100 INJECTION SUBCUTANEOUS at 09:17

## 2021-10-27 RX ADMIN — SODIUM CHLORIDE, PRESERVATIVE FREE 10 ML: 5 INJECTION INTRAVENOUS at 09:19

## 2021-10-27 RX ADMIN — AMLODIPINE BESYLATE 2.5 MG: 2.5 TABLET ORAL at 09:17

## 2021-10-27 RX ADMIN — METHYLPREDNISOLONE SODIUM SUCCINATE 40 MG: 40 INJECTION, POWDER, LYOPHILIZED, FOR SOLUTION INTRAMUSCULAR; INTRAVENOUS at 09:18

## 2021-10-27 RX ADMIN — IPRATROPIUM BROMIDE AND ALBUTEROL SULFATE 1 AMPULE: .5; 2.5 SOLUTION RESPIRATORY (INHALATION) at 08:20

## 2021-10-27 RX ADMIN — LISINOPRIL 20 MG: 20 TABLET ORAL at 09:17

## 2021-10-27 RX ADMIN — PROPOFOL 35 MCG/KG/MIN: 10 INJECTION, EMULSION INTRAVENOUS at 03:15

## 2021-10-27 RX ADMIN — PROPOFOL 25 MCG/KG/MIN: 10 INJECTION, EMULSION INTRAVENOUS at 08:08

## 2021-10-27 RX ADMIN — ARFORMOTEROL TARTRATE 15 MCG: 15 SOLUTION RESPIRATORY (INHALATION) at 21:24

## 2021-10-27 RX ADMIN — IPRATROPIUM BROMIDE AND ALBUTEROL SULFATE 1 AMPULE: .5; 2.5 SOLUTION RESPIRATORY (INHALATION) at 12:30

## 2021-10-27 RX ADMIN — PANTOPRAZOLE SODIUM 40 MG: 40 INJECTION, POWDER, FOR SOLUTION INTRAVENOUS at 09:17

## 2021-10-27 RX ADMIN — Medication 50 MCG/HR: at 03:15

## 2021-10-27 RX ADMIN — LISINOPRIL 20 MG: 20 TABLET ORAL at 17:57

## 2021-10-27 RX ADMIN — DOXYCYCLINE HYCLATE 100 MG: 100 CAPSULE ORAL at 20:58

## 2021-10-27 ASSESSMENT — PAIN SCALES - GENERAL
PAINLEVEL_OUTOF10: 0

## 2021-10-27 ASSESSMENT — PULMONARY FUNCTION TESTS
PIF_VALUE: 13
PIF_VALUE: 22
PIF_VALUE: 25
PIF_VALUE: 20
PIF_VALUE: 20
PIF_VALUE: 35
PIF_VALUE: 24
PIF_VALUE: 13
PIF_VALUE: 22
PIF_VALUE: 14
PIF_VALUE: 20
PIF_VALUE: 21
PIF_VALUE: 13
PIF_VALUE: 20

## 2021-10-27 NOTE — PROGRESS NOTES
Restraints removed all sedation disconnected, pt. Awake and alert able to follow commands. Pt. Extubated to 5L NC tolerating well.

## 2021-10-27 NOTE — PLAN OF CARE
Problem: Falls - Risk of:  Goal: Will remain free from falls  Description: Will remain free from falls  Outcome: Met This Shift  Goal: Absence of physical injury  Description: Absence of physical injury  Outcome: Met This Shift     Problem: Non-Violent Restraints  Goal: No harm/injury to patient while restraints in use  Outcome: Met This Shift  Goal: Patient's dignity will be maintained  Outcome: Met This Shift     Problem: Skin Integrity:  Goal: Will show no infection signs and symptoms  Description: Will show no infection signs and symptoms  Outcome: Met This Shift  Goal: Absence of new skin breakdown  Description: Absence of new skin breakdown  Outcome: Met This Shift

## 2021-10-27 NOTE — PROGRESS NOTES
Pt. Sitting on edge of bed eating dinner. Tolerating well. Ate 75% of her meal oxygen saturation 95-97% daughter in to visit.

## 2021-10-27 NOTE — PROGRESS NOTES
Critical Care Team - Daily Progress Note      Date and time: 10/27/2021 12:12 PM  Patient's name:  Clare Cruz  Medical Record Number: [id number]  Patient's account/billing number: [de-identified]  Patient's YOB: 1940  Age: 80 y.o. Date of Admission: 10/25/2021 11:18 AM  Length of stay during current admission: 2      Primary Care Physician: Charis Tsai MD  ICU Attending Physician: Dr. Franky Machado Status: Full Code    Reason for ICU admission: Severe hypercapnic respiratory failure    HPI   The patient is a 80 y.o. female with significant past medical history of HTN, DVT, PE ,COPD on 2 L O2 OTC at home, FAISAL and anemia transferred to the ICU due to acute hypercapnic respiratory failure 2/2 PNA. Patient was seen in the ED 10/25 for worsening SOB and new productive cough. She was also seen previously on 10/22 for similar symptoms and was discharged home after negative workup. Labwork remarkable for an elevated proBNP and hypokalemia. CXR revealed a new LLL infiltrate and the patient was given doses of rocephin and doxycycline. Admitted to medical floor for further work-up/management. 10/25 RRT called due to severe respiratory distress. Patient noted to be tachypnic with RR >40 with accessory muscle use. SpO2 >90% on NRB but ABG showing 7.057/117.4/191.9/32.3. OVERNIGHT EVENTS:         10/26- Remains intubated and sedated. No acute events reported overnight. Her clinical condition appears improved. Patient wakes and can follow commands well. Urine output poor overnight, only approximately 200 cc over 8 hours.    Past Medical History         Diagnosis Date    COPD (chronic obstructive pulmonary disease) ([address]     DVT (deep venous thrombosis) ([address]     Hypertension     Pulmonary embolism ([address]         Past Surgical History           Procedure Laterality Date    CARPAL TUNNEL RELEASE Right     CHOLECYSTECTOMY      HYSTERECTOMY      partial    MASTECTOMY, BILATERAL  1988       Current Medications   Current Medications    methylPREDNISolone  40 mg IntraVENous Q12H    enoxaparin  1 mg/kg SubCUTAneous BID    pantoprazole  40 mg IntraVENous Daily    And    sodium chloride (PF)  10 mL IntraVENous Daily    chlorhexidine  15 mL Mouth/Throat BID    senna  5 mL Oral Nightly    docusate  100 mg Oral Daily    sodium chloride flush  5-40 mL IntraVENous 2 times per day    amLODIPine  2.5 mg Oral Daily    lisinopril  20 mg Oral BID    cefTRIAXone (ROCEPHIN) IV  1,000 mg IntraVENous Q24H    And    doxycycline hyclate  100 mg Oral 2 times per day    Arformoterol Tartrate  15 mcg Nebulization BID    influenza virus vaccine  0.5 mL IntraMUSCular Prior to discharge    ipratropium-albuterol  1 ampule Inhalation Q4H WA     sodium chloride flush, sodium chloride, ipratropium-albuterol, polyethylene glycol, acetaminophen **OR** acetaminophen, trimethobenzamide, potassium chloride **OR** potassium alternative oral replacement **OR** potassium chloride, hydrALAZINE, labetalol  IV Drips/Infusions   sodium chloride      propofol 25 mcg/kg/min (10/27/21 0808)    fentaNYL 5 mcg/ml in 0.9%  ml infusion 50 mcg/hr (10/27/21 0315)     Home Medications  Medications Prior to Admission: predniSONE (DELTASONE) 20 MG tablet, Take 1 tablet by mouth 2 times daily for 5 days  ferrous sulfate (IRON 325) 325 (65 Fe) MG tablet, Take 1 tablet by mouth daily (with breakfast)  warfarin (COUMADIN) 10 MG tablet, Take 1 tablet by mouth Twice a Week Wed and Saturday @ 2100.   warfarin (COUMADIN) 7.5 MG tablet, Take 7.5 mg by mouth Five times weekly Lpu-Cpy-Pmko-Fri-Sun @@ 2100  amLODIPine (NORVASC) 2.5 MG tablet, Take 1 tablet by mouth daily  guaiFENesin 400 MG tablet, Take 1 tablet by mouth 4 times daily as needed for Cough (Patient not taking: Reported on 4/15/2021)  ipratropium-albuterol (DUONEB) 0.5-2.5 (3) MG/3ML SOLN nebulizer solution, Inhale 3 mLs into the lungs every 6 hours as needed for Shortness of Breath  Respiratory Therapy Supplies (FULL KIT NEBULIZER SET) MISC, Use as directed with nebulized medication. lisinopril (PRINIVIL;ZESTRIL) 20 MG tablet, Take 20 mg by mouth 2 times daily   pantoprazole (PROTONIX) 40 MG tablet, Take 40 mg by mouth 2 times daily  umeclidinium-vilanterol (ANORO ELLIPTA) 62.5-25 MCG/INH AEPB inhaler, Inhale 1 puff into the lungs daily    Diet/Nutrition   Diet NPO  ADULT TUBE FEEDING; Orogastric; Peptide Based High Protein; Continuous; 10; Yes; 10; Q 4 hours; 40; 30; Q 4 hours    Allergies   Carafate [sucralfate]    Social History   Tobacco   reports that she has been smoking cigarettes. She started smoking about 68 years ago. She has a 16.25 pack-year smoking history. She has never used smokeless tobacco.    Alcohol     reports previous alcohol use.     Occupational history :    Family History         Problem Relation Age of Onset    Heart Attack Mother     Other Brother         blood clots       Sleep History   Sleep history, unknown if on CPAP at home     ROS     REVIEW OF SYSTEMS:  Review of systems not obtained due to patient factors - intubation    Lines and Devices    PIV x 2     Mechanical Ventilation Data   VENT SETTINGS (Comprehensive)  Vent Information  $Ventilation: $Subsequent Day  Skin Assessment: Clean, dry, & intact  Equipment ID: [id number]  Vent Type: [phone]  Vent Mode: PS (HR 85, spO2 96%, RR 13, RSBI 29)  Vt Ordered: 0 mL  Rate Set: 0 bmp  Peak Flow: 0 L/min  Pressure Support: 8 cmH20  FiO2 : 40 %  SpO2: 94 %  SpO2/FiO2 ratio: 235  Sensitivity: 3  PEEP/CPAP: 5  I Time/ I Time %: 0 s  Humidification Source: Heated wire  Humidification Temp: 37  Humidification Temp Measured: 36.8  Circuit Condensation: Drained  Additional Respiratory  Assessments  Pulse: 82  Resp: 11  SpO2: 94 %  Position: Semi-[employer]  Humidification Source: Heated wire  Humidification Temp: 37  Circuit Condensation: Drained  Oral Care: Mouth suctioned, Suction toothette, Teeth brushed  Subglottic Suction Done?: Yes    ABG  Lab Results   Component Value Date    PH 7.385 10/27/2021    PCO2 41.1 10/27/2021    PO2 96.9 10/27/2021    HCO3 24.0 10/27/2021    O2SAT 97.4 10/27/2021     Lab Results   Component Value Date    MODE AC 10/27/2021           Vitals    height is 5' 5\" (1.651 m) and weight is 217 lb 2.5 oz (98.5 kg). Her bladder temperature is 98.2 °F (36.8 °C). Her blood pressure is 156/69 (abnormal) and her pulse is 82. Her respiration is 11 and oxygen saturation is 94%. Temperature Range: Temp: 98.2 °F (36.8 °C) Temp  Av.1 °F (36.7 °C)  Min: 97.8 °F (36.6 °C)  Max: 98.2 °F (36.8 °C)  BP Range:  Systolic (46KIF), XJO:452 , Min:110 , SF     Diastolic (34MZN), MQP:79, Min:54, Max:92    Pulse Range: Pulse  Av.6  Min: 66  Max: 87  Respiration Range: Resp  Av.5  Min: 11  Max: 16  Current Pulse Ox[de-identified]  SpO2: 94 %  24HR Pulse Ox Range:  SpO2  Av.6 %  Min: 94 %  Max: 99 %  Oxygen Amount and Delivery: O2 Flow Rate (L/min): 2 L/min      I/O (24 Hours)    Patient Vitals for the past 8 hrs:   BP Pulse Resp SpO2   10/27/21 0900 (!) 156/69 82 11 94 %   10/27/21 0836 -- 83 12 97 %   10/27/21 0821 -- 77 14 98 %   10/27/21 0800 (!) 143/66 81 14 99 %   10/27/21 0700 (!) 140/70 73 14 97 %   10/27/21 0500 (!) 113/57 72 12 96 %       Intake/Output Summary (Last 24 hours) at 10/27/2021 1211  Last data filed at 10/27/2021 0550  Gross per 24 hour   Intake 3785.1 ml   Output 605 ml   Net 3180.1 ml     I/O last 3 completed shifts:   In: 3785.1 [I.V.:3303.1; NG/GT:482]  Out: 605 [Urine:605]   Date 10/27/21 0000 - 10/27/21 2359   Shift 8015-5118 9931-8465 3014-3306 24 Hour Total   INTAKE   I.V.(mL/kg) 1093(11.1)   1093(11.1)   NG/GT(mL/kg) 322(3.3)   322(3.3)   Shift Total(mL/kg) 9300(97.9)   1415(14.4)   OUTPUT   Urine(mL/kg/hr) 205(0.3)   205   Shift Total(mL/kg) 205(2.1)   205(2.1)   Weight (kg) 98.5 98.5 98.5 98.5     Patient Vitals for the past 96 hrs (Last 3 readings):   Weight   10/27/21 0400 217 lb 2.5 oz (98.5 kg)   10/25/21 0938 195 lb (88.5 kg)         Drains/Tubes Outputs  OG Tube   Exam         PHYSICAL EXAM:  General appearance - Intubated and sedated. Appears stated age. Mental status - patient wakes, opens eyes and tracks. Can follow commands fairly well. Eyes -pupils equal, round and reactive, sclera anicteric  Ears - bilateral external ears appear normal   Nose - normal and patent, no erythema, discharge or polyps  Mouth - ETT tube in place   Neck - supple, no JVD, no significant adenopathy, bilateral symmetric anterior adenopathy  Chest -  Diminished throughout, equal chest rise on vent, no wheezes, rales or rhonchi, symmetric air entry  Heart - normal rate, regular rhythm, normal S1, S2, no murmurs, rubs, clicks or gallops  Abdomen - soft, nondistended, no masses or organomegaly  Neurological -wakes and follows commands well, no gross motor deficits    Extremities - No clubbing, no edema, no cellulitis. Positive peripheral pulses, equal bilaterally.    Skin - normal coloration and turgor, no rashes, no suspicious skin lesions noted      Data   Old records and images have been reviewed    Lab Results   CBC     Lab Results   Component Value Date    WBC 11.1 10/27/2021    RBC 3.99 10/27/2021    HGB 11.9 10/27/2021    HCT 37.9 10/27/2021     10/27/2021    MCV 95.0 10/27/2021    MCH 29.8 10/27/2021    MCHC 31.4 10/27/2021    RDW 14.4 10/27/2021    LYMPHOPCT 9.4 10/27/2021    MONOPCT 5.7 10/27/2021    BASOPCT 0.1 10/27/2021    MONOSABS 0.63 10/27/2021    LYMPHSABS 1.05 10/27/2021    EOSABS 0.00 10/27/2021    BASOSABS 0.01 10/27/2021       BMP   Lab Results   Component Value Date     10/27/2021    K 4.0 10/27/2021    K 3.3 10/22/2021     10/27/2021    CO2 27 10/27/2021    BUN 28 10/27/2021    CREATININE 0.8 10/27/2021    GLUCOSE 150 10/27/2021    CALCIUM 9.0 10/27/2021       LFTS  Lab Results   Component Value Date    ALKPHOS 96 10/27/2021    ALT 13 10/27/2021    AST 12 10/27/2021    PROT 5.9 10/27/2021 BILITOT <0.2 10/27/2021    LABALBU 3.1 10/27/2021       INR  Recent Labs     10/25/21  1140   PROTIME 19.2*   INR 1.8       APTT  Recent Labs     10/25/21  1140   APTT 28.6       Lactic Acid  Lab Results   Component Value Date    LACTA 1.6 10/25/2021    LACTA 1.6 10/22/2021        BNP   No results for input(s): BNP in the last 72 hours. Cultures     Recent Labs     10/25/21  1140   BC 24 Hours no growth     Recent Labs     10/25/21  1140   BLOODCULT2 24 Hours no growth       Recent Labs     10/26/21  0115   [address] not present, incubation continues             Radiology   CXR    10/26/21  Narrative   EXAMINATION:   TWO XRAY VIEWS OF THE CHEST       10/25/2021 10:25 am       COMPARISON:   10/22/2021       HISTORY:   ORDERING SYSTEM PROVIDED HISTORY: sob   TECHNOLOGIST PROVIDED HISTORY:   Reason for exam:->sob       FINDINGS:   The cardiac silhouette is within normal limits. The right lung is clear. There is an infiltrate seen within the left lung base. This is seen on both   the PA and lateral views. The left upper lobe is clear. Impression   1. Left lung base infiltrate         Assessment     1. Severe hypercapnic respiratory failure requiring mechanical ventilation   2. Acute on Chronic COPD exacerbation   3. PNA with LLL infiltrate vs. pulmonary edema   4. Dyspnea and respiratory abnormalities  5. Leukocytosis  6. Essential hypertension  7. Current Tobacco abuse  8. History of pulmonary embolism  9. History of DVT  10. Chronic anemia  11. Iron deficiency anemia  12.  FAISAL     SYSTEMS ASSESSMENT    Neuro   Intubated and sedated   On fentanyl and propofol   Wean as tolerated   Cont to monitor     Respiratory   Acute respiratory failure with hypoxia and hypercapnia requiring mechanical ventilation   LLL pneumonia with left lung base infiltrate on CXR   Acute on chronic COPD exacerbation   Oxygen dependent, 2 L OTC the clock at home   On CPAP at home, will order BiPAP   Decrease Solumedrol to 40 mg Q12H     Vent day 2  Weaning trial, patient successfully extubated     Cardiovascular   Hx of HTN   Elevated BNP, 1110  Elevated troponin 16, repeat 37 with delta 21  ECHO 01/2021 normal EF 60%   Fluid overload? Hold fluids      Gastrointestinal   GI prophylaxis - 40 mg BID   Start TF + Bowel regimen     Renal   Renal function is stable   Monitor electrolytes, replete as needed      Infectious Disease   LLL pneumonia   Leukocytosis, 12.7  Afebrile   Rocephin 1g IV daily   Doxycycline 100 mg Q12H   Follow CRP, Sed rate and Procalcitonin     Hematology/Oncology   Hgb stable   Monitor H/H   Transfuse if Hgb <7     Hx of DVT/PE on chronic anticoagulation   Cont Coumadin  Switch to Lovenox for AC    Lovenox 90 mg SQ BID    Endocrine   Monitor BGs   Permissive hyperglycemia 140-180   Consider SSI/Lantus if BGs elevated    Social/Spiritual/DNR/Other   FULL CODE     The patient was seen and evaluated by myself and Dr. Ameya Graves DO PGY-1  10/27/2021  12:11 PM                 I personally saw, examined and provided care for the patient. Radiographs, labs and medication list were reviewed by me independently. Review of Residents documentation was conducted and revisions were made as appropriate. I agree with the above documented exam, problem list and plan of care.         CCT excluding procedures >30 minutes    Tonio Stout DO

## 2021-10-27 NOTE — PROGRESS NOTES
Sedation weaned pt. Able to follow commands. Pt. Placed on pressure support of 12, tolerating well. Tube feeds placed on hold.

## 2021-10-27 NOTE — PATIENT CARE CONFERENCE
Intensive Care Daily Quality Rounding Checklist        ICU Team Members:      ICU Day #: NUMBER: 3     Intubation Date: October 25     Ventilator Day #: NUMBER: 3     Central Line Insertion Date:  N/A                                                    Day #: N/A      Arterial Line Insertion Date: N/A                              Day #: N/A     Temporary Hemodialysis Catheter Insertion Date: N/A                             Day # N/A     DVT Prophylaxis: full dose Lovenox    GI Prophylaxis: Protonix          Arreguin Catheter Insertion Date: October 25                                        Day #: 3                             Continued need (if yes, reason documented and discussed with physician): yes, critical care, I/O     Skin Issues/ Wounds and ordered treatment discussed on rounds: None     Goals/ Plans for the Day: Monitor labs and vitals.  Breathing trails and extubate if able

## 2021-10-28 ENCOUNTER — APPOINTMENT (OUTPATIENT)
Dept: GENERAL RADIOLOGY | Age: 81
DRG: 871 | End: 2021-10-28
Payer: MEDICARE

## 2021-10-28 LAB
ALBUMIN SERPL-MCNC: 3.2 G/DL (ref 3.5–5.2)
ALP BLD-CCNC: 98 U/L (ref 35–104)
ALT SERPL-CCNC: 19 U/L (ref 0–32)
ANION GAP SERPL CALCULATED.3IONS-SCNC: 11 MMOL/L (ref 7–16)
AST SERPL-CCNC: 20 U/L (ref 0–31)
BASOPHILS ABSOLUTE: 0.02 E9/L (ref 0–0.2)
BASOPHILS RELATIVE PERCENT: 0.2 % (ref 0–2)
BILIRUB SERPL-MCNC: <0.2 MG/DL (ref 0–1.2)
BUN BLDV-MCNC: 28 MG/DL (ref 6–23)
CALCIUM SERPL-MCNC: 9 MG/DL (ref 8.6–10.2)
CHLORIDE BLD-SCNC: 106 MMOL/L (ref 98–107)
CO2: 26 MMOL/L (ref 22–29)
CREAT SERPL-MCNC: 0.7 MG/DL (ref 0.5–1)
CULTURE, RESPIRATORY: NORMAL
EOSINOPHILS ABSOLUTE: 0 E9/L (ref 0.05–0.5)
EOSINOPHILS RELATIVE PERCENT: 0 % (ref 0–6)
GFR AFRICAN AMERICAN: >60
GFR NON-AFRICAN AMERICAN: >60 ML/MIN/1.73
GLUCOSE BLD-MCNC: 142 MG/DL (ref 74–99)
HCT VFR BLD CALC: 39.1 % (ref 34–48)
HEMOGLOBIN: 12.2 G/DL (ref 11.5–15.5)
IMMATURE GRANULOCYTES #: 0.11 E9/L
IMMATURE GRANULOCYTES %: 1 % (ref 0–5)
LYMPHOCYTES ABSOLUTE: 1.11 E9/L (ref 1.5–4)
LYMPHOCYTES RELATIVE PERCENT: 10 % (ref 20–42)
MAGNESIUM: 2 MG/DL (ref 1.6–2.6)
MCH RBC QN AUTO: 29.6 PG (ref 26–35)
MCHC RBC AUTO-ENTMCNC: 31.2 % (ref 32–34.5)
MCV RBC AUTO: 94.9 FL (ref 80–99.9)
MONOCYTES ABSOLUTE: 0.65 E9/L (ref 0.1–0.95)
MONOCYTES RELATIVE PERCENT: 5.9 % (ref 2–12)
NEUTROPHILS ABSOLUTE: 9.22 E9/L (ref 1.8–7.3)
NEUTROPHILS RELATIVE PERCENT: 82.9 % (ref 43–80)
PDW BLD-RTO: 14.6 FL (ref 11.5–15)
PHOSPHORUS: 3.8 MG/DL (ref 2.5–4.5)
PLATELET # BLD: 368 E9/L (ref 130–450)
PMV BLD AUTO: 10.4 FL (ref 7–12)
POTASSIUM SERPL-SCNC: 4.2 MMOL/L (ref 3.5–5)
RBC # BLD: 4.12 E12/L (ref 3.5–5.5)
SMEAR, RESPIRATORY: NORMAL
SODIUM BLD-SCNC: 143 MMOL/L (ref 132–146)
TOTAL PROTEIN: 6.3 G/DL (ref 6.4–8.3)
URINE CULTURE, ROUTINE: NORMAL
WBC # BLD: 11.1 E9/L (ref 4.5–11.5)

## 2021-10-28 PROCEDURE — 2580000003 HC RX 258: Performed by: INTERNAL MEDICINE

## 2021-10-28 PROCEDURE — 6360000002 HC RX W HCPCS: Performed by: INTERNAL MEDICINE

## 2021-10-28 PROCEDURE — 94640 AIRWAY INHALATION TREATMENT: CPT

## 2021-10-28 PROCEDURE — 84100 ASSAY OF PHOSPHORUS: CPT

## 2021-10-28 PROCEDURE — 80053 COMPREHEN METABOLIC PANEL: CPT

## 2021-10-28 PROCEDURE — 36415 COLL VENOUS BLD VENIPUNCTURE: CPT

## 2021-10-28 PROCEDURE — 94660 CPAP INITIATION&MGMT: CPT

## 2021-10-28 PROCEDURE — 6370000000 HC RX 637 (ALT 250 FOR IP): Performed by: PHYSICIAN ASSISTANT

## 2021-10-28 PROCEDURE — 2700000000 HC OXYGEN THERAPY PER DAY

## 2021-10-28 PROCEDURE — 6360000002 HC RX W HCPCS: Performed by: STUDENT IN AN ORGANIZED HEALTH CARE EDUCATION/TRAINING PROGRAM

## 2021-10-28 PROCEDURE — 71045 X-RAY EXAM CHEST 1 VIEW: CPT

## 2021-10-28 PROCEDURE — 6370000000 HC RX 637 (ALT 250 FOR IP): Performed by: INTERNAL MEDICINE

## 2021-10-28 PROCEDURE — 85025 COMPLETE CBC W/AUTO DIFF WBC: CPT

## 2021-10-28 PROCEDURE — 2580000003 HC RX 258: Performed by: EMERGENCY MEDICINE

## 2021-10-28 PROCEDURE — C9113 INJ PANTOPRAZOLE SODIUM, VIA: HCPCS | Performed by: INTERNAL MEDICINE

## 2021-10-28 PROCEDURE — 1200000000 HC SEMI PRIVATE

## 2021-10-28 PROCEDURE — 83735 ASSAY OF MAGNESIUM: CPT

## 2021-10-28 PROCEDURE — 6360000002 HC RX W HCPCS: Performed by: PHYSICIAN ASSISTANT

## 2021-10-28 RX ORDER — WARFARIN SODIUM 7.5 MG/1
7.5 TABLET ORAL
Status: DISCONTINUED | OUTPATIENT
Start: 2021-10-29 | End: 2021-11-04 | Stop reason: HOSPADM

## 2021-10-28 RX ORDER — WARFARIN SODIUM 10 MG/1
10 TABLET ORAL
Status: DISCONTINUED | OUTPATIENT
Start: 2021-10-28 | End: 2021-11-04 | Stop reason: HOSPADM

## 2021-10-28 RX ADMIN — PANTOPRAZOLE SODIUM 40 MG: 40 INJECTION, POWDER, FOR SOLUTION INTRAVENOUS at 08:47

## 2021-10-28 RX ADMIN — SODIUM CHLORIDE, PRESERVATIVE FREE 10 ML: 5 INJECTION INTRAVENOUS at 08:47

## 2021-10-28 RX ADMIN — IPRATROPIUM BROMIDE AND ALBUTEROL SULFATE 1 AMPULE: .5; 2.5 SOLUTION RESPIRATORY (INHALATION) at 09:07

## 2021-10-28 RX ADMIN — WARFARIN SODIUM 10 MG: 10 TABLET ORAL at 17:49

## 2021-10-28 RX ADMIN — METHYLPREDNISOLONE SODIUM SUCCINATE 40 MG: 40 INJECTION, POWDER, LYOPHILIZED, FOR SOLUTION INTRAMUSCULAR; INTRAVENOUS at 08:48

## 2021-10-28 RX ADMIN — IPRATROPIUM BROMIDE AND ALBUTEROL SULFATE 1 AMPULE: .5; 2.5 SOLUTION RESPIRATORY (INHALATION) at 12:10

## 2021-10-28 RX ADMIN — AMLODIPINE BESYLATE 2.5 MG: 2.5 TABLET ORAL at 08:48

## 2021-10-28 RX ADMIN — LISINOPRIL 20 MG: 20 TABLET ORAL at 08:48

## 2021-10-28 RX ADMIN — ARFORMOTEROL TARTRATE 15 MCG: 15 SOLUTION RESPIRATORY (INHALATION) at 09:07

## 2021-10-28 RX ADMIN — SODIUM CHLORIDE, PRESERVATIVE FREE 10 ML: 5 INJECTION INTRAVENOUS at 08:57

## 2021-10-28 RX ADMIN — ENOXAPARIN SODIUM 90 MG: 100 INJECTION SUBCUTANEOUS at 20:54

## 2021-10-28 RX ADMIN — DOXYCYCLINE HYCLATE 100 MG: 100 CAPSULE ORAL at 08:48

## 2021-10-28 RX ADMIN — ENOXAPARIN SODIUM 90 MG: 100 INJECTION SUBCUTANEOUS at 08:48

## 2021-10-28 RX ADMIN — HYDRALAZINE HYDROCHLORIDE 10 MG: 20 INJECTION INTRAMUSCULAR; INTRAVENOUS at 18:33

## 2021-10-28 RX ADMIN — IPRATROPIUM BROMIDE AND ALBUTEROL SULFATE 1 AMPULE: .5; 2.5 SOLUTION RESPIRATORY (INHALATION) at 16:15

## 2021-10-28 RX ADMIN — METHYLPREDNISOLONE SODIUM SUCCINATE 40 MG: 40 INJECTION, POWDER, LYOPHILIZED, FOR SOLUTION INTRAMUSCULAR; INTRAVENOUS at 20:54

## 2021-10-28 RX ADMIN — SODIUM CHLORIDE, PRESERVATIVE FREE 10 ML: 5 INJECTION INTRAVENOUS at 20:54

## 2021-10-28 RX ADMIN — LISINOPRIL 20 MG: 20 TABLET ORAL at 17:49

## 2021-10-28 ASSESSMENT — PAIN SCALES - GENERAL
PAINLEVEL_OUTOF10: 0

## 2021-10-28 NOTE — CARE COORDINATION
Continue ICU. Pt extubated on 10/27. Remains on 3l/nc. Pt lives with daughter, has home o2 through [facility]. Called and spoke with daughter, Rudi Calvert. Pt stays on 1st floor and has wheeled walker. Rudi Calvert is not sure what discharge plan she wants at this time. Discussed [facility] vs home with home care. Will wait for pt to be stable, therapies and progress.  Will follow-o

## 2021-10-28 NOTE — PATIENT CARE CONFERENCE
Intensive Care Daily Quality Rounding Checklist        ICU Team Members: Dr. Calli Quispe, residents, charge RN, bedside RN and respiratory therapy     ICU Day #: NUMBER: 4     Intubation Date: N/A     Ventilator Day #: N/A  (Extubated 10/27)     Central Line Insertion Date:  N/A                                                    Day #: N/A      Arterial Line Insertion Date: N/A                              Day #: N/A     Temporary Hemodialysis Catheter Insertion Date: N/A                             Day # N/A     DVT Prophylaxis: full dose Lovenox    GI Prophylaxis: EMMFOCNI          Arreguin Catheter Insertion Date: October 25                                        Day #: 4                             Continued need (if yes, reason documented and discussed with physician): yes, critical care, I/O     Skin Issues/ Wounds and ordered treatment discussed on rounds: None     Goals/ Plans for the Day: Monitor labs and vitals, check for transfer to [facility]

## 2021-10-28 NOTE — PLAN OF CARE
Problem: Falls - Risk of:  Goal: Will remain free from falls  Description: Will remain free from falls  Outcome: Met This Shift  Goal: Absence of physical injury  Description: Absence of physical injury  Outcome: Met This Shift     Problem: Skin Integrity:  Goal: Absence of new skin breakdown  Description: Absence of new skin breakdown  Outcome: Met This Shift     Problem: Falls - Risk of:  Goal: Will remain free from falls  Description: Will remain free from falls  Outcome: Met This Shift  Goal: Absence of physical injury  Description: Absence of physical injury  Outcome: Met This Shift

## 2021-10-28 NOTE — PROGRESS NOTES
Report called to Awa RN on 5 [facility] for patient to be transferred to room 510. All pertinent information disclosed during nurse to nurse report. Dr. Renetta Buchanan notified of transfer to [facility]. Patient updated on upcoming transfer to room 510. All needs met at this time. Awaiting transport.

## 2021-10-28 NOTE — PROGRESS NOTES
Critical Care Team - Daily Progress Note      Date and time: 10/28/2021 1:20 PM  Patient's name:  Verl Opitz  Medical Record Number: [id number]  Patient's account/billing number: [de-identified]  Patient's YOB: 1940  Age: 80 y.o. Date of Admission: 10/25/2021 11:18 AM  Length of stay during current admission: 3      Primary Care Physician: Lolis Norris MD  ICU Attending Physician: Dr. Jose Tamez    Code Status: Full Code    Reason for ICU admission: Severe hypercapnic respiratory failure    HPI   The patient is a 80 y.o. female with significant past medical history of HTN, DVT, PE ,COPD on 2 L O2 OTC at home, FAISAL and anemia transferred to the ICU due to acute hypercapnic respiratory failure 2/2 PNA. Patient was seen in the ED 10/25 for worsening SOB and new productive cough. She was also seen previously on 10/22 for similar symptoms and was discharged home after negative workup. Labwork remarkable for an elevated proBNP and hypokalemia. CXR revealed a new LLL infiltrate and the patient was given doses of rocephin and doxycycline. Admitted to medical floor for further work-up/management. 10/25 RRT called due to severe respiratory distress. Patient noted to be tachypnic with RR >40 with accessory muscle use. SpO2 >90% on NRB but ABG showing 7.057/117.4/191.9/32.3. OVERNIGHT EVENTS:         10/26- Remains intubated and sedated. No acute events reported overnight. Her clinical condition appears improved. Patient wakes and can follow commands well. Urine output poor overnight, only approximately 200 cc over 8 hours  10/27- Successfully extubated yesterday. No acute event overnight. Patient states she feels much better this morning. No pain anywhere. Endorses shortness of breath but is improved compared to presentation.  Afebrile   Past Medical History         Diagnosis Date    COPD (chronic obstructive pulmonary disease) ([address]     DVT (deep venous thrombosis) ([facility])     Hypertension Pulmonary embolism (H[facility])         Past Surgical History           Procedure Laterality Date    CARPAL TUNNEL RELEASE Right     CHOLECYSTECTOMY      HYSTERECTOMY      partial    MASTECTOMY, BILATERAL  1988       Current Medications   Current Medications    warfarin  10 mg Oral Once per day on Mon Thu    [START ON 10/29/2021] warfarin  7.5 mg Oral Once per day on Sun Tue Wed Fri Sat    methylPREDNISolone  40 mg IntraVENous Q12H    enoxaparin  1 mg/kg SubCUTAneous BID    pantoprazole  40 mg IntraVENous Daily    And    sodium chloride (PF)  10 mL IntraVENous Daily    senna  5 mL Oral Nightly    docusate  100 mg Oral Daily    sodium chloride flush  5-40 mL IntraVENous 2 times per day    amLODIPine  2.5 mg Oral Daily    lisinopril  20 mg Oral BID    Arformoterol Tartrate  15 mcg Nebulization BID    influenza virus vaccine  0.5 mL IntraMUSCular Prior to discharge    ipratropium-albuterol  1 ampule Inhalation Q4H WA     sodium chloride flush, sodium chloride, ipratropium-albuterol, polyethylene glycol, acetaminophen **OR** acetaminophen, trimethobenzamide, potassium chloride **OR** potassium alternative oral replacement **OR** potassium chloride, hydrALAZINE, labetalol  IV Drips/Infusions   sodium chloride       Home Medications  Medications Prior to Admission: predniSONE (DELTASONE) 20 MG tablet, Take 1 tablet by mouth 2 times daily for 5 days  ferrous sulfate (IRON 325) 325 (65 Fe) MG tablet, Take 1 tablet by mouth daily (with breakfast)  warfarin (COUMADIN) 10 MG tablet, Take 1 tablet by mouth Twice a Week Wed and Saturday @ 2100.   warfarin (COUMADIN) 7.5 MG tablet, Take 7.5 mg by mouth Five times weekly Ckq-Ooj-Qqkt-Fri-Sun @@ 2100  amLODIPine (NORVASC) 2.5 MG tablet, Take 1 tablet by mouth daily  guaiFENesin 400 MG tablet, Take 1 tablet by mouth 4 times daily as needed for Cough (Patient not taking: Reported on 4/15/2021)  ipratropium-albuterol (DUONEB) 0.5-2.5 (3) MG/3ML SOLN nebulizer solution, Inhale 3 mLs into the lungs every 6 hours as needed for Shortness of Breath  Respiratory Therapy Supplies (FULL KIT NEBULIZER SET) MISC, Use as directed with nebulized medication. lisinopril (PRINIVIL;ZESTRIL) 20 MG tablet, Take 20 mg by mouth 2 times daily   pantoprazole (PROTONIX) 40 MG tablet, Take 40 mg by mouth 2 times daily  umeclidinium-vilanterol (ANORO ELLIPTA) 62.5-25 MCG/INH AEPB inhaler, Inhale 1 puff into the lungs daily    Diet/Nutrition   ADULT TUBE FEEDING; Orogastric; Peptide Based High Protein; Continuous; 10; Yes; 10; Q 4 hours; 40; 30; Q 4 hours  ADULT DIET; Regular; No Added Salt (3-4 gm)    Allergies   Carafate [sucralfate]    Social History   Tobacco   reports that she has been smoking cigarettes. She started smoking about 68 years ago. She has a 16.25 pack-year smoking history. She has never used smokeless tobacco.    Alcohol     reports previous alcohol use.     Occupational history :    Family History         Problem Relation Age of Onset    Heart Attack Mother     Other Brother         blood clots       Sleep History   Sleep history, unknown if on CPAP at home     ROS     REVIEW OF SYSTEMS:  Review of systems not obtained due to patient factors - intubation    Lines and Devices    PIV x 2     Mechanical Ventilation Data   VENT SETTINGS (Comprehensive)  Vent Information  $Ventilation: $Subsequent Day  Skin Assessment: Clean, dry, & intact  Equipment ID: [id number]  Vent Type: [id number]  Vent Mode: PS (HR 85, spO2 96%, RR 13, RSBI 29)  Vt Ordered: 0 mL  Rate Set: 0 bmp  Peak Flow: 0 L/min  Pressure Support: 8 cmH20  FiO2 : 40 %  SpO2: 97 %  SpO2/FiO2 ratio: 1940  Sensitivity: 3  PEEP/CPAP: 5  I Time/ I Time %: 0 s  Humidification Source: Heated wire  Humidification Temp: 37  Humidification Temp Measured: 36.8  Circuit Condensation: Drained  Mask Type: Full face mask  Mask Size: Medium  Additional Respiratory  Assessments  Pulse: 90  Resp: (!) 31  SpO2: 97 %  Position: Semi-[employer]  Humidification Source: Heated wire  Humidification Temp: 37  Circuit Condensation: Drained  Oral Care: Mouth swabbed  Subglottic Suction Done?: Yes    ABG  Lab Results   Component Value Date    PH 7.385 10/27/2021    PCO2 41.1 10/27/2021    PO2 96.9 10/27/2021    HCO3 24.0 10/27/2021    O2SAT 97.4 10/27/2021     Lab Results   Component Value Date    MODE AC 10/27/2021           Vitals    height is 5' 5\" (1.651 m) and weight is 215 lb 9.8 oz (97.8 kg). Her bladder temperature is 98.4 °F (36.9 °C). Her blood pressure is 149/70 (abnormal) and her pulse is 90. Her respiration is 31 (abnormal) and oxygen saturation is 97%. Temperature Range: Temp: 98.4 °F (36.9 °C) Temp  Av.8 °F (37.1 °C)  Min: 98.4 °F (36.9 °C)  Max: 99.2 °F (37.3 °C)  BP Range:  Systolic (35APM), MSV:738 , Min:140 , EEV:980     Diastolic (55MEN), FRB:08, Min:57, Max:103    Pulse Range: Pulse  Av.4  Min: 76  Max: 123  Respiration Range: Resp  Av.7  Min: 14  Max: 31  Current Pulse Ox[de-identified]  SpO2: 97 %  24HR Pulse Ox Range:  SpO2  Av.6 %  Min: 92 %  Max: 98 %  Oxygen Amount and Delivery: O2 Flow Rate (L/min): 3 L/min      I/O (24 Hours)    Patient Vitals for the past 8 hrs:   BP Pulse Resp SpO2 Weight   10/28/21 1210 -- -- (!) 31 -- --   10/28/21 0909 -- -- 23 97 % --   10/28/21 0908 -- -- 25 96 % --   10/28/21 0600 (!) 149/70 90 22 97 % 215 lb 9.8 oz (97.8 kg)       Intake/Output Summary (Last 24 hours) at 10/28/2021 1320  Last data filed at 10/28/2021 0600  Gross per 24 hour   Intake 1092 ml   Output 1875 ml   Net -783 ml     I/O last 3 completed shifts:   In: 1092 [I.V.:944; NG/GT:148]  Out: 187 [Urine:1875]   Date 10/28/21 0000 - 10/28/21 2359   Shift 5564-0473 9197-7974 7457-6807 24 Hour Total   INTAKE   Shift Total(mL/kg)       OUTPUT   Urine(mL/kg/hr) 400(0.5)   400   Shift Total(mL/kg) 400(4.1)   400(4.1)   Weight (kg) 97.8 97.8 97.8 97.8     Patient Vitals for the past 96 hrs (Last 3 readings):   Weight   10/28/21 0600 215 lb 9.8 oz (97.8 kg)   10/27/21 0400 217 lb 2.5 oz (98.5 kg)   10/25/21 0938 195 lb (88.5 kg)         Drains/Tubes Outputs  OG Tube   Exam         PHYSICAL EXAM:  General appearance - No acute distress. Appears stated age. Mental status - Awake, alert and oriented x 3, at baseline   Eyes -pupils equal, round and reactive, sclera anicteric  Ears - bilateral external ears appear normal   Nose - normal and patent, no erythema, discharge or polyps  Mouth - oral mucosa is slightly dry, no lesions noted  Neck - supple, no JVD, no significant adenopathy, bilateral symmetric anterior adenopathy  Chest -  Diminished throughout, equal chest rise on vent, no wheezes, rales or rhonchi, symmetric air entry  Heart - normal rate, regular rhythm, normal S1, S2, no murmurs, rubs, clicks or gallops  Abdomen - soft, nondistended, no masses or organomegaly  Neurological -No focal neurological deficits, no sensory deficits, gross motor intact   Extremities - No clubbing, no edema, no cellulitis. Positive peripheral pulses, equal bilaterally.    Skin - normal coloration and turgor, no rashes, no suspicious skin lesions noted      Data   Old records and images have been reviewed    Lab Results   CBC     Lab Results   Component Value Date    WBC 11.1 10/28/2021    RBC 4.12 10/28/2021    HGB 12.2 10/28/2021    HCT 39.1 10/28/2021     10/28/2021    MCV 94.9 10/28/2021    MCH 29.6 10/28/2021    MCHC 31.2 10/28/2021    RDW 14.6 10/28/2021    LYMPHOPCT 10.0 10/28/2021    MONOPCT 5.9 10/28/2021    BASOPCT 0.2 10/28/2021    MONOSABS 0.65 10/28/2021    LYMPHSABS 1.11 10/28/2021    EOSABS 0.00 10/28/2021    BASOSABS 0.02 10/28/2021       BMP   Lab Results   Component Value Date     10/28/2021    K 4.2 10/28/2021    K 3.3 10/22/2021     10/28/2021    CO2 26 10/28/2021    BUN 28 10/28/2021    CREATININE 0.7 10/28/2021    GLUCOSE 142 10/28/2021    CALCIUM 9.0 10/28/2021       LFTS  Lab Results   Component Value Date    ALKPHOS 98 10/28/2021    ALT 19 10/28/2021 AST 20 10/28/2021    PROT 6.3 10/28/2021    BILITOT <0.2 10/28/2021    LABALBU 3.2 10/28/2021       INR  No results for input(s): PROTIME, INR in the last 72 hours. APTT  No results for input(s): APTT in the last 72 hours. Lactic Acid  Lab Results   Component Value Date    LACTA 1.6 10/25/2021    LACTA 1.6 10/22/2021        BNP   No results for input(s): BNP in the last 72 hours. Cultures     No results for input(s): BC in the last 72 hours. No results for input(s): Nir Puff in the last 72 hours. Recent Labs     10/26/21  0115   [address] not present             Radiology   CXR    10/26/21  Narrative   EXAMINATION:   TWO XRAY VIEWS OF THE CHEST       10/25/2021 10:25 am       COMPARISON:   10/22/2021       HISTORY:   ORDERING SYSTEM PROVIDED HISTORY: sob   TECHNOLOGIST PROVIDED HISTORY:   Reason for exam:->sob       FINDINGS:   The cardiac silhouette is within normal limits. The right lung is clear. There is an infiltrate seen within the left lung base. This is seen on both   the PA and lateral views. The left upper lobe is clear. Impression   1. Left lung base infiltrate         Assessment     1. Severe hypercapnic respiratory failure requiring mechanical ventilation   2. Acute on Chronic COPD exacerbation   3. PNA with LLL infiltrate vs. pulmonary edema   4. Dyspnea and respiratory abnormalities  5. Leukocytosis  6. Essential hypertension  7. Current Tobacco abuse  8. History of pulmonary embolism  9. History of DVT  10. Chronic anemia  11. Iron deficiency anemia  12.  FAISAL     SYSTEMS ASSESSMENT    Neuro   Intubated and sedated   On fentanyl and propofol   Wean as tolerated   Cont to monitor     Respiratory   Acute respiratory failure with hypoxia and hypercapnia requiring mechanical ventilation   LLL pneumonia with left lung base infiltrate on CXR   Acute on chronic COPD exacerbation   Oxygen dependent, 2 L OTC the clock at home   On CPAP at home, will order BiPAP Solumedrol to 40 mg Q12H   Continue breathing treatments   Intubated before arrival to unit, extubated 10/27    Cardiovascular   Hx of HTN   Elevated BNP, 1110  Elevated troponin 16, repeat 37 with delta 21  ECHO 01/2021 normal EF 60%   Fluid overload? Hold fluids      Gastrointestinal   GI prophylaxis - 40 mg daily   Start TF + Bowel regimen     Renal   Renal function is stable   Monitor electrolytes, replete as needed      Infectious Disease   LLL pneumonia   Leukocytosis, 12.7  Afebrile   Rocephin 1g IV daily   Doxycycline 100 mg Q12H   Follow CRP, Sed rate and Procalcitonin     Hematology/Oncology   Hgb stable   Monitor H/H   Transfuse if Hgb <7     Hx of DVT/PE on chronic anticoagulation   Cont Coumadin  Switch to Lovenox for AC    Lovenox 90 mg SQ BID    Endocrine   Monitor BGs   Permissive hyperglycemia 140-180   Consider SSI/Lantus if BGs elevated    Social/Spiritual/DNR/Other   FULL CODE   Patient has been extubated since yesterday and remains hemodynamically stable for transfer to step down unit. The patient was seen and evaluated by myself and Dr. Opal Dawn DO PGY-1  10/28/2021  1:20 PM               I personally saw, examined and provided care for the patient. Radiographs, labs and medication list were reviewed by me independently. Review of Residents documentation was conducted and revisions were made as appropriate. I agree with the above documented exam, problem list and plan of care.         CCT excluding procedures >30 minutes    Kristina Hill DO

## 2021-10-28 NOTE — PROGRESS NOTES
Date: 10/27/2021    Time: 11:58 PM    Patient Placed On BIPAP/CPAP/ Non-Invasive Ventilation? Yes    If no must comment. Facial area red/color change? No           If YES are Blister/Lesion present? No   If yes must notify nursing staff  BIPAP/CPAP skin barrier?   Yes    Skin barrier type: mepilex       Comments:        Aleyda Lott RCP

## 2021-10-29 ENCOUNTER — APPOINTMENT (OUTPATIENT)
Dept: GENERAL RADIOLOGY | Age: 81
DRG: 871 | End: 2021-10-29
Payer: MEDICARE

## 2021-10-29 LAB
ALBUMIN SERPL-MCNC: 3.5 G/DL (ref 3.5–5.2)
ALP BLD-CCNC: 102 U/L (ref 35–104)
ALT SERPL-CCNC: 16 U/L (ref 0–32)
ANION GAP SERPL CALCULATED.3IONS-SCNC: 11 MMOL/L (ref 7–16)
AST SERPL-CCNC: 11 U/L (ref 0–31)
BASOPHILS ABSOLUTE: 0.03 E9/L (ref 0–0.2)
BASOPHILS RELATIVE PERCENT: 0.2 % (ref 0–2)
BILIRUB SERPL-MCNC: <0.2 MG/DL (ref 0–1.2)
BUN BLDV-MCNC: 25 MG/DL (ref 6–23)
CALCIUM SERPL-MCNC: 9 MG/DL (ref 8.6–10.2)
CHLORIDE BLD-SCNC: 105 MMOL/L (ref 98–107)
CO2: 27 MMOL/L (ref 22–29)
CREAT SERPL-MCNC: 0.8 MG/DL (ref 0.5–1)
EOSINOPHILS ABSOLUTE: 0 E9/L (ref 0.05–0.5)
EOSINOPHILS RELATIVE PERCENT: 0 % (ref 0–6)
GFR AFRICAN AMERICAN: >60
GFR NON-AFRICAN AMERICAN: >60 ML/MIN/1.73
GLUCOSE BLD-MCNC: 191 MG/DL (ref 74–99)
HCT VFR BLD CALC: 43.1 % (ref 34–48)
HEMOGLOBIN: 13.4 G/DL (ref 11.5–15.5)
IMMATURE GRANULOCYTES #: 0.25 E9/L
IMMATURE GRANULOCYTES %: 1.9 % (ref 0–5)
INR BLD: 1.6
LYMPHOCYTES ABSOLUTE: 0.85 E9/L (ref 1.5–4)
LYMPHOCYTES RELATIVE PERCENT: 6.4 % (ref 20–42)
MAGNESIUM: 1.8 MG/DL (ref 1.6–2.6)
MCH RBC QN AUTO: 29.5 PG (ref 26–35)
MCHC RBC AUTO-ENTMCNC: 31.1 % (ref 32–34.5)
MCV RBC AUTO: 94.7 FL (ref 80–99.9)
MONOCYTES ABSOLUTE: 0.6 E9/L (ref 0.1–0.95)
MONOCYTES RELATIVE PERCENT: 4.5 % (ref 2–12)
NEUTROPHILS ABSOLUTE: 11.61 E9/L (ref 1.8–7.3)
NEUTROPHILS RELATIVE PERCENT: 87 % (ref 43–80)
PDW BLD-RTO: 14.6 FL (ref 11.5–15)
PHOSPHORUS: 2.8 MG/DL (ref 2.5–4.5)
PLATELET # BLD: 417 E9/L (ref 130–450)
PMV BLD AUTO: 10.3 FL (ref 7–12)
POTASSIUM SERPL-SCNC: 4.1 MMOL/L (ref 3.5–5)
PROTHROMBIN TIME: 17.3 SEC (ref 9.3–12.4)
RBC # BLD: 4.55 E12/L (ref 3.5–5.5)
SODIUM BLD-SCNC: 143 MMOL/L (ref 132–146)
TOTAL PROTEIN: 5.9 G/DL (ref 6.4–8.3)
WBC # BLD: 13.3 E9/L (ref 4.5–11.5)

## 2021-10-29 PROCEDURE — 85025 COMPLETE CBC W/AUTO DIFF WBC: CPT

## 2021-10-29 PROCEDURE — 36415 COLL VENOUS BLD VENIPUNCTURE: CPT

## 2021-10-29 PROCEDURE — 6370000000 HC RX 637 (ALT 250 FOR IP): Performed by: INTERNAL MEDICINE

## 2021-10-29 PROCEDURE — 85610 PROTHROMBIN TIME: CPT

## 2021-10-29 PROCEDURE — 2700000000 HC OXYGEN THERAPY PER DAY

## 2021-10-29 PROCEDURE — 6360000002 HC RX W HCPCS: Performed by: STUDENT IN AN ORGANIZED HEALTH CARE EDUCATION/TRAINING PROGRAM

## 2021-10-29 PROCEDURE — 83735 ASSAY OF MAGNESIUM: CPT

## 2021-10-29 PROCEDURE — 6360000002 HC RX W HCPCS: Performed by: INTERNAL MEDICINE

## 2021-10-29 PROCEDURE — 6370000000 HC RX 637 (ALT 250 FOR IP): Performed by: PHYSICIAN ASSISTANT

## 2021-10-29 PROCEDURE — 2580000003 HC RX 258: Performed by: EMERGENCY MEDICINE

## 2021-10-29 PROCEDURE — 80053 COMPREHEN METABOLIC PANEL: CPT

## 2021-10-29 PROCEDURE — 6370000000 HC RX 637 (ALT 250 FOR IP): Performed by: STUDENT IN AN ORGANIZED HEALTH CARE EDUCATION/TRAINING PROGRAM

## 2021-10-29 PROCEDURE — 97530 THERAPEUTIC ACTIVITIES: CPT

## 2021-10-29 PROCEDURE — 1200000000 HC SEMI PRIVATE

## 2021-10-29 PROCEDURE — 6360000002 HC RX W HCPCS: Performed by: PHYSICIAN ASSISTANT

## 2021-10-29 PROCEDURE — 84100 ASSAY OF PHOSPHORUS: CPT

## 2021-10-29 PROCEDURE — 2580000003 HC RX 258: Performed by: INTERNAL MEDICINE

## 2021-10-29 PROCEDURE — 97165 OT EVAL LOW COMPLEX 30 MIN: CPT

## 2021-10-29 PROCEDURE — 2500000003 HC RX 250 WO HCPCS: Performed by: PHYSICIAN ASSISTANT

## 2021-10-29 PROCEDURE — 94660 CPAP INITIATION&MGMT: CPT

## 2021-10-29 PROCEDURE — C9113 INJ PANTOPRAZOLE SODIUM, VIA: HCPCS | Performed by: INTERNAL MEDICINE

## 2021-10-29 PROCEDURE — 71045 X-RAY EXAM CHEST 1 VIEW: CPT

## 2021-10-29 PROCEDURE — 97161 PT EVAL LOW COMPLEX 20 MIN: CPT

## 2021-10-29 PROCEDURE — 94640 AIRWAY INHALATION TREATMENT: CPT

## 2021-10-29 RX ADMIN — SODIUM CHLORIDE, PRESERVATIVE FREE 10 ML: 5 INJECTION INTRAVENOUS at 10:15

## 2021-10-29 RX ADMIN — HYDRALAZINE HYDROCHLORIDE 10 MG: 20 INJECTION INTRAMUSCULAR; INTRAVENOUS at 00:36

## 2021-10-29 RX ADMIN — PANTOPRAZOLE SODIUM 40 MG: 40 INJECTION, POWDER, FOR SOLUTION INTRAVENOUS at 10:08

## 2021-10-29 RX ADMIN — WARFARIN SODIUM 7.5 MG: 7.5 TABLET ORAL at 18:50

## 2021-10-29 RX ADMIN — ARFORMOTEROL TARTRATE 15 MCG: 15 SOLUTION RESPIRATORY (INHALATION) at 19:22

## 2021-10-29 RX ADMIN — IPRATROPIUM BROMIDE AND ALBUTEROL SULFATE 1 AMPULE: .5; 2.5 SOLUTION RESPIRATORY (INHALATION) at 08:14

## 2021-10-29 RX ADMIN — SODIUM CHLORIDE, PRESERVATIVE FREE 10 ML: 5 INJECTION INTRAVENOUS at 20:32

## 2021-10-29 RX ADMIN — SODIUM CHLORIDE, PRESERVATIVE FREE 10 ML: 5 INJECTION INTRAVENOUS at 10:08

## 2021-10-29 RX ADMIN — AMLODIPINE BESYLATE 2.5 MG: 2.5 TABLET ORAL at 10:11

## 2021-10-29 RX ADMIN — METHYLPREDNISOLONE SODIUM SUCCINATE 40 MG: 40 INJECTION, POWDER, LYOPHILIZED, FOR SOLUTION INTRAMUSCULAR; INTRAVENOUS at 10:08

## 2021-10-29 RX ADMIN — LISINOPRIL 20 MG: 20 TABLET ORAL at 18:50

## 2021-10-29 RX ADMIN — LISINOPRIL 20 MG: 20 TABLET ORAL at 10:10

## 2021-10-29 RX ADMIN — ENOXAPARIN SODIUM 90 MG: 100 INJECTION SUBCUTANEOUS at 20:32

## 2021-10-29 RX ADMIN — LABETALOL HYDROCHLORIDE 10 MG: 5 INJECTION INTRAVENOUS at 19:00

## 2021-10-29 RX ADMIN — IPRATROPIUM BROMIDE AND ALBUTEROL SULFATE 1 AMPULE: .5; 2.5 SOLUTION RESPIRATORY (INHALATION) at 15:32

## 2021-10-29 RX ADMIN — IPRATROPIUM BROMIDE AND ALBUTEROL SULFATE 1 AMPULE: .5; 2.5 SOLUTION RESPIRATORY (INHALATION) at 19:22

## 2021-10-29 RX ADMIN — METHYLPREDNISOLONE SODIUM SUCCINATE 40 MG: 40 INJECTION, POWDER, LYOPHILIZED, FOR SOLUTION INTRAMUSCULAR; INTRAVENOUS at 20:32

## 2021-10-29 RX ADMIN — ENOXAPARIN SODIUM 90 MG: 100 INJECTION SUBCUTANEOUS at 10:14

## 2021-10-29 RX ADMIN — ARFORMOTEROL TARTRATE 15 MCG: 15 SOLUTION RESPIRATORY (INHALATION) at 08:15

## 2021-10-29 RX ADMIN — IPRATROPIUM BROMIDE AND ALBUTEROL SULFATE 1 AMPULE: .5; 2.5 SOLUTION RESPIRATORY (INHALATION) at 12:16

## 2021-10-29 ASSESSMENT — PAIN SCALES - GENERAL
PAINLEVEL_OUTOF10: 0
PAINLEVEL_OUTOF10: 0

## 2021-10-29 NOTE — CARE COORDINATION
Care coordination: CM placed call to daughter Cristal Memory re: SNF choices. Per Sueanne Memory she was unable to review list and still needs to speak with family regarding options. CM/WILLA to follow up.     Bruce Fang, RN  Case Management

## 2021-10-29 NOTE — PROGRESS NOTES
Subjective: The patient is awake and alert. Lying in bed without complaints  No acute events overnight. Denies chest pain, angina, SOB       Objective:    BP (!) 149/68   Pulse 86   Temp 97.8 °F (36.6 °C) (Oral)   Resp 20   Ht 5' 5\" (1.651 m)   Wt 224 lb (101.6 kg)   SpO2 96%   BMI 37.28 kg/m²     No intake/output data recorded. No intake/output data recorded. General appearance: NAD, conversant  HEENT: AT/NC, MMM  Neck: FROM, supple  Lungs: Diminshed   2 liters O2  CV: RRR, no MRGs  Vasc: Radial pulses 2+  Abdomen: Soft, non-tender; no masses or HSM  Extremities: No peripheral edema or digital cyanosis  Skin: no rash, lesions or ulcers  Psych: Alert and oriented to person, place and time  Neuro: Alert and interactive     Recent Labs     10/27/21  0435 10/28/21  0610 10/29/21  1242   WBC 11.1 11.1 13.3*   HGB 11.9 12.2 13.4   HCT 37.9 39.1 43.1    368 417       Recent Labs     10/27/21  0435 10/28/21  0610 10/29/21  1242    143 143   K 4.0 4.2 4.1    106 105   CO2 27 26 27   BUN 28* 28* 25*   CREATININE 0.8 0.7 0.8   CALCIUM 9.0 9.0 9.0       Assessment:    Principal Problem:    Pneumonia  Active Problems:    HTN (hypertension)    Hypokalemia    Acute respiratory failure with hypercapnia (HCC)  Resolved Problems:    * No resolved hospital problems. *      Plan:    Admitted for acute on chronic hypoxemic respiratory failure from pna   intubation on admission   Extubated 10/27 -   continue abx therapy   pulm following -   iss for dm   Continue coumadin goal inr 2-3   Resume home meds   Weaning steroids    DVT Prophylaxis   PT/OT  Discharge [address], APRN - CNP  3:05 PM  10/29/2021     Above note edited to reflect my thoughts   Pulmonology following, will discuss  Likely ready for discharge next 24 to 48 hours  Resume Coumadin for goal INR 2-3-home doses noted, will continue-monitor INR    I personally saw, examined and provided care for the patient.

## 2021-10-29 NOTE — PROGRESS NOTES
[facility] Quality Flow/Interdisciplinary Rounds Progress Note        Quality Flow Rounds held on October 29, 2021    Disciplines Attending:  Bedside Nurse, ,  and Nursing Unit Leadership    Susan Walker was admitted on 10/25/2021 11:18 AM    Anticipated Discharge Date:  Expected Discharge Date: 11/06/21    Disposition:    Juan R Score:  Juan R Scale Score: 16    Readmission Risk              Risk of Unplanned Readmission:  27           Discussed patient goal for the day, patient clinical progression, and barriers to discharge.   The following Goal(s) of the Day/Commitment(s) have been identified:  Medications - Obtain Order to Convert IV to [address], RN  October 29, 2021

## 2021-10-29 NOTE — PROGRESS NOTES
Date: 10/29/2021    Time: 2:52 AM    Patient Placed On BIPAP/CPAP/ Non-Invasive Ventilation? Patient continues on bipap    If no must comment. Facial area red/color change? No           If YES are Blister/Lesion present? No   If yes must notify nursing staff  BIPAP/CPAP skin barrier?   Yes    Skin barrier type:mepilexlite       Comments:        Ion Ortiz RCP

## 2021-10-29 NOTE — PROGRESS NOTES
Occupational Therapy  OCCUPATIONAL THERAPY INITIAL EVALUATION    [address] [facility]      Date:10/29/2021                                                Patient Name: Abbie Aguilar  MRN: [id number]  : 1940  Room: [address] [place] [address] [id number]    Referring Provider: Stacia Ochoa DO  Specific Provider Orders/Date: OT eval and treat 10/28/21    Diagnosis: Pneumonia [J18.9]   Pt admitted to hospital on 10/25/21 for SOB, cough    RRT on 10/25 for respiratory distress  Extubated on 10/27 to NC     Pertinent Medical History:  has a past medical history of COPD (chronic obstructive pulmonary disease) ([facility] [address] DVT (deep venous thrombosis) ([facility] [address] Hypertension, and Pulmonary embolism ([facility] [address]        Precautions:  Fall Risk, Purewick, O2 (2L)    Assessment of current deficits    [x] Functional mobility  [x]ADLs  [x] Strength               []Cognition    [x] Functional transfers   [x] IADLs         [x] Safety Awareness   [x]Endurance    [] Fine Coordination              [x] Balance      [] Vision/perception   []Sensation     []Gross Motor Coordination  [] ROM  [] Delirium                   [] Motor Control     OT PLAN OF CARE   OT POC based on physician orders, patient diagnosis and results of clinical assessment    Frequency/Duration 1-3 days/wk for 2 weeks PRN   Specific OT Treatment Interventions to include:   * Instruction/training on adapted ADL techniques and AE recommendations to increase functional independence within precautions       * Training on energy conservation strategies, correct breathing pattern and techniques to improve independence/tolerance for self-care routine  * Functional transfer/mobility training/DME recommendations for increased independence, safety, and fall prevention  * Patient/Family education to increase follow through with safety techniques and functional independence  * Recommendation of environmental modifications for increased safety with functional transfers/mobility and ADLs  * Therapeutic exercise to improve motor endurance, ROM, and functional strength for ADLs/functional transfers  * Therapeutic activities to facilitate/challenge dynamic balance, stand tolerance for increased safety and independence with ADLs  * Therapeutic activities to facilitate gross/fine motor skills for increased independence with ADLs      Recommended Adaptive Equipment: TBD     Home Living: Pt lives with daughter (who works) in 2 floor home. 2 RAJIV, 1 handrail   1st floor setup   Bathroom setup: tub/shower with grab bars and shower chair    Equipment owned: w/w, home O2    Prior Level of Function: assist PRN with ADLs , assistance with IADLs; ambulated w/ w/w   Driving: no - daughter assists    Pain Level: Pt c/o no pain this session     Cognition: A&O: 3/4 (not to year - cues required to recall);  Follows 1 step directions   Memory:  fair    Sequencing:  fair    Problem solving:  fair    Judgement/safety:  fair      Functional Assessment:  AM-PAC Daily Activity Raw Score: 15/24   Initial Eval Status  Date: 10/29/21 Treatment Status  Date: STGs = LTGs  Time frame: 10-14 days   Feeding Supervision   Modified Decker   Grooming Minimal Assist   Standing at sink  Modified Decker    UB Dressing Stand by Assist   Modified Decker    LB Dressing Maximal Assist   B socks  Stand by Assist    Bathing Moderate Assist  Simulated  Stand by Assist    Toileting Moderate Assist   Stand by Assist    Bed Mobility  Supine to sit: Minimal Assist   Sit to supine: Minimal Assist   Supine to sit: Modified Decker   Sit to supine: Modified Decker    Functional Transfers Minimal Assist   Supervision    Functional Mobility Minimal Assist w/ w/w  EOB><bathroom  Supervision    Balance Sitting:     Static:  Supervision    Dynamic:SBA  Standing: Min A w/ w/w     Activity Tolerance Fair-  Noted fatigue and SOB w/ moderate activity  Fair+ Visual/  Perceptual Glasses: no                  Hand Dominance R   AROM (PROM) Strength Additional Info:    RUE  WFL 4-/5 good  and wfl FMC/dexterity noted during ADL tasks       LUE WFL 4-/5 good  and wfl FMC/dexterity noted during ADL tasks       Hearing: WFL  Sensation:  No c/o numbness or tingling   Tone: WFL   Edema: B LE's    Comments: Upon arrival patient lying in bed. Pt agreeable to OT session this date. Therapist educated pt on role of OT. At end of session, patient lying in bed (per pt request, bed alarm on) with call light and phone within reach, all lines and tubes intact. Overall patient demonstrated decreased independence and safety during completion of ADL/functional transfer/mobility tasks. Pt would benefit from continued skilled OT to increase safety and independence with completion of ADL/IADL tasks for functional independence and quality of life. Treatment: OT treatment provided this date includes: Facilitation of bed mobility (education/cues for body mechanics), unsupported sitting balance (addressing posture, weight shifting, dynamic reaching to prep for ADL's), functional transfers (various surfaces w/ education/cues for safety/hand placement), standing tolerance tasks (addressing posture, balance and activity tolerance while incorporating light functional reaching impacting ADLs and functional activity) and functional ambulation tasks with w/w (including to/ from bathroomand in preparation for item retrieval tasks w/ education/cuing on posture, w/w management, energy conservation techniques and safety). Therapist facilitated self-care retraining: LB self-care tasks (socks) and standing grooming tasks while educating/cuing pt on modified techniques, posture, safety and energy conservation techniques. Skilled monitoring of HR, O2 sats and pts response to treatment. Pt on 2L O2 via nasal cannula. O2 sat=^93% at rest and w/ activity.  Pt c/o SOB w/ moderate activity - reinforced

## 2021-10-29 NOTE — PROGRESS NOTES
Pulmonary Progress Note    Admit Date: 10/25/2021  Hospital day                               PCP: Eugene Queen MD    Chief Complaint (s):  Patient Active Problem List   Diagnosis    Pneumonia due to organism    Chronic respiratory failure with hypoxia (HCC)    COPD exacerbation ([address]    Supratherapeutic INR    FAISAL (obstructive sleep apnea)    Leukocytosis    Dyspnea and respiratory abnormalities    HTN (hypertension)    Tobacco abuse    History of pulmonary embolism    History of DVT (deep vein thrombosis)    Subtherapeutic international normalized ratio (INR)    Chronic anemia    Iron deficiency anemia    Thrombocytosis    Pneumonia    Hypokalemia    Acute respiratory failure with hypercapnia (HCC)       Subjective:  · Comfortably, arouses easily. Events in the ICU are reviewed.       Vitals:  VITALS:  BP (!) 149/68   Pulse 86   Temp 97.8 °F (36.6 °C) (Oral)   Resp 20   Ht 5' 5\" (1.651 m)   Wt 224 lb (101.6 kg)   SpO2 96%   BMI 37.28 kg/m²     24HR INTAKE/OUTPUT:      Intake/Output Summary (Last 24 hours) at 10/29/2021 1450  Last data filed at 10/28/2021 1800  Gross per 24 hour   Intake 120 ml   Output 265 ml   Net -145 ml       24HR PULSE OXIMETRY RANGE:    SpO2  Av %  Min: 96 %  Max: 100 %    Medications:  IV:   sodium chloride         Scheduled Meds:   warfarin  10 mg Oral Once per day on u    warfarin  7.5 mg Oral Once per day on Sun Tue Wed Fri Sat    methylPREDNISolone  40 mg IntraVENous Q12H    enoxaparin  1 mg/kg SubCUTAneous BID    pantoprazole  40 mg IntraVENous Daily    And    sodium chloride (PF)  10 mL IntraVENous Daily    senna  5 mL Oral Nightly    docusate  100 mg Oral Daily    sodium chloride flush  5-40 mL IntraVENous 2 times per day    amLODIPine  2.5 mg Oral Daily    lisinopril  20 mg Oral BID    Arformoterol Tartrate  15 mcg Nebulization BID    influenza virus vaccine  0.5 mL IntraMUSCular Prior to discharge    ipratropium-albuterol  1 ampule Inhalation Q4H WA       Diet:   ADULT DIET; Regular; No Added Salt (3-4 gm)     EXAM:  General: No distress. Alert. Lying flat  Eyes: PERRL. No sclera icterus. No conjunctival injection. ENT: No discharge. Pharynx clear. Neck: Trachea midline. Normal thyroid. Resp: No accessory muscle use. No rales. No wheezing. No rhonchi. CV: Regular rate. Regular rhythm. No murmur or rub. Abd: Non-tender. Non-distended. No masses. No organomegaly. Normal bowel sounds. Skin: Warm and dry. No nodule on exposed extremities. No rash on exposed extremities. Ext: No cyanosis, clubbing, edema  Lymph: No cervical LAD. No supraclavicular LAD. M/S: No cyanosis. No joint deformity. No clubbing. Neuro: Awake. Follows commands. Positive pupils/gag/corneals. Normal pain response. Results:  CBC:   Recent Labs     10/27/21  0435 10/28/21  0610 10/29/21  1242   WBC 11.1 11.1 13.3*   HGB 11.9 12.2 13.4   HCT 37.9 39.1 43.1   MCV 95.0 94.9 94.7    368 417     BMP:   Recent Labs     10/27/21  0435 10/28/21  0610 10/29/21  1242    143 143   K 4.0 4.2 4.1    106 105   CO2 27 26 27   PHOS 4.6* 3.8 2.8   BUN 28* 28* 25*   CREATININE 0.8 0.7 0.8     LIVER PROFILE:   Recent Labs     10/27/21  0435 10/28/21  0610 10/29/21  1242   AST 12 20 11   ALT 13 19 16   BILITOT <0.2 <0.2 <0.2   ALKPHOS 96 98 102     PT/INR:   Recent Labs     10/29/21  1242   PROTIME 17.3*   INR 1.6     APTT: No results for input(s): APTT in the last 72 hours. Pathology:  1. N/A      Microbiology:  1. None    Recent ABG:   Recent Labs     10/27/21  0534   PH 7.385   PO2 96.9   PCO2 41.1   HCO3 24.0   BE -0.9   O2SAT 97.4   METHB 0.0   O2HB 96.8   COHB 0.6   O2CON 18.6   HHB 2.6   THB 13.6     FiO2 : 40 %  I:E Ratio: 1:2.70    Recent Films:  XR CHEST PORTABLE   Final Result   No acute process.          XR CHEST PORTABLE   Final Result   Small left-sided pleural effusion with adjacent left basilar airspace disease, similar in appearance. Mild asymmetric right-sided airspace disease. Findings may be related to   asymmetric edema or pneumonia. Aeration in the right base is improved   compared to prior. XR CHEST PORTABLE   Final Result   Suboptimal inspiration and suspected atelectasis at the left base without   significant interval change. XR CHEST PORTABLE   Final Result   Suboptimal inspiration with suspected developing atelectasis on the left. XR CHEST PORTABLE   Final Result   1. Endotracheal tube terminates in the distal thoracic trachea. Atherosclerotic disease. Slight interval increase in interstitial markings. Possibly reflects underlying edema or potentially infection. Please   correlate with clinical presentation. 2.  Satisfactory position of the enteric tube. XR ABDOMEN FOR NG/OG/NE TUBE PLACEMENT   Final Result   1. Endotracheal tube terminates in the distal thoracic trachea. Atherosclerotic disease. Slight interval increase in interstitial markings. Possibly reflects underlying edema or potentially infection. Please   correlate with clinical presentation. 2.  Satisfactory position of the enteric tube. XR CHEST (2 VW)   Final Result   1. Left lung base infiltrate         XR CHEST PORTABLE    (Results Pending)       Assessment:  1. Acute hypercapnic respiratory failure: Transferred from the ICU post extubation on 10/27. Using nocturnal ventilation and as needed ventilation. Lungs today are clear  2. History of DVT/PE: On Lovenox transitioning to Coumadin. 3. Pneumonia: Improved. 4. Possible superimposed CHF    Plan:  1. Continue nocturnal ventilation  2. Continue anticoagulation  3. Wean steroids as tolerated    Time at the bedside, reviewing labs and radiographs, reviewing updated notes and consultations, discussing with staff and family was more than 35 minutes.     Please note that voice recognition technology was used in the preparation of this note and make therefore it may contain inadvertent transcription errors. If the patient is a COVID 19 isolation patient, the above physical exam reflects that of the examining physician for the day. Aide Gann MD,  M.D., F.C.C.P.     [facility], [address] [place] [address], [address] [place] [address]

## 2021-10-29 NOTE — PROGRESS NOTES
Date: 10/28/2021    Time: 10:22 PM    Patient Placed On BIPAP/CPAP/ Non-Invasive Ventilation? Yes    If no must comment. Facial area red/color change? No           If YES are Blister/Lesion present? No   If yes must notify nursing staff  BIPAP/CPAP skin barrier?   Yes    Skin barrier type:mepilexlite       Comments:        Virgil Carpenter RCP

## 2021-10-29 NOTE — PROGRESS NOTES
awareness   []Increased pain     Comments:  Pt reported need to use BR and was agreeable to walking into BR. She walked with slow gait speed with ww and demonstrated a mild unsteady gait. She transferred on/off commode with grab bar and MIN A. She performed self hygiene care and then stood at sink with CGA/MIN A while she washed her hands. She had one episode of a slight knee buckle, but with MIN A was able to right herself. Pt then walked back to chair next to her bed with MIN A and fatigue limited amb distance. Pt has decreased strength and endurance limiting functional Chase and amb distance at this time. Treatment:  Patient practiced and was instructed in the following treatment:     Bed mobility, transfers, ADLs, and gait with ww to improve functional strength, balance, and endurance. Pt was left sitting up in chair with call light left by patient. Pt's/ family goals   1. To go home. Patient and or family understand(s) diagnosis, prognosis, and plan of care. PHYSICAL THERAPY PLAN OF CARE:    PT POC is established based on physician order and patient diagnosis     Referring provider/PT Order:  PT eval and treat  Diagnosis:  Pneumonia [J18.9]  Specific instructions for next treatment:  Increase amb as pt is able.     Current Treatment Recommendations:     [x] Strengthening to improve independence with functional mobility   [] ROM to improve ROM and decrease spasm and pain which will help promote independence with functional mobility   [x] Balance Training to improve static/dynamic balance and to reduce fall risk  [x] Endurance Training to improve activity tolerance during functional mobility   [x] Transfer Training to improve safety and independence with all functional transfers   [x] Gait Training to improve gait mechanics, endurance and assess need for appropriate assistive device  [x] Stair Training in preparation for safe discharge home and/or into the community   [] Positioning to prevent skin breakdown and contractures  [] Safety and Education Training   [x] Patient/Caregiver Education   [] HEP  [] Other     PT long term treatment goals are located in above grid    Frequency of treatments: 2-5x/week x 1-2 weeks. Time in  07:00  Time out  07:30    Total Treatment Time 15 minutes     Evaluation Time includes thorough review of current medical information, gathering information on past medical history/social history and prior level of function, completion of standardized testing/informal observation of tasks, assessment of data and education on plan of care and goals. CPT codes:  [x] Low Complexity PT evaluation 55895  [] Moderate Complexity PT evaluation 28721  [] High Complexity PT evaluation 78643  [] PT Re-evaluation 49094  [] Gait training 38088 ** minutes  [] Manual therapy 89959 ** minutes  [x] Therapeutic activities 89177 15 minutes  [] Therapeutic exercises 66937 ** minutes  [] Neuromuscular reeducation 48102 ** minutes     James Cormier Bound., P.T.   License Number: [id number]

## 2021-10-29 NOTE — PLAN OF CARE
Problem: Falls - Risk of:  Goal: Will remain free from falls  Description: Will remain free from falls  10/29/2021 0243 by Conchita Wills RN  Outcome: Met This Shift     Problem: Falls - Risk of:  Goal: Absence of physical injury  Description: Absence of physical injury  Outcome: Met This Shift     Problem: Skin Integrity:  Goal: Will show no infection signs and symptoms  Description: Will show no infection signs and symptoms  10/29/2021 0243 by Conchita Wills RN  Outcome: Met This Shift     Problem: Skin Integrity:  Goal: Absence of new skin breakdown  Description: Absence of new skin breakdown  Outcome: Met This Shift

## 2021-10-30 ENCOUNTER — APPOINTMENT (OUTPATIENT)
Dept: GENERAL RADIOLOGY | Age: 81
DRG: 871 | End: 2021-10-30
Payer: MEDICARE

## 2021-10-30 LAB
ALBUMIN SERPL-MCNC: 3.7 G/DL (ref 3.5–5.2)
ALP BLD-CCNC: 105 U/L (ref 35–104)
ALT SERPL-CCNC: 15 U/L (ref 0–32)
ANION GAP SERPL CALCULATED.3IONS-SCNC: 12 MMOL/L (ref 7–16)
AST SERPL-CCNC: 12 U/L (ref 0–31)
BASOPHILS ABSOLUTE: 0.03 E9/L (ref 0–0.2)
BASOPHILS RELATIVE PERCENT: 0.2 % (ref 0–2)
BILIRUB SERPL-MCNC: 0.2 MG/DL (ref 0–1.2)
BLOOD CULTURE, ROUTINE: NORMAL
BUN BLDV-MCNC: 22 MG/DL (ref 6–23)
CALCIUM SERPL-MCNC: 8.9 MG/DL (ref 8.6–10.2)
CHLORIDE BLD-SCNC: 103 MMOL/L (ref 98–107)
CO2: 29 MMOL/L (ref 22–29)
CREAT SERPL-MCNC: 0.7 MG/DL (ref 0.5–1)
CULTURE, BLOOD 2: NORMAL
EOSINOPHILS ABSOLUTE: 0 E9/L (ref 0.05–0.5)
EOSINOPHILS RELATIVE PERCENT: 0 % (ref 0–6)
GFR AFRICAN AMERICAN: >60
GFR NON-AFRICAN AMERICAN: >60 ML/MIN/1.73
GLUCOSE BLD-MCNC: 133 MG/DL (ref 74–99)
HCT VFR BLD CALC: 44.8 % (ref 34–48)
HEMOGLOBIN: 13.7 G/DL (ref 11.5–15.5)
IMMATURE GRANULOCYTES #: 0.25 E9/L
IMMATURE GRANULOCYTES %: 1.8 % (ref 0–5)
INR BLD: 1.7
LYMPHOCYTES ABSOLUTE: 1.45 E9/L (ref 1.5–4)
LYMPHOCYTES RELATIVE PERCENT: 10.5 % (ref 20–42)
MAGNESIUM: 1.9 MG/DL (ref 1.6–2.6)
MCH RBC QN AUTO: 29 PG (ref 26–35)
MCHC RBC AUTO-ENTMCNC: 30.6 % (ref 32–34.5)
MCV RBC AUTO: 94.7 FL (ref 80–99.9)
MONOCYTES ABSOLUTE: 0.65 E9/L (ref 0.1–0.95)
MONOCYTES RELATIVE PERCENT: 4.7 % (ref 2–12)
NEUTROPHILS ABSOLUTE: 11.47 E9/L (ref 1.8–7.3)
NEUTROPHILS RELATIVE PERCENT: 82.8 % (ref 43–80)
PDW BLD-RTO: 14.2 FL (ref 11.5–15)
PHOSPHORUS: 3.1 MG/DL (ref 2.5–4.5)
PLATELET # BLD: 422 E9/L (ref 130–450)
PMV BLD AUTO: 10.2 FL (ref 7–12)
POTASSIUM SERPL-SCNC: 3.8 MMOL/L (ref 3.5–5)
PROTHROMBIN TIME: 18.3 SEC (ref 9.3–12.4)
RBC # BLD: 4.73 E12/L (ref 3.5–5.5)
SODIUM BLD-SCNC: 144 MMOL/L (ref 132–146)
TOTAL PROTEIN: 6.5 G/DL (ref 6.4–8.3)
WBC # BLD: 13.9 E9/L (ref 4.5–11.5)

## 2021-10-30 PROCEDURE — 80053 COMPREHEN METABOLIC PANEL: CPT

## 2021-10-30 PROCEDURE — 6370000000 HC RX 637 (ALT 250 FOR IP): Performed by: INTERNAL MEDICINE

## 2021-10-30 PROCEDURE — 83735 ASSAY OF MAGNESIUM: CPT

## 2021-10-30 PROCEDURE — 85610 PROTHROMBIN TIME: CPT

## 2021-10-30 PROCEDURE — 6360000002 HC RX W HCPCS: Performed by: INTERNAL MEDICINE

## 2021-10-30 PROCEDURE — 6370000000 HC RX 637 (ALT 250 FOR IP): Performed by: PHYSICIAN ASSISTANT

## 2021-10-30 PROCEDURE — 6360000002 HC RX W HCPCS: Performed by: PHYSICIAN ASSISTANT

## 2021-10-30 PROCEDURE — 85025 COMPLETE CBC W/AUTO DIFF WBC: CPT

## 2021-10-30 PROCEDURE — C9113 INJ PANTOPRAZOLE SODIUM, VIA: HCPCS | Performed by: INTERNAL MEDICINE

## 2021-10-30 PROCEDURE — 94660 CPAP INITIATION&MGMT: CPT

## 2021-10-30 PROCEDURE — 84100 ASSAY OF PHOSPHORUS: CPT

## 2021-10-30 PROCEDURE — 2700000000 HC OXYGEN THERAPY PER DAY

## 2021-10-30 PROCEDURE — 36415 COLL VENOUS BLD VENIPUNCTURE: CPT

## 2021-10-30 PROCEDURE — 2580000003 HC RX 258: Performed by: EMERGENCY MEDICINE

## 2021-10-30 PROCEDURE — 6360000002 HC RX W HCPCS: Performed by: STUDENT IN AN ORGANIZED HEALTH CARE EDUCATION/TRAINING PROGRAM

## 2021-10-30 PROCEDURE — 1200000000 HC SEMI PRIVATE

## 2021-10-30 PROCEDURE — 2580000003 HC RX 258: Performed by: INTERNAL MEDICINE

## 2021-10-30 PROCEDURE — 2580000003 HC RX 258

## 2021-10-30 PROCEDURE — 71045 X-RAY EXAM CHEST 1 VIEW: CPT

## 2021-10-30 PROCEDURE — 94640 AIRWAY INHALATION TREATMENT: CPT

## 2021-10-30 RX ADMIN — HYDRALAZINE HYDROCHLORIDE 10 MG: 20 INJECTION INTRAMUSCULAR; INTRAVENOUS at 05:17

## 2021-10-30 RX ADMIN — METHYLPREDNISOLONE SODIUM SUCCINATE 40 MG: 40 INJECTION, POWDER, LYOPHILIZED, FOR SOLUTION INTRAMUSCULAR; INTRAVENOUS at 21:32

## 2021-10-30 RX ADMIN — IPRATROPIUM BROMIDE AND ALBUTEROL SULFATE 1 AMPULE: .5; 2.5 SOLUTION RESPIRATORY (INHALATION) at 11:46

## 2021-10-30 RX ADMIN — IPRATROPIUM BROMIDE AND ALBUTEROL SULFATE 1 AMPULE: .5; 2.5 SOLUTION RESPIRATORY (INHALATION) at 20:38

## 2021-10-30 RX ADMIN — ARFORMOTEROL TARTRATE 15 MCG: 15 SOLUTION RESPIRATORY (INHALATION) at 20:40

## 2021-10-30 RX ADMIN — SODIUM CHLORIDE, PRESERVATIVE FREE 10 ML: 5 INJECTION INTRAVENOUS at 21:32

## 2021-10-30 RX ADMIN — SODIUM CHLORIDE, PRESERVATIVE FREE 10 ML: 5 INJECTION INTRAVENOUS at 09:02

## 2021-10-30 RX ADMIN — SODIUM CHLORIDE, PRESERVATIVE FREE 10 ML: 5 INJECTION INTRAVENOUS at 09:01

## 2021-10-30 RX ADMIN — WATER 10 ML: 1 INJECTION INTRAMUSCULAR; INTRAVENOUS; SUBCUTANEOUS at 21:32

## 2021-10-30 RX ADMIN — ENOXAPARIN SODIUM 90 MG: 100 INJECTION SUBCUTANEOUS at 21:31

## 2021-10-30 RX ADMIN — LISINOPRIL 20 MG: 20 TABLET ORAL at 09:01

## 2021-10-30 RX ADMIN — PANTOPRAZOLE SODIUM 40 MG: 40 INJECTION, POWDER, FOR SOLUTION INTRAVENOUS at 09:01

## 2021-10-30 RX ADMIN — AMLODIPINE BESYLATE 2.5 MG: 2.5 TABLET ORAL at 09:01

## 2021-10-30 RX ADMIN — ARFORMOTEROL TARTRATE 15 MCG: 15 SOLUTION RESPIRATORY (INHALATION) at 08:12

## 2021-10-30 RX ADMIN — ENOXAPARIN SODIUM 90 MG: 100 INJECTION SUBCUTANEOUS at 09:01

## 2021-10-30 RX ADMIN — WARFARIN SODIUM 7.5 MG: 7.5 TABLET ORAL at 18:45

## 2021-10-30 RX ADMIN — LISINOPRIL 20 MG: 20 TABLET ORAL at 18:45

## 2021-10-30 RX ADMIN — METHYLPREDNISOLONE SODIUM SUCCINATE 40 MG: 40 INJECTION, POWDER, LYOPHILIZED, FOR SOLUTION INTRAMUSCULAR; INTRAVENOUS at 09:01

## 2021-10-30 RX ADMIN — IPRATROPIUM BROMIDE AND ALBUTEROL SULFATE 1 AMPULE: .5; 2.5 SOLUTION RESPIRATORY (INHALATION) at 08:12

## 2021-10-30 RX ADMIN — IPRATROPIUM BROMIDE AND ALBUTEROL SULFATE 1 AMPULE: .5; 2.5 SOLUTION RESPIRATORY (INHALATION) at 17:04

## 2021-10-30 ASSESSMENT — PAIN SCALES - GENERAL
PAINLEVEL_OUTOF10: 0

## 2021-10-30 NOTE — PROGRESS NOTES
Pulmonary Progress Note    Admit Date: 10/25/2021  Hospital day                               PCP: Lane Maldonado MD    Chief Complaint (s):  Patient Active Problem List   Diagnosis    Pneumonia due to organism    Chronic respiratory failure with hypoxia (HCC)    COPD exacerbation ([address]    Supratherapeutic INR    FAISAL (obstructive sleep apnea)    Leukocytosis    Dyspnea and respiratory abnormalities    HTN (hypertension)    Tobacco abuse    History of pulmonary embolism    History of DVT (deep vein thrombosis)    Subtherapeutic international normalized ratio (INR)    Chronic anemia    Iron deficiency anemia    Thrombocytosis    Pneumonia    Hypokalemia    Acute respiratory failure with hypercapnia (HCC)       Subjective:  · Still resting comfortably unarousable however when asleep, the patient really should be on noninvasive ventilation.       Vitals:  VITALS:  BP (!) 143/72   Pulse 96   Temp 97.7 °F (36.5 °C) (Oral)   Resp 20   Ht 5' 5\" (1.651 m)   Wt 224 lb (101.6 kg)   SpO2 94%   BMI 37.28 kg/m²     24HR INTAKE/OUTPUT:      Intake/Output Summary (Last 24 hours) at 10/30/2021 1605  Last data filed at 10/30/2021 0526  Gross per 24 hour   Intake --   Output 600 ml   Net -600 ml       24HR PULSE OXIMETRY RANGE:    SpO2  Av %  Min: 94 %  Max: 98 %    Medications:  IV:   sodium chloride         Scheduled Meds:   warfarin  10 mg Oral Once per day on u    warfarin  7.5 mg Oral Once per day on Sun Tue Wed Fri Sat    methylPREDNISolone  40 mg IntraVENous Q12H    enoxaparin  1 mg/kg SubCUTAneous BID    pantoprazole  40 mg IntraVENous Daily    And    sodium chloride (PF)  10 mL IntraVENous Daily    senna  5 mL Oral Nightly    docusate sodium  100 mg Oral Daily    sodium chloride flush  5-40 mL IntraVENous 2 times per day    amLODIPine  2.5 mg Oral Daily    lisinopril  20 mg Oral BID    Arformoterol Tartrate  15 mcg Nebulization BID    influenza virus vaccine 0.5 mL IntraMUSCular Prior to discharge    ipratropium-albuterol  1 ampule Inhalation Q4H WA       Diet:   ADULT DIET; Regular; No Added Salt (3-4 gm)     EXAM:  General: No distress. Alert. Lying flat  Eyes: PERRL. No sclera icterus. No conjunctival injection. ENT: No discharge. Pharynx clear. Neck: Trachea midline. Normal thyroid. Resp: No accessory muscle use. No rales. No wheezing. No rhonchi. CV: Regular rate. Regular rhythm. No murmur or rub. Abd: Non-tender. Non-distended. No masses. No organomegaly. Normal bowel sounds. Skin: Warm and dry. No nodule on exposed extremities. No rash on exposed extremities. Ext: No cyanosis, clubbing, edema  Lymph: No cervical LAD. No supraclavicular LAD. M/S: No cyanosis. No joint deformity. No clubbing. Neuro: Awake. Follows commands. Positive pupils/gag/corneals. Normal pain response. Results:  CBC:   Recent Labs     10/28/21  0610 10/29/21  1242 10/30/21  0752   WBC 11.1 13.3* 13.9*   HGB 12.2 13.4 13.7   HCT 39.1 43.1 44.8   MCV 94.9 94.7 94.7    417 422     BMP:   Recent Labs     10/28/21  0610 10/29/21  1242 10/30/21  0752    143 144   K 4.2 4.1 3.8    105 103   CO2 26 27 29   PHOS 3.8 2.8 3.1   BUN 28* 25* 22   CREATININE 0.7 0.8 0.7     LIVER PROFILE:   Recent Labs     10/28/21  0610 10/29/21  1242 10/30/21  0752   AST 20 11 12   ALT 19 16 15   BILITOT <0.2 <0.2 0.2   ALKPHOS 98 102 105*     PT/INR:   Recent Labs     10/29/21  1242 10/30/21  0752   PROTIME 17.3* 18.3*   INR 1.6 1.7     APTT: No results for input(s): APTT in the last 72 hours. Pathology:  1. N/A      Microbiology:  1. None    Recent ABG:   No results for input(s): PH, PO2, PCO2, HCO3, BE, O2SAT, METHB, O2HB, COHB, O2CON, HHB, THB in the last 72 hours. FiO2 : 40 %  I:E Ratio: 1:2.70    Recent Films:  XR CHEST PORTABLE   Final Result   No acute process. XR CHEST PORTABLE   Final Result   No acute process.          XR CHEST PORTABLE   Final Result Small left-sided pleural effusion with adjacent left basilar airspace   disease, similar in appearance. Mild asymmetric right-sided airspace disease. Findings may be related to   asymmetric edema or pneumonia. Aeration in the right base is improved   compared to prior. XR CHEST PORTABLE   Final Result   Suboptimal inspiration and suspected atelectasis at the left base without   significant interval change. XR CHEST PORTABLE   Final Result   Suboptimal inspiration with suspected developing atelectasis on the left. XR CHEST PORTABLE   Final Result   1. Endotracheal tube terminates in the distal thoracic trachea. Atherosclerotic disease. Slight interval increase in interstitial markings. Possibly reflects underlying edema or potentially infection. Please   correlate with clinical presentation. 2.  Satisfactory position of the enteric tube. XR ABDOMEN FOR NG/OG/NE TUBE PLACEMENT   Final Result   1. Endotracheal tube terminates in the distal thoracic trachea. Atherosclerotic disease. Slight interval increase in interstitial markings. Possibly reflects underlying edema or potentially infection. Please   correlate with clinical presentation. 2.  Satisfactory position of the enteric tube. XR CHEST (2 VW)   Final Result   1. Left lung base infiltrate         XR CHEST PORTABLE    (Results Pending)       Assessment:  1. Acute hypercapnic respiratory failure: Transferred from the ICU post extubation on 10/27. Using nocturnal ventilation and as needed ventilation. Lungs today are clear  2. History of DVT/PE: On Lovenox transitioning to Coumadin. 3. Pneumonia: Improved. 4. Possible superimposed CHF    Plan:  1. Continue nocturnal ventilation, this needs to be used during the day as well with any naps. 2. Continue anticoagulation  3.  Wean steroids as tolerated    Time at the bedside, reviewing labs and radiographs, reviewing updated notes and consultations, discussing with staff and family was more than 35 minutes. Please note that voice recognition technology was used in the preparation of this note and make therefore it may contain inadvertent transcription errors. If the patient is a COVID 19 isolation patient, the above physical exam reflects that of the examining physician for the day. Amadou Ortega MD,  M.D., F.C.C.P.     [facility], [address] [facility], [address] [place] [address], [facility]

## 2021-10-31 ENCOUNTER — APPOINTMENT (OUTPATIENT)
Dept: GENERAL RADIOLOGY | Age: 81
DRG: 871 | End: 2021-10-31
Payer: MEDICARE

## 2021-10-31 LAB
ALBUMIN SERPL-MCNC: 3.4 G/DL (ref 3.5–5.2)
ALP BLD-CCNC: 107 U/L (ref 35–104)
ALT SERPL-CCNC: 14 U/L (ref 0–32)
ANION GAP SERPL CALCULATED.3IONS-SCNC: 7 MMOL/L (ref 7–16)
AST SERPL-CCNC: 12 U/L (ref 0–31)
BASOPHILS ABSOLUTE: 0.03 E9/L (ref 0–0.2)
BASOPHILS RELATIVE PERCENT: 0.2 % (ref 0–2)
BILIRUB SERPL-MCNC: <0.2 MG/DL (ref 0–1.2)
BUN BLDV-MCNC: 26 MG/DL (ref 6–23)
CALCIUM SERPL-MCNC: 8.7 MG/DL (ref 8.6–10.2)
CHLORIDE BLD-SCNC: 106 MMOL/L (ref 98–107)
CO2: 30 MMOL/L (ref 22–29)
CREAT SERPL-MCNC: 0.9 MG/DL (ref 0.5–1)
EOSINOPHILS ABSOLUTE: 0 E9/L (ref 0.05–0.5)
EOSINOPHILS RELATIVE PERCENT: 0 % (ref 0–6)
GFR AFRICAN AMERICAN: >60
GFR NON-AFRICAN AMERICAN: >60 ML/MIN/1.73
GLUCOSE BLD-MCNC: 112 MG/DL (ref 74–99)
HCT VFR BLD CALC: 41 % (ref 34–48)
HEMOGLOBIN: 12.7 G/DL (ref 11.5–15.5)
IMMATURE GRANULOCYTES #: 0.26 E9/L
IMMATURE GRANULOCYTES %: 1.9 % (ref 0–5)
INR BLD: 2
LYMPHOCYTES ABSOLUTE: 1.22 E9/L (ref 1.5–4)
LYMPHOCYTES RELATIVE PERCENT: 9 % (ref 20–42)
MAGNESIUM: 1.8 MG/DL (ref 1.6–2.6)
MCH RBC QN AUTO: 29.4 PG (ref 26–35)
MCHC RBC AUTO-ENTMCNC: 31 % (ref 32–34.5)
MCV RBC AUTO: 94.9 FL (ref 80–99.9)
MONOCYTES ABSOLUTE: 0.99 E9/L (ref 0.1–0.95)
MONOCYTES RELATIVE PERCENT: 7.3 % (ref 2–12)
NEUTROPHILS ABSOLUTE: 11.02 E9/L (ref 1.8–7.3)
NEUTROPHILS RELATIVE PERCENT: 81.6 % (ref 43–80)
PDW BLD-RTO: 14.3 FL (ref 11.5–15)
PHOSPHORUS: 3 MG/DL (ref 2.5–4.5)
PLATELET # BLD: 397 E9/L (ref 130–450)
PMV BLD AUTO: 10.4 FL (ref 7–12)
POTASSIUM SERPL-SCNC: 4.1 MMOL/L (ref 3.5–5)
PROTHROMBIN TIME: 21.6 SEC (ref 9.3–12.4)
RBC # BLD: 4.32 E12/L (ref 3.5–5.5)
SODIUM BLD-SCNC: 143 MMOL/L (ref 132–146)
TOTAL PROTEIN: 6 G/DL (ref 6.4–8.3)
WBC # BLD: 13.5 E9/L (ref 4.5–11.5)

## 2021-10-31 PROCEDURE — 80053 COMPREHEN METABOLIC PANEL: CPT

## 2021-10-31 PROCEDURE — 71045 X-RAY EXAM CHEST 1 VIEW: CPT

## 2021-10-31 PROCEDURE — 85025 COMPLETE CBC W/AUTO DIFF WBC: CPT

## 2021-10-31 PROCEDURE — 1200000000 HC SEMI PRIVATE

## 2021-10-31 PROCEDURE — 6360000002 HC RX W HCPCS: Performed by: PHYSICIAN ASSISTANT

## 2021-10-31 PROCEDURE — 36415 COLL VENOUS BLD VENIPUNCTURE: CPT

## 2021-10-31 PROCEDURE — 6370000000 HC RX 637 (ALT 250 FOR IP): Performed by: PHYSICIAN ASSISTANT

## 2021-10-31 PROCEDURE — 2700000000 HC OXYGEN THERAPY PER DAY

## 2021-10-31 PROCEDURE — 6370000000 HC RX 637 (ALT 250 FOR IP): Performed by: STUDENT IN AN ORGANIZED HEALTH CARE EDUCATION/TRAINING PROGRAM

## 2021-10-31 PROCEDURE — 83735 ASSAY OF MAGNESIUM: CPT

## 2021-10-31 PROCEDURE — 94640 AIRWAY INHALATION TREATMENT: CPT

## 2021-10-31 PROCEDURE — 6370000000 HC RX 637 (ALT 250 FOR IP): Performed by: INTERNAL MEDICINE

## 2021-10-31 PROCEDURE — 2580000003 HC RX 258: Performed by: EMERGENCY MEDICINE

## 2021-10-31 PROCEDURE — 2580000003 HC RX 258

## 2021-10-31 PROCEDURE — 2580000003 HC RX 258: Performed by: INTERNAL MEDICINE

## 2021-10-31 PROCEDURE — C9113 INJ PANTOPRAZOLE SODIUM, VIA: HCPCS | Performed by: INTERNAL MEDICINE

## 2021-10-31 PROCEDURE — 6360000002 HC RX W HCPCS: Performed by: STUDENT IN AN ORGANIZED HEALTH CARE EDUCATION/TRAINING PROGRAM

## 2021-10-31 PROCEDURE — 84100 ASSAY OF PHOSPHORUS: CPT

## 2021-10-31 PROCEDURE — 94660 CPAP INITIATION&MGMT: CPT

## 2021-10-31 PROCEDURE — 85610 PROTHROMBIN TIME: CPT

## 2021-10-31 PROCEDURE — 6360000002 HC RX W HCPCS: Performed by: INTERNAL MEDICINE

## 2021-10-31 RX ADMIN — ARFORMOTEROL TARTRATE 15 MCG: 15 SOLUTION RESPIRATORY (INHALATION) at 22:01

## 2021-10-31 RX ADMIN — ARFORMOTEROL TARTRATE 15 MCG: 15 SOLUTION RESPIRATORY (INHALATION) at 09:21

## 2021-10-31 RX ADMIN — ENOXAPARIN SODIUM 90 MG: 100 INJECTION SUBCUTANEOUS at 22:22

## 2021-10-31 RX ADMIN — LISINOPRIL 20 MG: 20 TABLET ORAL at 08:50

## 2021-10-31 RX ADMIN — IPRATROPIUM BROMIDE AND ALBUTEROL SULFATE 1 AMPULE: .5; 2.5 SOLUTION RESPIRATORY (INHALATION) at 22:01

## 2021-10-31 RX ADMIN — SODIUM CHLORIDE, PRESERVATIVE FREE 10 ML: 5 INJECTION INTRAVENOUS at 08:49

## 2021-10-31 RX ADMIN — METHYLPREDNISOLONE SODIUM SUCCINATE 40 MG: 40 INJECTION, POWDER, LYOPHILIZED, FOR SOLUTION INTRAMUSCULAR; INTRAVENOUS at 22:22

## 2021-10-31 RX ADMIN — SODIUM CHLORIDE, PRESERVATIVE FREE 10 ML: 5 INJECTION INTRAVENOUS at 22:22

## 2021-10-31 RX ADMIN — AMLODIPINE BESYLATE 2.5 MG: 2.5 TABLET ORAL at 08:50

## 2021-10-31 RX ADMIN — PANTOPRAZOLE SODIUM 40 MG: 40 INJECTION, POWDER, FOR SOLUTION INTRAVENOUS at 08:49

## 2021-10-31 RX ADMIN — WARFARIN SODIUM 7.5 MG: 7.5 TABLET ORAL at 17:32

## 2021-10-31 RX ADMIN — SODIUM CHLORIDE, PRESERVATIVE FREE 10 ML: 5 INJECTION INTRAVENOUS at 08:50

## 2021-10-31 RX ADMIN — ENOXAPARIN SODIUM 90 MG: 100 INJECTION SUBCUTANEOUS at 08:50

## 2021-10-31 RX ADMIN — WATER 10 ML: 1 INJECTION INTRAMUSCULAR; INTRAVENOUS; SUBCUTANEOUS at 22:22

## 2021-10-31 RX ADMIN — DOCUSATE SODIUM 100 MG: 50 LIQUID ORAL at 10:16

## 2021-10-31 RX ADMIN — IPRATROPIUM BROMIDE AND ALBUTEROL SULFATE 1 AMPULE: .5; 2.5 SOLUTION RESPIRATORY (INHALATION) at 09:20

## 2021-10-31 RX ADMIN — METHYLPREDNISOLONE SODIUM SUCCINATE 40 MG: 40 INJECTION, POWDER, LYOPHILIZED, FOR SOLUTION INTRAMUSCULAR; INTRAVENOUS at 08:49

## 2021-10-31 RX ADMIN — LISINOPRIL 20 MG: 20 TABLET ORAL at 17:32

## 2021-10-31 ASSESSMENT — PAIN SCALES - GENERAL: PAINLEVEL_OUTOF10: 0

## 2021-10-31 NOTE — PROGRESS NOTES
Subjective: The patient is awake and alert. Lying in bed without complaints  No acute events overnight. Denies chest pain, angina, SOB    She indicates she ambulated today in her room and took a shower  Feels well and is back at her baseline oxygen requirement of 2 L      Objective:    BP (!) 150/84   Pulse 89   Temp 97.9 °F (36.6 °C) (Oral)   Resp 18   Ht 5' 5\" (1.651 m)   Wt 223 lb (101.2 kg)   SpO2 93%   BMI 37.11 kg/m²     In: 360 [P.O.:360]  Out: 100   In: 360   Out: 100 [Urine:100]    General appearance: NAD, conversant  HEENT: AT/NC, MMM  Neck: FROM, supple  Lungs: Diminshed   2 liters O2  CV: RRR, no MRGs  Vasc: Radial pulses 2+  Abdomen: Soft, non-tender; no masses or HSM  Extremities: No peripheral edema or digital cyanosis  Skin: no rash, lesions or ulcers  Psych: Alert and oriented to person, place and time  Neuro: Alert and interactive     Recent Labs     10/29/21  1242 10/30/21  0752 10/31/21  0728   WBC 13.3* 13.9* 13.5*   HGB 13.4 13.7 12.7   HCT 43.1 44.8 41.0    422 397       Recent Labs     10/29/21  1242 10/30/21  0752 10/31/21  0728    144 143   K 4.1 3.8 4.1    103 106   CO2 27 29 30*   BUN 25* 22 26*   CREATININE 0.8 0.7 0.9   CALCIUM 9.0 8.9 8.7       Assessment:    Principal Problem:    Pneumonia  Active Problems:    HTN (hypertension)    Hypokalemia    Acute respiratory failure with hypercapnia (HCC)  Resolved Problems:    * No resolved hospital problems. *      Plan:    Admitted for acute on chronic hypoxemic respiratory failure from pna   intubation on admission   Extubated 10/27 -   continue abx therapy   pulm following -   iss for dm   Continue coumadin goal inr 2-3   Resume home meds   Weaning steroids    DVT Prophylaxis   PT/OT  Discharge planning -now awaiting placement at skilled nursing facility-awaiting family to pick choice at this-once this is done patient can be discharged from medicine standpoint  Ambulating pulse ox in a.m.       Ramiro Dunne MD  4:04 PM  10/31/2021

## 2021-10-31 NOTE — PLAN OF CARE
Problem: Falls - Risk of:  Goal: Will remain free from falls  Description: Will remain free from falls  10/31/2021 1132 by Pedro Cheng RN  Outcome: Met This Shift  10/31/2021 1132 by Pedro Cheng RN  Outcome: Met This Shift  10/31/2021 0152 by Rio Akhtar RN  Outcome: Ongoing  Goal: Absence of physical injury  Description: Absence of physical injury  10/31/2021 1132 by Pedro Cheng RN  Outcome: Met This Shift  10/31/2021 1132 by Pedro Cheng RN  Outcome: Met This Shift  10/31/2021 0152 by Rio Akhtar RN  Outcome: Ongoing     Problem: Skin Integrity:  Goal: Will show no infection signs and symptoms  Description: Will show no infection signs and symptoms  10/31/2021 1132 by Pedro Cheng RN  Outcome: Met This Shift  10/31/2021 1132 by Pedro Cheng RN  Outcome: Met This Shift  10/31/2021 0152 by Rio Akhtar RN  Outcome: Ongoing  Goal: Absence of new skin breakdown  Description: Absence of new skin breakdown  10/31/2021 1132 by Pedro Cheng RN  Outcome: Met This Shift  10/31/2021 1132 by Pedro Cheng RN  Outcome: Met This Shift  10/31/2021 0152 by Rio Akhtar RN  Outcome: Ongoing     Problem: OXYGENATION/RESPIRATORY FUNCTION  Goal: Patient will achieve/maintain normal respiratory rate/effort  Description: Respiratory rate and effort will be within normal limits for the patient  Outcome: Met This Shift

## 2021-10-31 NOTE — PROGRESS NOTES
Subjective: The patient is awake and alert. Lying in bed without complaints  No acute events overnight. Denies chest pain, angina, SOB       Objective:    BP (!) 143/72   Pulse 96   Temp 97.7 °F (36.5 °C) (Oral)   Resp 20   Ht 5' 5\" (1.651 m)   Wt 224 lb (101.6 kg)   SpO2 94%   BMI 37.28 kg/m²     In: -   Out: 600   In: -   Out: 600 [Urine:600]    General appearance: NAD, conversant  HEENT: AT/NC, MMM  Neck: FROM, supple  Lungs: Diminshed   2 liters O2  CV: RRR, no MRGs  Vasc: Radial pulses 2+  Abdomen: Soft, non-tender; no masses or HSM  Extremities: No peripheral edema or digital cyanosis  Skin: no rash, lesions or ulcers  Psych: Alert and oriented to person, place and time  Neuro: Alert and interactive     Recent Labs     10/28/21  0610 10/29/21  1242 10/30/21  0752   WBC 11.1 13.3* 13.9*   HGB 12.2 13.4 13.7   HCT 39.1 43.1 44.8    417 422       Recent Labs     10/28/21  0610 10/29/21  1242 10/30/21  0752    143 144   K 4.2 4.1 3.8    105 103   CO2 26 27 29   BUN 28* 25* 22   CREATININE 0.7 0.8 0.7   CALCIUM 9.0 9.0 8.9       Assessment:    Principal Problem:    Pneumonia  Active Problems:    HTN (hypertension)    Hypokalemia    Acute respiratory failure with hypercapnia (HCC)  Resolved Problems:    * No resolved hospital problems. *      Plan:    Admitted for acute on chronic hypoxemic respiratory failure from pna   intubation on admission   Extubated 10/27 -   continue abx therapy   pulm following -   iss for dm   Continue coumadin goal inr 2-3   Resume home meds   Weaning steroids    DVT Prophylaxis   PT/OT  Discharge planning - Home hopefully next 24 hours if she continues to do as well as she is doing today  Ambulating pulse ox in a.m.       Breanna Fowler MD  8:27 PM  10/30/2021

## 2021-11-01 ENCOUNTER — APPOINTMENT (OUTPATIENT)
Dept: GENERAL RADIOLOGY | Age: 81
DRG: 871 | End: 2021-11-01
Payer: MEDICARE

## 2021-11-01 LAB
ALBUMIN SERPL-MCNC: 3.3 G/DL (ref 3.5–5.2)
ALP BLD-CCNC: 89 U/L (ref 35–104)
ALT SERPL-CCNC: 20 U/L (ref 0–32)
ANION GAP SERPL CALCULATED.3IONS-SCNC: 9 MMOL/L (ref 7–16)
AST SERPL-CCNC: 15 U/L (ref 0–31)
BASOPHILS ABSOLUTE: 0.03 E9/L (ref 0–0.2)
BASOPHILS RELATIVE PERCENT: 0.2 % (ref 0–2)
BILIRUB SERPL-MCNC: 0.3 MG/DL (ref 0–1.2)
BUN BLDV-MCNC: 22 MG/DL (ref 6–23)
CALCIUM SERPL-MCNC: 8.6 MG/DL (ref 8.6–10.2)
CHLORIDE BLD-SCNC: 102 MMOL/L (ref 98–107)
CO2: 29 MMOL/L (ref 22–29)
CREAT SERPL-MCNC: 0.7 MG/DL (ref 0.5–1)
EOSINOPHILS ABSOLUTE: 0 E9/L (ref 0.05–0.5)
EOSINOPHILS RELATIVE PERCENT: 0 % (ref 0–6)
GFR AFRICAN AMERICAN: >60
GFR NON-AFRICAN AMERICAN: >60 ML/MIN/1.73
GLUCOSE BLD-MCNC: 204 MG/DL (ref 74–99)
HCT VFR BLD CALC: 41 % (ref 34–48)
HEMOGLOBIN: 12.5 G/DL (ref 11.5–15.5)
IMMATURE GRANULOCYTES #: 0.21 E9/L
IMMATURE GRANULOCYTES %: 1.4 % (ref 0–5)
INR BLD: 2.3
LYMPHOCYTES ABSOLUTE: 0.88 E9/L (ref 1.5–4)
LYMPHOCYTES RELATIVE PERCENT: 5.8 % (ref 20–42)
MAGNESIUM: 1.8 MG/DL (ref 1.6–2.6)
MCH RBC QN AUTO: 28.9 PG (ref 26–35)
MCHC RBC AUTO-ENTMCNC: 30.5 % (ref 32–34.5)
MCV RBC AUTO: 94.9 FL (ref 80–99.9)
MONOCYTES ABSOLUTE: 0.44 E9/L (ref 0.1–0.95)
MONOCYTES RELATIVE PERCENT: 2.9 % (ref 2–12)
NEUTROPHILS ABSOLUTE: 13.52 E9/L (ref 1.8–7.3)
NEUTROPHILS RELATIVE PERCENT: 89.7 % (ref 43–80)
PDW BLD-RTO: 14.3 FL (ref 11.5–15)
PHOSPHORUS: 3.2 MG/DL (ref 2.5–4.5)
PLATELET # BLD: 384 E9/L (ref 130–450)
PMV BLD AUTO: 10.6 FL (ref 7–12)
POTASSIUM SERPL-SCNC: 4 MMOL/L (ref 3.5–5)
PROTHROMBIN TIME: 24.9 SEC (ref 9.3–12.4)
RBC # BLD: 4.32 E12/L (ref 3.5–5.5)
SODIUM BLD-SCNC: 140 MMOL/L (ref 132–146)
TOTAL PROTEIN: 6 G/DL (ref 6.4–8.3)
WBC # BLD: 15.1 E9/L (ref 4.5–11.5)

## 2021-11-01 PROCEDURE — 6370000000 HC RX 637 (ALT 250 FOR IP): Performed by: INTERNAL MEDICINE

## 2021-11-01 PROCEDURE — 80053 COMPREHEN METABOLIC PANEL: CPT

## 2021-11-01 PROCEDURE — 6360000002 HC RX W HCPCS: Performed by: PHYSICIAN ASSISTANT

## 2021-11-01 PROCEDURE — 85025 COMPLETE CBC W/AUTO DIFF WBC: CPT

## 2021-11-01 PROCEDURE — 6360000002 HC RX W HCPCS: Performed by: STUDENT IN AN ORGANIZED HEALTH CARE EDUCATION/TRAINING PROGRAM

## 2021-11-01 PROCEDURE — 2700000000 HC OXYGEN THERAPY PER DAY

## 2021-11-01 PROCEDURE — 6360000002 HC RX W HCPCS: Performed by: INTERNAL MEDICINE

## 2021-11-01 PROCEDURE — C9113 INJ PANTOPRAZOLE SODIUM, VIA: HCPCS | Performed by: INTERNAL MEDICINE

## 2021-11-01 PROCEDURE — 2580000003 HC RX 258: Performed by: EMERGENCY MEDICINE

## 2021-11-01 PROCEDURE — 71045 X-RAY EXAM CHEST 1 VIEW: CPT

## 2021-11-01 PROCEDURE — 1200000000 HC SEMI PRIVATE

## 2021-11-01 PROCEDURE — 6370000000 HC RX 637 (ALT 250 FOR IP): Performed by: PHYSICIAN ASSISTANT

## 2021-11-01 PROCEDURE — 2580000003 HC RX 258: Performed by: INTERNAL MEDICINE

## 2021-11-01 PROCEDURE — 97530 THERAPEUTIC ACTIVITIES: CPT

## 2021-11-01 PROCEDURE — 84100 ASSAY OF PHOSPHORUS: CPT

## 2021-11-01 PROCEDURE — 94640 AIRWAY INHALATION TREATMENT: CPT

## 2021-11-01 PROCEDURE — 36415 COLL VENOUS BLD VENIPUNCTURE: CPT

## 2021-11-01 PROCEDURE — 97110 THERAPEUTIC EXERCISES: CPT

## 2021-11-01 PROCEDURE — 85610 PROTHROMBIN TIME: CPT

## 2021-11-01 PROCEDURE — 83735 ASSAY OF MAGNESIUM: CPT

## 2021-11-01 PROCEDURE — 97535 SELF CARE MNGMENT TRAINING: CPT

## 2021-11-01 PROCEDURE — 94660 CPAP INITIATION&MGMT: CPT

## 2021-11-01 RX ORDER — PREDNISONE 20 MG/1
20 TABLET ORAL DAILY
Qty: 5 TABLET | Refills: 0 | Status: SHIPPED | OUTPATIENT
Start: 2021-11-01 | End: 2021-11-05 | Stop reason: SDUPTHER

## 2021-11-01 RX ADMIN — AMLODIPINE BESYLATE 2.5 MG: 2.5 TABLET ORAL at 09:49

## 2021-11-01 RX ADMIN — PANTOPRAZOLE SODIUM 40 MG: 40 INJECTION, POWDER, FOR SOLUTION INTRAVENOUS at 09:48

## 2021-11-01 RX ADMIN — SODIUM CHLORIDE, PRESERVATIVE FREE 10 ML: 5 INJECTION INTRAVENOUS at 09:48

## 2021-11-01 RX ADMIN — IPRATROPIUM BROMIDE AND ALBUTEROL SULFATE 1 AMPULE: .5; 2.5 SOLUTION RESPIRATORY (INHALATION) at 15:10

## 2021-11-01 RX ADMIN — ARFORMOTEROL TARTRATE 15 MCG: 15 SOLUTION RESPIRATORY (INHALATION) at 18:33

## 2021-11-01 RX ADMIN — IPRATROPIUM BROMIDE AND ALBUTEROL SULFATE 1 AMPULE: .5; 2.5 SOLUTION RESPIRATORY (INHALATION) at 07:42

## 2021-11-01 RX ADMIN — ENOXAPARIN SODIUM 90 MG: 100 INJECTION SUBCUTANEOUS at 09:49

## 2021-11-01 RX ADMIN — IPRATROPIUM BROMIDE AND ALBUTEROL SULFATE 1 AMPULE: .5; 2.5 SOLUTION RESPIRATORY (INHALATION) at 18:33

## 2021-11-01 RX ADMIN — ARFORMOTEROL TARTRATE 15 MCG: 15 SOLUTION RESPIRATORY (INHALATION) at 07:41

## 2021-11-01 RX ADMIN — METHYLPREDNISOLONE SODIUM SUCCINATE 40 MG: 40 INJECTION, POWDER, LYOPHILIZED, FOR SOLUTION INTRAMUSCULAR; INTRAVENOUS at 21:21

## 2021-11-01 RX ADMIN — LISINOPRIL 20 MG: 20 TABLET ORAL at 18:28

## 2021-11-01 RX ADMIN — IPRATROPIUM BROMIDE AND ALBUTEROL SULFATE 1 AMPULE: .5; 2.5 SOLUTION RESPIRATORY (INHALATION) at 12:08

## 2021-11-01 RX ADMIN — ENOXAPARIN SODIUM 90 MG: 100 INJECTION SUBCUTANEOUS at 21:21

## 2021-11-01 RX ADMIN — WARFARIN SODIUM 10 MG: 10 TABLET ORAL at 18:28

## 2021-11-01 RX ADMIN — SODIUM CHLORIDE, PRESERVATIVE FREE 10 ML: 5 INJECTION INTRAVENOUS at 21:22

## 2021-11-01 RX ADMIN — SODIUM CHLORIDE, PRESERVATIVE FREE 5 ML: 5 INJECTION INTRAVENOUS at 09:51

## 2021-11-01 RX ADMIN — LISINOPRIL 20 MG: 20 TABLET ORAL at 09:49

## 2021-11-01 RX ADMIN — METHYLPREDNISOLONE SODIUM SUCCINATE 40 MG: 40 INJECTION, POWDER, LYOPHILIZED, FOR SOLUTION INTRAMUSCULAR; INTRAVENOUS at 09:48

## 2021-11-01 ASSESSMENT — PAIN SCALES - GENERAL: PAINLEVEL_OUTOF10: 0

## 2021-11-01 NOTE — CARE COORDINATION
Spoke with patient at bedside and patient's daughter via phone. After discussion and review of therapy scores, plans are for a discharge to home. Patient resides with daughter. She has no interest in a short term rehab stay. Patient is agreeable to  [facility] and after choices provided, selected [facility]. Referral to [facility]. Agency is out of network  Next choice - [facility], referral to [facility]. Explained ELOS of <24 hours; patient voiced understanding and agreement. Kerri Amanda.  Rik, MSN, RN  [facility] Case Management  [phone]

## 2021-11-01 NOTE — PROGRESS NOTES
Physical Therapy    Facility/Department: [facility] MED SURG  Treatment note    NAME: Geraldo Stephenson  : 1940  MRN: [id number]    Date of Service: 2021      Attending Provider:  Lilia Martinez MD    Evaluating PT:  Hue Hamlin, P.T. Room #:  0510/0510-A  Diagnosis:  Pneumonia [J18.9]  Precautions:  Falls    SUBJECTIVE:    Pt lives with her daughter in a 1 story home with 2 stairs and 1 rail to enter. Pt ambulated with a ww PTA. Pt uses 2L home O2. OBJECTIVE:   Initial Evaluation  Date: 10/29/21 Treatment  21 Short Term/ Long Term   Goals   Was pt agreeable to Eval/treatment? yes yes    Does pt have pain? No c/o pain No complaints    Bed Mobility  Rolling: SBA  Supine to sit: MOD A  Sit to supine: NA  Scooting: MIN A Rolling: SBA  Supine to sit: SBA  Scooting: Min A seated to EOB Independent    Transfers Sit to stand: MIN A  Stand to sit: MIN A  Stand pivot: MIN A with ww Sit <> stand Min A Independent    Ambulation   15 feet x2 with ww MIN A 35 feet x 1 using [employer] for support Min A for balance 75 feet with ww supervision   Stair negotiation: ascended and descended NA  2 steps with 1 rail SBA   AM-PAC 6 Clicks 93/99 48/52        Pt is alert and able to follow instruction  Balance: poor dynamic using [employer] for support    Pt performed therapeutic exercise of the following: seated B ankle pumps AROM x 30: B LAQ's/hamstring curls, hip ABd/ADd and heel slide motions with minimal manual resistance x 20    Patient education  Pt was educated on exercise promoting circulation and strengthening, UE usage to assist with transfers, gait promoting staying within [employer] base of support while turning    Patient response to education:   Pt verbalized understanding Pt demonstrated skill Pt requires further education in this area   yes With instruction yes     ASSESSMENT:   Comments: Nurse ok with Rx. Pt found in bed, agreed to rx after encouragement. Pt assisted to EOB, sat EOB SBA.  Gait performed in the room, mary slow and inconsistent. Pt unsteady throughout, required constant hands on assist for balance and safety. Once to the chair, LE exercise performed. Pt on 2 L 02 NC all Rx, no shortness of breath noted. Treatment: Pt practiced and was instructed in the following treatment: therapeutic exercise, transfer safety, gait mechanis    Pt was left in a bedside chair with call light in reach, waffle cushion in place. Chair/bed alarm: chair alarm active    Time in 1030   Time out 1055   Total Treatment Time 25 minutes   CPT codes:     Therapeutic activities 43582 12 minutes   Therapeutic exercises 76778 13 minutes       Pt is making good progress toward established Physical Therapy goals. Continue with physical therapy current plan of care.     Kenyatta Pang PTA   License Number: [id number]

## 2021-11-01 NOTE — PROGRESS NOTES
Subjective: The patient is awake and alert. Lying in bed without complaints  No acute events overnight. Denies chest pain, angina, SOB    She indicates she ambulated today in her room and took a shower  Feels well and is back at her baseline oxygen requirement of 2 L      Objective:    BP (!) 123/58   Pulse 89   Temp 98.7 °F (37.1 °C) (Oral)   Resp 18   Ht 5' 5\" (1.651 m)   Wt 218 lb (98.9 kg)   SpO2 98%   BMI 36.28 kg/m²     In: 420 [P.O.:420]  Out: 600   In: 420   Out: 600 [Urine:600]    General appearance: NAD, conversant  HEENT: AT/NC, MMM  Neck: FROM, supple  Lungs: Diminshed   2 liters O2  CV: RRR, no MRGs  Vasc: Radial pulses 2+  Abdomen: Soft, non-tender; no masses or HSM  Extremities: No peripheral edema or digital cyanosis  Skin: no rash, lesions or ulcers  Psych: Alert and oriented to person, place and time  Neuro: Alert and interactive     Recent Labs     10/30/21  0752 10/31/21  0728 11/01/21  0605   WBC 13.9* 13.5* 15.1*   HGB 13.7 12.7 12.5   HCT 44.8 41.0 41.0    397 384       Recent Labs     10/30/21  0752 10/31/21  0728 11/01/21  0605    143 140   K 3.8 4.1 4.0    106 102   CO2 29 30* 29   BUN 22 26* 22   CREATININE 0.7 0.9 0.7   CALCIUM 8.9 8.7 8.6       Assessment:    Principal Problem:    Pneumonia  Active Problems:    HTN (hypertension)    Hypokalemia    Acute respiratory failure with hypercapnia (HCC)  Resolved Problems:    * No resolved hospital problems. *      Plan:    Admitted for acute on chronic hypoxemic respiratory failure from pna   intubation on admission   Extubated 10/27 -   continue abx therapy   pulm following -   iss for dm   Continue coumadin goal inr 2-3   Resume home meds   Weaning steroids    DVT Prophylaxis   PT/OT    Discharge planning -now awaiting placement at skilled nursing facility-awaiting family to pick choice at this-once this is done patient can be discharged from medicine standpoint  Ambulating pulse ox in a.m.       Lilia Martinez MD  5:12 PM  11/1/2021     Above note edited to reflect my thoughts   Arrangements being made for home v noninvasive ventilation  Patient can be discharged once this is set up    I personally saw, examined and provided care for the patient. Radiographs, labs and medication list were reviewed by me independently. The case was discussed in detail and plans for care were established. Review of [address] [place] [address], documentation was conducted and revisions were made as appropriate directly by me. I agree with the above documented exam, problem list, and plan of care.      Ramiro Dunne MD  5:12 PM  11/1/2021

## 2021-11-01 NOTE — PROGRESS NOTES
Occupational Therapy  OT BEDSIDE TREATMENT NOTE      Date:2021  Patient Name: Prosper Blandon  MRN: [id number]  : 1940  Room: [address], OTR/L #[id number]     Referring Provider: Rigo Marinelli DO  Specific Provider Orders/Date: OT eval and treat 10/28/21     Diagnosis: Pneumonia [J18.9]   Pt admitted to hospital on 10/25/21 for SOB, cough     RRT on 10/25 for respiratory distress  Extubated on 10/27 to [facility]      Pertinent Medical History:  has a past medical history of COPD (chronic obstructive pulmonary disease) ([facility] [address] DVT (deep venous thrombosis) ([facility] [address] Hypertension, and Pulmonary embolism ([facility] [address]       Precautions:  Fall Risk,  O2      Assessment of current deficits    [x]? Functional mobility         [x]? ADLs           [x]? Strength                  []?Cognition    [x]? Functional transfers       [x]? IADLs         [x]? Safety Awareness   [x]? Endurance    []? Fine Coordination                      [x]? Balance      []? Vision/perception   []? Sensation      []? Gross Motor Coordination          []? ROM           []?  Delirium                   []? Motor Control      OT PLAN OF CARE   OT POC based on physician orders, patient diagnosis and results of clinical assessment     Frequency/Duration 1-3 days/wk for 2 weeks PRN   Specific OT Treatment Interventions to include:   * Instruction/training on adapted ADL techniques and AE recommendations to increase functional independence within precautions       * Training on energy conservation strategies, correct breathing pattern and techniques to improve independence/tolerance for self-care routine  * Functional transfer/mobility training/DME recommendations for increased independence, safety, and fall prevention  * Patient/Family education to increase follow through with safety techniques and functional independence  * Recommendation of environmental modifications for increased safety with functional transfers/mobility and ADLs  * Therapeutic exercise to improve motor endurance, ROM, and functional strength for ADLs/functional transfers  * Therapeutic activities to facilitate/challenge dynamic balance, stand tolerance for increased safety and independence with ADLs  * Therapeutic activities to facilitate gross/fine motor skills for increased independence with ADLs        Recommended Adaptive Equipment: TBD      Home Living: Pt lives with daughter (who works) in 2 floor home. 2 RAJIV, 1 handrail   1st floor setup   Bathroom setup: tub/shower with grab bars and shower chair    Equipment owned: w/w, home O2     Prior Level of Function: assist PRN with ADLs , assistance with IADLs; ambulated w/ w/w   Driving: no - daughter assists     Pain Level: Pt did not report pain during this session.      Cognition: Awake and alert. Limited safety awareness. Limited safety judgement. Functional Assessment:  AM-PAC Daily Activity Raw Score: 15/24    Initial Eval Status  Date: 10/29/21 Treatment Status  Date:11/1/21  STGs = LTGs  Time frame: 10-14 days   Feeding Supervision    Modified [place]   Grooming Minimal Assist   Standing at sink Min A   Modified [place]    UB Dressing Stand by Assist  Min  A  Modified [place]    LB Dressing Maximal Assist   B socks Mod A     Stand by Assist    Bathing Moderate Assist  Simulated   Stand by Assist    Toileting Moderate Assist  Assist for toilet hygiene.   Stand by Assist    Bed Mobility  Supine to sit: Minimal Assist   Sit to supine: Minimal Assist    Supine to sit: Modified [place]   Sit to supine: Modified [place]    Functional Transfers Minimal Assist   CGA for safety. Supervision    Functional Mobility Minimal Assist w/ w/w  EOB><bathroom  CGA using w/w in room setting.   Supervision    Balance Sitting:     Static:  Supervision    Dynamic:SBA  Standing: Min A w/ w/w       Activity Tolerance Fair-  Noted fatigue and SOB w/ moderate activity Fair -   Fair+ Comments:  Pt states she has assist from her daughter for dressing and toilet hygiene. States she is going home with her daughter not going to rehab. Pt wanting to return to bed. Encouraged pt to sit up to eat lunch. Pt remained in chair and chair alarm activated. Education/treatment:  ADL retraining with facilitation of movement to increase self care skills. Therapeutic activity to address balance and endurance for ADL and transfers. Pt education of transfer safety and walker safety. · Pt has made  progress towards set goals.    Time In: 11:00   Time Out: 11:11     Min Units   Therapeutic Ex 04067     Therapeutic Activities 18947 4    ADL/Self Care 81868 7 1   Orthotic Management 89068     Neuro Re-Ed 73326     Non-Billable Time     TOTAL TIMED TREATMENT 11 [address] [facility] [place]/L [address]

## 2021-11-01 NOTE — PLAN OF CARE
Problem: Falls - Risk of:  Goal: Will remain free from falls  Description: Will remain free from falls  11/1/2021 0036 by Prateek Danielle RN  Outcome: Ongoing  10/31/2021 1132 by Nga Esquivel RN  Outcome: Met This Shift  10/31/2021 1132 by Nga Esquivel RN  Outcome: Met This Shift  Goal: Absence of physical injury  Description: Absence of physical injury  11/1/2021 0036 by Prateek Danielle RN  Outcome: Ongoing  10/31/2021 1132 by Nga Esquivel RN  Outcome: Met This Shift  10/31/2021 1132 by Nga Esquivel RN  Outcome: Met This Shift     Problem: Skin Integrity:  Goal: Will show no infection signs and symptoms  Description: Will show no infection signs and symptoms  11/1/2021 0036 by Prateek Danielle RN  Outcome: Ongoing  10/31/2021 1132 by Nga Esquivel RN  Outcome: Met This Shift  10/31/2021 1132 by Nga Esquivel RN  Outcome: Met This Shift  Goal: Absence of new skin breakdown  Description: Absence of new skin breakdown  11/1/2021 0036 by Prateek Danielle RN  Outcome: Ongoing  10/31/2021 1132 by Nga Esquivel RN  Outcome: Met This Shift  10/31/2021 1132 by Nga Esquivel RN  Outcome: Met This Shift     Problem: OXYGENATION/RESPIRATORY FUNCTION  Goal: Patient will achieve/maintain normal respiratory rate/effort  Description: Respiratory rate and effort will be within normal limits for the patient  11/1/2021 0036 by Prateek Danielle RN  Outcome: Ongoing  10/31/2021 1132 by Nga Esquivel RN  Outcome: Met This Shift

## 2021-11-01 NOTE — PROGRESS NOTES
Pulmonary Progress Note    Admit Date: 10/25/2021  Hospital day                               PCP: Charis Tsai MD    Chief Complaint (s):  Patient Active Problem List   Diagnosis    Pneumonia due to organism    Chronic respiratory failure with hypoxia (HCC)    COPD exacerbation ([address]    Supratherapeutic INR    FAISAL (obstructive sleep apnea)    Leukocytosis    Dyspnea and respiratory abnormalities    HTN (hypertension)    Tobacco abuse    History of pulmonary embolism    History of DVT (deep vein thrombosis)    Subtherapeutic international normalized ratio (INR)    Chronic anemia    Iron deficiency anemia    Thrombocytosis    Pneumonia    Hypokalemia    Acute respiratory failure with hypercapnia (HCC)       Subjective:  · Far more awake and alert than the patient has been for several days.       Vitals:  VITALS:  BP (!) 123/58   Pulse 89   Temp 98.7 °F (37.1 °C) (Oral)   Resp 18   Ht 5' 5\" (1.651 m)   Wt 218 lb (98.9 kg)   SpO2 98%   BMI 36.28 kg/m²     24HR INTAKE/OUTPUT:      Intake/Output Summary (Last 24 hours) at 2021 1649  Last data filed at 2021 1146  Gross per 24 hour   Intake 780 ml   Output 600 ml   Net 180 ml       24HR PULSE OXIMETRY RANGE:    SpO2  Av %  Min: 94 %  Max: 98 %    Medications:  IV:   sodium chloride         Scheduled Meds:   warfarin  10 mg Oral Once per day on u    warfarin  7.5 mg Oral Once per day on Sun Tue Wed Fri Sat    methylPREDNISolone  40 mg IntraVENous Q12H    enoxaparin  1 mg/kg SubCUTAneous BID    pantoprazole  40 mg IntraVENous Daily    And    sodium chloride (PF)  10 mL IntraVENous Daily    senna  5 mL Oral Nightly    docusate sodium  100 mg Oral Daily    sodium chloride flush  5-40 mL IntraVENous 2 times per day    amLODIPine  2.5 mg Oral Daily    lisinopril  20 mg Oral BID    Arformoterol Tartrate  15 mcg Nebulization BID    influenza virus vaccine  0.5 mL IntraMUSCular Prior to discharge    ipratropium-albuterol  1 ampule Inhalation Q4H WA       Diet:   ADULT DIET; Regular; No Added Salt (3-4 gm)  ADULT ORAL NUTRITION SUPPLEMENT; Lunch, Dinner; Frozen Oral Supplement     EXAM:  General: No distress. Alert. Lying flat  Eyes: PERRL. No sclera icterus. No conjunctival injection. ENT: No discharge. Pharynx clear. Neck: Trachea midline. Normal thyroid. Resp: No accessory muscle use. No rales. No wheezing. No rhonchi. CV: Regular rate. Regular rhythm. No murmur or rub. Abd: Non-tender. Non-distended. No masses. No organomegaly. Normal bowel sounds. Skin: Warm and dry. No nodule on exposed extremities. No rash on exposed extremities. Ext: No cyanosis, clubbing, edema  Lymph: No cervical LAD. No supraclavicular LAD. M/S: No cyanosis. No joint deformity. No clubbing. Neuro: Awake. Follows commands. Positive pupils/gag/corneals. Normal pain response. Results:  CBC:   Recent Labs     10/30/21  0752 10/31/21  0728 11/01/21  0605   WBC 13.9* 13.5* 15.1*   HGB 13.7 12.7 12.5   HCT 44.8 41.0 41.0   MCV 94.7 94.9 94.9    397 384     BMP:   Recent Labs     10/30/21  0752 10/31/21  0728 11/01/21  0605    143 140   K 3.8 4.1 4.0    106 102   CO2 29 30* 29   PHOS 3.1 3.0 3.2   BUN 22 26* 22   CREATININE 0.7 0.9 0.7     LIVER PROFILE:   Recent Labs     10/30/21  0752 10/31/21  0728 11/01/21  0605   AST 12 12 15   ALT 15 14 20   BILITOT 0.2 <0.2 0.3   ALKPHOS 105* 107* 89     PT/INR:   Recent Labs     10/30/21  0752 10/31/21  0728 11/01/21  0605   PROTIME 18.3* 21.6* 24.9*   INR 1.7 2.0 2.3     APTT: No results for input(s): APTT in the last 72 hours. Pathology:  1. N/A      Microbiology:  1. None    Recent ABG:   No results for input(s): PH, PO2, PCO2, HCO3, BE, O2SAT, METHB, O2HB, COHB, O2CON, HHB, THB in the last 72 hours. FiO2 : 40 %  I:E Ratio: 1:2.70    Recent Films:  XR CHEST PORTABLE   Final Result   Linear pleural and/or parenchymal scarring on the right.   Nothing else active. XR CHEST PORTABLE   Final Result   No acute cardiopulmonary disease. XR CHEST PORTABLE   Final Result   No acute process. XR CHEST PORTABLE   Final Result   No acute process. XR CHEST PORTABLE   Final Result   Small left-sided pleural effusion with adjacent left basilar airspace   disease, similar in appearance. Mild asymmetric right-sided airspace disease. Findings may be related to   asymmetric edema or pneumonia. Aeration in the right base is improved   compared to prior. XR CHEST PORTABLE   Final Result   Suboptimal inspiration and suspected atelectasis at the left base without   significant interval change. XR CHEST PORTABLE   Final Result   Suboptimal inspiration with suspected developing atelectasis on the left. XR CHEST PORTABLE   Final Result   1. Endotracheal tube terminates in the distal thoracic trachea. Atherosclerotic disease. Slight interval increase in interstitial markings. Possibly reflects underlying edema or potentially infection. Please   correlate with clinical presentation. 2.  Satisfactory position of the enteric tube. XR ABDOMEN FOR NG/OG/NE TUBE PLACEMENT   Final Result   1. Endotracheal tube terminates in the distal thoracic trachea. Atherosclerotic disease. Slight interval increase in interstitial markings. Possibly reflects underlying edema or potentially infection. Please   correlate with clinical presentation. 2.  Satisfactory position of the enteric tube. XR CHEST (2 VW)   Final Result   1. Left lung base infiltrate             Assessment:  1. Acute hypercapnic respiratory failure: Transferred from the ICU post extubation on 10/27. Using nocturnal ventilation and as needed ventilation. Lungs today are clear  2. History of DVT/PE: On Lovenox transitioning to Coumadin. 3. Pneumonia: Improved. 4. Possible superimposed CHF    Plan:  1.  Continue nocturnal ventilation, this needs to be used during the day as well with any naps. 2. Continue anticoagulation  3. Wean steroids as tolerated  4. The patient will need a home ventilator to prevent readmission and premature death. Time at the bedside, reviewing labs and radiographs, reviewing updated notes and consultations, discussing with staff and family was more than 35 minutes. Please note that voice recognition technology was used in the preparation of this note and make therefore it may contain inadvertent transcription errors. If the patient is a COVID 19 isolation patient, the above physical exam reflects that of the examining physician for the day. Rosa Packer MD,  M.D., F.C.C.P.     [facility], [address] [place] [address], [address] [place] [address]

## 2021-11-02 LAB
ALBUMIN SERPL-MCNC: 3.3 G/DL (ref 3.5–5.2)
ALP BLD-CCNC: 91 U/L (ref 35–104)
ALT SERPL-CCNC: 20 U/L (ref 0–32)
ANION GAP SERPL CALCULATED.3IONS-SCNC: 10 MMOL/L (ref 7–16)
AST SERPL-CCNC: 11 U/L (ref 0–31)
BASOPHILS ABSOLUTE: 0.03 E9/L (ref 0–0.2)
BASOPHILS RELATIVE PERCENT: 0.2 % (ref 0–2)
BILIRUB SERPL-MCNC: 0.2 MG/DL (ref 0–1.2)
BUN BLDV-MCNC: 21 MG/DL (ref 6–23)
CALCIUM SERPL-MCNC: 8.6 MG/DL (ref 8.6–10.2)
CHLORIDE BLD-SCNC: 100 MMOL/L (ref 98–107)
CO2: 29 MMOL/L (ref 22–29)
CREAT SERPL-MCNC: 0.7 MG/DL (ref 0.5–1)
EOSINOPHILS ABSOLUTE: 0 E9/L (ref 0.05–0.5)
EOSINOPHILS RELATIVE PERCENT: 0 % (ref 0–6)
GFR AFRICAN AMERICAN: >60
GFR NON-AFRICAN AMERICAN: >60 ML/MIN/1.73
GLUCOSE BLD-MCNC: 148 MG/DL (ref 74–99)
HCT VFR BLD CALC: 39.7 % (ref 34–48)
HEMOGLOBIN: 12.5 G/DL (ref 11.5–15.5)
IMMATURE GRANULOCYTES #: 0.26 E9/L
IMMATURE GRANULOCYTES %: 2 % (ref 0–5)
INR BLD: 2.3
LYMPHOCYTES ABSOLUTE: 1.11 E9/L (ref 1.5–4)
LYMPHOCYTES RELATIVE PERCENT: 8.5 % (ref 20–42)
MAGNESIUM: 1.9 MG/DL (ref 1.6–2.6)
MCH RBC QN AUTO: 29.5 PG (ref 26–35)
MCHC RBC AUTO-ENTMCNC: 31.5 % (ref 32–34.5)
MCV RBC AUTO: 93.6 FL (ref 80–99.9)
MONOCYTES ABSOLUTE: 0.54 E9/L (ref 0.1–0.95)
MONOCYTES RELATIVE PERCENT: 4.1 % (ref 2–12)
NEUTROPHILS ABSOLUTE: 11.08 E9/L (ref 1.8–7.3)
NEUTROPHILS RELATIVE PERCENT: 85.2 % (ref 43–80)
PDW BLD-RTO: 14.4 FL (ref 11.5–15)
PHOSPHORUS: 3.3 MG/DL (ref 2.5–4.5)
PLATELET # BLD: 364 E9/L (ref 130–450)
PMV BLD AUTO: 10.4 FL (ref 7–12)
POTASSIUM SERPL-SCNC: 4.4 MMOL/L (ref 3.5–5)
PROTHROMBIN TIME: 25.1 SEC (ref 9.3–12.4)
RBC # BLD: 4.24 E12/L (ref 3.5–5.5)
REASON FOR REJECTION: NORMAL
REJECTED TEST: NORMAL
SODIUM BLD-SCNC: 139 MMOL/L (ref 132–146)
TOTAL PROTEIN: 6.1 G/DL (ref 6.4–8.3)
WBC # BLD: 13 E9/L (ref 4.5–11.5)

## 2021-11-02 PROCEDURE — 2700000000 HC OXYGEN THERAPY PER DAY

## 2021-11-02 PROCEDURE — 1200000000 HC SEMI PRIVATE

## 2021-11-02 PROCEDURE — 6360000002 HC RX W HCPCS: Performed by: PHYSICIAN ASSISTANT

## 2021-11-02 PROCEDURE — 94660 CPAP INITIATION&MGMT: CPT

## 2021-11-02 PROCEDURE — 94640 AIRWAY INHALATION TREATMENT: CPT

## 2021-11-02 PROCEDURE — 6370000000 HC RX 637 (ALT 250 FOR IP): Performed by: PHYSICIAN ASSISTANT

## 2021-11-02 PROCEDURE — C9113 INJ PANTOPRAZOLE SODIUM, VIA: HCPCS | Performed by: INTERNAL MEDICINE

## 2021-11-02 PROCEDURE — 6360000002 HC RX W HCPCS: Performed by: STUDENT IN AN ORGANIZED HEALTH CARE EDUCATION/TRAINING PROGRAM

## 2021-11-02 PROCEDURE — 36415 COLL VENOUS BLD VENIPUNCTURE: CPT

## 2021-11-02 PROCEDURE — 84100 ASSAY OF PHOSPHORUS: CPT

## 2021-11-02 PROCEDURE — 6370000000 HC RX 637 (ALT 250 FOR IP): Performed by: INTERNAL MEDICINE

## 2021-11-02 PROCEDURE — 2580000003 HC RX 258: Performed by: EMERGENCY MEDICINE

## 2021-11-02 PROCEDURE — 6360000002 HC RX W HCPCS: Performed by: INTERNAL MEDICINE

## 2021-11-02 PROCEDURE — 83735 ASSAY OF MAGNESIUM: CPT

## 2021-11-02 PROCEDURE — 85025 COMPLETE CBC W/AUTO DIFF WBC: CPT

## 2021-11-02 PROCEDURE — 85610 PROTHROMBIN TIME: CPT

## 2021-11-02 PROCEDURE — 80053 COMPREHEN METABOLIC PANEL: CPT

## 2021-11-02 PROCEDURE — 2580000003 HC RX 258: Performed by: INTERNAL MEDICINE

## 2021-11-02 RX ADMIN — METHYLPREDNISOLONE SODIUM SUCCINATE 40 MG: 40 INJECTION, POWDER, LYOPHILIZED, FOR SOLUTION INTRAMUSCULAR; INTRAVENOUS at 20:06

## 2021-11-02 RX ADMIN — ENOXAPARIN SODIUM 90 MG: 100 INJECTION SUBCUTANEOUS at 09:04

## 2021-11-02 RX ADMIN — PANTOPRAZOLE SODIUM 40 MG: 40 INJECTION, POWDER, FOR SOLUTION INTRAVENOUS at 09:04

## 2021-11-02 RX ADMIN — ENOXAPARIN SODIUM 90 MG: 100 INJECTION SUBCUTANEOUS at 20:05

## 2021-11-02 RX ADMIN — IPRATROPIUM BROMIDE AND ALBUTEROL SULFATE 1 AMPULE: .5; 2.5 SOLUTION RESPIRATORY (INHALATION) at 09:13

## 2021-11-02 RX ADMIN — IPRATROPIUM BROMIDE AND ALBUTEROL SULFATE 1 AMPULE: .5; 2.5 SOLUTION RESPIRATORY (INHALATION) at 20:15

## 2021-11-02 RX ADMIN — WARFARIN SODIUM 7.5 MG: 7.5 TABLET ORAL at 18:10

## 2021-11-02 RX ADMIN — ARFORMOTEROL TARTRATE 15 MCG: 15 SOLUTION RESPIRATORY (INHALATION) at 09:14

## 2021-11-02 RX ADMIN — SODIUM CHLORIDE, PRESERVATIVE FREE 10 ML: 5 INJECTION INTRAVENOUS at 20:06

## 2021-11-02 RX ADMIN — AMLODIPINE BESYLATE 2.5 MG: 2.5 TABLET ORAL at 09:03

## 2021-11-02 RX ADMIN — IPRATROPIUM BROMIDE AND ALBUTEROL SULFATE 1 AMPULE: .5; 2.5 SOLUTION RESPIRATORY (INHALATION) at 15:52

## 2021-11-02 RX ADMIN — IPRATROPIUM BROMIDE AND ALBUTEROL SULFATE 1 AMPULE: .5; 2.5 SOLUTION RESPIRATORY (INHALATION) at 12:11

## 2021-11-02 RX ADMIN — LISINOPRIL 20 MG: 20 TABLET ORAL at 18:09

## 2021-11-02 RX ADMIN — ARFORMOTEROL TARTRATE 15 MCG: 15 SOLUTION RESPIRATORY (INHALATION) at 20:15

## 2021-11-02 RX ADMIN — LISINOPRIL 20 MG: 20 TABLET ORAL at 09:04

## 2021-11-02 RX ADMIN — SODIUM CHLORIDE, PRESERVATIVE FREE 5 ML: 5 INJECTION INTRAVENOUS at 09:04

## 2021-11-02 RX ADMIN — SODIUM CHLORIDE, PRESERVATIVE FREE 10 ML: 5 INJECTION INTRAVENOUS at 09:06

## 2021-11-02 RX ADMIN — METHYLPREDNISOLONE SODIUM SUCCINATE 40 MG: 40 INJECTION, POWDER, LYOPHILIZED, FOR SOLUTION INTRAMUSCULAR; INTRAVENOUS at 09:04

## 2021-11-02 NOTE — PROGRESS NOTES
Date: 11/1/2021    Time: 10:06 PM    Patient Placed On BIPAP/CPAP/ Non-Invasive Ventilation? Yes    If no must comment. Facial area red/color change? No           If YES are Blister/Lesion present? No   If yes must notify nursing staff  BIPAP/CPAP skin barrier? Yes    Skin barrier type:mepilexlite       Comments:  Patient placed on bipap per order.       Lorenzo Moore RCP

## 2021-11-02 NOTE — CARE COORDINATION
[facility] agencies called thus far are out of network  Call placed to [facility] - they do not provide pt/ot services in the home  Call then placed to [address], spoke with Kaylynn Fuentes. [facility] requested demo's, [facility] order, pt/ot notes , progress notes faxed to [phone]. Same completed. Will await call back with additional direction. Boyd Pallas.  Rik, MSN, RN  [facility] Case Management  [phone]

## 2021-11-02 NOTE — CARE COORDINATION
Pulmonology notes reviewed  Call placed to [facility], liaison for [facility] [place], patient's current DME provider. She will perform chart review and obtain necessary documentation for home going non-invasive ventilator. She will subsequently fax script/order form to unit for physician signature. Filemon Jolly, MSN, RN  [facility] Case Management  [phone]    NIV order form received from [facility]. Presented to Dr. Brady Robbins. Signed and faxed back to [facility]. Per Sandy, will submit for auth and deliver equipment once auth received. Filemon Jolly, MSN, RN  [facility] Case Management  [phone]

## 2021-11-02 NOTE — PROGRESS NOTES
Subjective: The patient is awake and alert. Lying in bed without complaints  No acute events overnight. Denies chest pain, angina, SOB    Patient back to baseline of O2 requirements      Objective:    BP (!) 141/63   Pulse 82   Temp 98.5 °F (36.9 °C) (Oral)   Resp 16   Ht 5' 5\" (1.651 m)   Wt 218 lb (98.9 kg)   SpO2 98%   BMI 36.28 kg/m²     In: 420 [P.O.:420]  Out: 400   In: 420   Out: 400 [Urine:400]    General appearance: NAD, conversant  HEENT: AT/NC, MMM  Neck: FROM, supple  Lungs: Diminshed   2 liters O2  CV: RRR, no MRGs  Vasc: Radial pulses 2+  Abdomen: Soft, non-tender; no masses or HSM  Extremities: No peripheral edema or digital cyanosis  Skin: no rash, lesions or ulcers  Psych: Alert and oriented to person, place and time  Neuro: Alert and interactive     Recent Labs     10/31/21  0728 11/01/21  0605 11/02/21  0303   WBC 13.5* 15.1* 13.0*   HGB 12.7 12.5 12.5   HCT 41.0 41.0 39.7    384 364       Recent Labs     10/31/21  0728 11/01/21  0605 11/02/21  0303    140 139   K 4.1 4.0 4.4    102 100   CO2 30* 29 29   BUN 26* 22 21   CREATININE 0.9 0.7 0.7   CALCIUM 8.7 8.6 8.6       Assessment:    Principal Problem:    Pneumonia  Active Problems:    HTN (hypertension)    Hypokalemia    Acute respiratory failure with hypercapnia (HCC)  Resolved Problems:    * No resolved hospital problems. *      Plan:    Admitted for acute on chronic hypoxemic respiratory failure from pna   intubation on admission   Extubated 10/27 -   continue abx therapy   pulm following -   iss for dm   Continue coumadin goal inr 2-3 - today 2.3  Resume home meds   Weaning steroids - completed    DVT Prophylaxis   PT/OT  Discharge planning - patient will discharge home once AVAPS has been set up at home. ? She is ready for dc home -     YOSEPH Mahoney CNP  11:45 AM  11/2/2021     Above note edited to reflect my thoughts       I personally saw, examined and provided care for the patient. Radiographs, labs and medication list were reviewed by me independently. The case was discussed in detail and plans for care were established. Review of [address] [place] [address], documentation was conducted and revisions were made as appropriate directly by me. I agree with the above documented exam, problem list, and plan of care.      Prema Chen MD  6:22 PM  11/2/2021

## 2021-11-02 NOTE — PROGRESS NOTES
Pulmonary Progress Note    Admit Date: 10/25/2021  Hospital day                               PCP: Carol Cristobal MD    Chief Complaint (s):  Patient Active Problem List   Diagnosis    Pneumonia due to organism    Chronic respiratory failure with hypoxia (HCC)    COPD exacerbation ([address]    Supratherapeutic INR    FAISAL (obstructive sleep apnea)    Leukocytosis    Dyspnea and respiratory abnormalities    HTN (hypertension)    Tobacco abuse    History of pulmonary embolism    History of DVT (deep vein thrombosis)    Subtherapeutic international normalized ratio (INR)    Chronic anemia    Iron deficiency anemia    Thrombocytosis    Pneumonia    Hypokalemia    Acute respiratory failure with hypercapnia (HCC)       Subjective:  · Asleep but easily aroused. The patient is using AVAPS AE night with good effect.    at    Vitals:  VITALS:  BP (!) 141/63   Pulse 82   Temp 98.5 °F (36.9 °C) (Oral)   Resp 16   Ht 5' 5\" (1.651 m)   Wt 218 lb (98.9 kg)   SpO2 99%   BMI 36.28 kg/m²     24HR INTAKE/OUTPUT:      Intake/Output Summary (Last 24 hours) at 2021 1723  Last data filed at 2021 2115  Gross per 24 hour   Intake 420 ml   Output 400 ml   Net 20 ml       24HR PULSE OXIMETRY RANGE:    SpO2  Av.7 %  Min: 98 %  Max: 99 %    Medications:  IV:   sodium chloride         Scheduled Meds:   warfarin  10 mg Oral Once per day on u    warfarin  7.5 mg Oral Once per day on Sun Tue Wed Fri Sat    methylPREDNISolone  40 mg IntraVENous Q12H    enoxaparin  1 mg/kg SubCUTAneous BID    pantoprazole  40 mg IntraVENous Daily    And    sodium chloride (PF)  10 mL IntraVENous Daily    senna  5 mL Oral Nightly    docusate sodium  100 mg Oral Daily    sodium chloride flush  5-40 mL IntraVENous 2 times per day    amLODIPine  2.5 mg Oral Daily    lisinopril  20 mg Oral BID    Arformoterol Tartrate  15 mcg Nebulization BID    influenza virus vaccine  0.5 mL IntraMUSCular Prior to discharge    ipratropium-albuterol  1 ampule Inhalation Q4H WA       Diet:   ADULT DIET; Regular; No Added Salt (3-4 gm)  ADULT ORAL NUTRITION SUPPLEMENT; Lunch, Dinner; Frozen Oral Supplement     EXAM:  General: No distress. Alert. Lying flat  Eyes: PERRL. No sclera icterus. No conjunctival injection. ENT: No discharge. Pharynx clear. Neck: Trachea midline. Normal thyroid. Resp: No accessory muscle use. No rales. No wheezing. No rhonchi. CV: Regular rate. Regular rhythm. No murmur or rub. Abd: Non-tender. Non-distended. No masses. No organomegaly. Normal bowel sounds. Skin: Warm and dry. No nodule on exposed extremities. No rash on exposed extremities. Ext: No cyanosis, clubbing, edema  Lymph: No cervical LAD. No supraclavicular LAD. M/S: No cyanosis. No joint deformity. No clubbing. Neuro: Awake. Follows commands. Positive pupils/gag/corneals. Normal pain response. Results:  CBC:   Recent Labs     10/31/21  0728 11/01/21  0605 11/02/21  0303   WBC 13.5* 15.1* 13.0*   HGB 12.7 12.5 12.5   HCT 41.0 41.0 39.7   MCV 94.9 94.9 93.6    384 364     BMP:   Recent Labs     10/31/21  0728 11/01/21  0605 11/02/21  0303    140 139   K 4.1 4.0 4.4    102 100   CO2 30* 29 29   PHOS 3.0 3.2 3.3   BUN 26* 22 21   CREATININE 0.9 0.7 0.7     LIVER PROFILE:   Recent Labs     10/31/21  0728 11/01/21  0605 11/02/21  0303   AST 12 15 11   ALT 14 20 20   BILITOT <0.2 0.3 0.2   ALKPHOS 107* 89 91     PT/INR:   Recent Labs     10/31/21  0728 11/01/21  0605 11/02/21  0356   PROTIME 21.6* 24.9* 25.1*   INR 2.0 2.3 2.3     APTT: No results for input(s): APTT in the last 72 hours. Pathology:  1. N/A      Microbiology:  1. None    Recent ABG:   No results for input(s): PH, PO2, PCO2, HCO3, BE, O2SAT, METHB, O2HB, COHB, O2CON, HHB, THB in the last 72 hours. FiO2 : 40 %  I:E Ratio: 1:2.70    Recent Films:  XR CHEST PORTABLE   Final Result   Linear pleural and/or parenchymal scarring on the right. Nothing else active. XR CHEST PORTABLE   Final Result   No acute cardiopulmonary disease. XR CHEST PORTABLE   Final Result   No acute process. XR CHEST PORTABLE   Final Result   No acute process. XR CHEST PORTABLE   Final Result   Small left-sided pleural effusion with adjacent left basilar airspace   disease, similar in appearance. Mild asymmetric right-sided airspace disease. Findings may be related to   asymmetric edema or pneumonia. Aeration in the right base is improved   compared to prior. XR CHEST PORTABLE   Final Result   Suboptimal inspiration and suspected atelectasis at the left base without   significant interval change. XR CHEST PORTABLE   Final Result   Suboptimal inspiration with suspected developing atelectasis on the left. XR CHEST PORTABLE   Final Result   1. Endotracheal tube terminates in the distal thoracic trachea. Atherosclerotic disease. Slight interval increase in interstitial markings. Possibly reflects underlying edema or potentially infection. Please   correlate with clinical presentation. 2.  Satisfactory position of the enteric tube. XR ABDOMEN FOR NG/OG/NE TUBE PLACEMENT   Final Result   1. Endotracheal tube terminates in the distal thoracic trachea. Atherosclerotic disease. Slight interval increase in interstitial markings. Possibly reflects underlying edema or potentially infection. Please   correlate with clinical presentation. 2.  Satisfactory position of the enteric tube. XR CHEST (2 VW)   Final Result   1. Left lung base infiltrate             Assessment:  1. Acute hypercapnic respiratory failure: Transferred from the ICU post extubation on 10/27. Using nocturnal ventilation and as needed ventilation. Lungs today are clear  2. History of DVT/PE: On Lovenox transitioning to Coumadin. 3. Pneumonia: Improved. 4. Possible superimposed CHF    Plan:  1.  Continue nocturnal ventilation, this needs to be used during the day as well with any naps. 2. Continue anticoagulation  3. Wean steroids as tolerated  4. The patient will need a home ventilator to prevent readmission and premature death. PapaerWork is completed. Time at the bedside, reviewing labs and radiographs, reviewing updated notes and consultations, discussing with staff and family was more than 35 minutes. Please note that voice recognition technology was used in the preparation of this note and make therefore it may contain inadvertent transcription errors. If the patient is a COVID 19 isolation patient, the above physical exam reflects that of the examining physician for the day. Court Joseph MD,  M.D., F.C.C.P.     [facility], [address] [place] [address], [address] [place] [address]

## 2021-11-02 NOTE — PLAN OF CARE
Problem: Falls - Risk of:  Goal: Will remain free from falls  Description: Will remain free from falls  Outcome: Met This Shift  Goal: Absence of physical injury  Description: Absence of physical injury  Outcome: Met This Shift     Problem: Skin Integrity:  Goal: Will show no infection signs and symptoms  Description: Will show no infection signs and symptoms  Outcome: Met This Shift  Goal: Absence of new skin breakdown  Description: Absence of new skin breakdown  Outcome: Met This Shift     Problem: OXYGENATION/RESPIRATORY FUNCTION  Goal: Patient will achieve/maintain normal respiratory rate/effort  Description: Respiratory rate and effort will be within normal limits for the patient  Outcome: Met This Shift

## 2021-11-02 NOTE — PROGRESS NOTES
P Quality Flow/Interdisciplinary Rounds Progress Note        Quality Flow Rounds held on November 2, 2021    Disciplines Attending:  Bedside Nurse, ,  and Nursing Unit Leadership    Angeline Rodas was admitted on 10/25/2021 11:18 AM    Anticipated Discharge Date:  Expected Discharge Date: 11/02/21    Disposition:    Juan R Score:  Juan R Scale Score: 19    Readmission Risk              Risk of Unplanned Readmission:  26           Discussed patient goal for the day, patient clinical progression, and barriers to discharge.   The following Goal(s) of the Day/Commitment(s) have been identified:  Discharge - Obtain Order      Deborah V[place] [id number], RN  November 2, 2021

## 2021-11-03 LAB
ALBUMIN SERPL-MCNC: 3.4 G/DL (ref 3.5–5.2)
ALP BLD-CCNC: 91 U/L (ref 35–104)
ALT SERPL-CCNC: 17 U/L (ref 0–32)
ANION GAP SERPL CALCULATED.3IONS-SCNC: 10 MMOL/L (ref 7–16)
AST SERPL-CCNC: 9 U/L (ref 0–31)
B.E.: 5.4 MMOL/L (ref -3–0)
BASOPHILS ABSOLUTE: 0.04 E9/L (ref 0–0.2)
BASOPHILS RELATIVE PERCENT: 0.3 % (ref 0–2)
BILIRUB SERPL-MCNC: 0.2 MG/DL (ref 0–1.2)
BUN BLDV-MCNC: 27 MG/DL (ref 6–23)
CALCIUM SERPL-MCNC: 9.1 MG/DL (ref 8.6–10.2)
CHLORIDE BLD-SCNC: 99 MMOL/L (ref 98–107)
CO2: 33 MMOL/L (ref 22–29)
CREAT SERPL-MCNC: 0.9 MG/DL (ref 0.5–1)
DEVICE: ABNORMAL
EOSINOPHILS ABSOLUTE: 0 E9/L (ref 0.05–0.5)
EOSINOPHILS RELATIVE PERCENT: 0 % (ref 0–6)
FIO2 ARTERIAL: 3
GFR AFRICAN AMERICAN: >60
GFR NON-AFRICAN AMERICAN: >60 ML/MIN/1.73
GLUCOSE BLD-MCNC: 108 MG/DL (ref 74–99)
HCO3 ARTERIAL: 30.1 MMOL/L (ref 22–26)
HCT VFR BLD CALC: 42.4 % (ref 34–48)
HEMOGLOBIN: 13 G/DL (ref 11.5–15.5)
IMMATURE GRANULOCYTES #: 0.37 E9/L
IMMATURE GRANULOCYTES %: 2.4 % (ref 0–5)
INR BLD: 2.4
LYMPHOCYTES ABSOLUTE: 1.95 E9/L (ref 1.5–4)
LYMPHOCYTES RELATIVE PERCENT: 12.4 % (ref 20–42)
MAGNESIUM: 1.9 MG/DL (ref 1.6–2.6)
MCH RBC QN AUTO: 29 PG (ref 26–35)
MCHC RBC AUTO-ENTMCNC: 30.7 % (ref 32–34.5)
MCV RBC AUTO: 94.6 FL (ref 80–99.9)
MONOCYTES ABSOLUTE: 1.08 E9/L (ref 0.1–0.95)
MONOCYTES RELATIVE PERCENT: 6.9 % (ref 2–12)
NEUTROPHILS ABSOLUTE: 12.26 E9/L (ref 1.8–7.3)
NEUTROPHILS RELATIVE PERCENT: 78 % (ref 43–80)
O2 SATURATION: 96.7 % (ref 92–98.5)
OPERATOR ID: 1394
PCO2 ARTERIAL: 43.3 MMHG (ref 35–45)
PDW BLD-RTO: 14.4 FL (ref 11.5–15)
PH BLOOD GAS: 7.45 (ref 7.35–7.45)
PHOSPHORUS: 3.5 MG/DL (ref 2.5–4.5)
PLATELET # BLD: 431 E9/L (ref 130–450)
PMV BLD AUTO: 10.7 FL (ref 7–12)
PO2 ARTERIAL: 84.5 MMHG (ref 60–80)
POTASSIUM SERPL-SCNC: 4.1 MMOL/L (ref 3.5–5)
PROTHROMBIN TIME: 26.4 SEC (ref 9.3–12.4)
RBC # BLD: 4.48 E12/L (ref 3.5–5.5)
SODIUM BLD-SCNC: 142 MMOL/L (ref 132–146)
SOURCE, BLOOD GAS: ABNORMAL
TOTAL PROTEIN: 6.4 G/DL (ref 6.4–8.3)
WBC # BLD: 15.7 E9/L (ref 4.5–11.5)

## 2021-11-03 PROCEDURE — 1200000000 HC SEMI PRIVATE

## 2021-11-03 PROCEDURE — 36415 COLL VENOUS BLD VENIPUNCTURE: CPT

## 2021-11-03 PROCEDURE — 6360000002 HC RX W HCPCS: Performed by: STUDENT IN AN ORGANIZED HEALTH CARE EDUCATION/TRAINING PROGRAM

## 2021-11-03 PROCEDURE — 94660 CPAP INITIATION&MGMT: CPT

## 2021-11-03 PROCEDURE — 85025 COMPLETE CBC W/AUTO DIFF WBC: CPT

## 2021-11-03 PROCEDURE — 6360000002 HC RX W HCPCS: Performed by: PHYSICIAN ASSISTANT

## 2021-11-03 PROCEDURE — 83735 ASSAY OF MAGNESIUM: CPT

## 2021-11-03 PROCEDURE — 6370000000 HC RX 637 (ALT 250 FOR IP): Performed by: INTERNAL MEDICINE

## 2021-11-03 PROCEDURE — 82803 BLOOD GASES ANY COMBINATION: CPT

## 2021-11-03 PROCEDURE — 84100 ASSAY OF PHOSPHORUS: CPT

## 2021-11-03 PROCEDURE — 2580000003 HC RX 258: Performed by: INTERNAL MEDICINE

## 2021-11-03 PROCEDURE — 6370000000 HC RX 637 (ALT 250 FOR IP): Performed by: PHYSICIAN ASSISTANT

## 2021-11-03 PROCEDURE — 85610 PROTHROMBIN TIME: CPT

## 2021-11-03 PROCEDURE — 94640 AIRWAY INHALATION TREATMENT: CPT

## 2021-11-03 PROCEDURE — 2580000003 HC RX 258: Performed by: EMERGENCY MEDICINE

## 2021-11-03 PROCEDURE — 6360000002 HC RX W HCPCS: Performed by: INTERNAL MEDICINE

## 2021-11-03 PROCEDURE — 80053 COMPREHEN METABOLIC PANEL: CPT

## 2021-11-03 PROCEDURE — C9113 INJ PANTOPRAZOLE SODIUM, VIA: HCPCS | Performed by: INTERNAL MEDICINE

## 2021-11-03 PROCEDURE — 2700000000 HC OXYGEN THERAPY PER DAY

## 2021-11-03 RX ADMIN — IPRATROPIUM BROMIDE AND ALBUTEROL SULFATE 1 AMPULE: .5; 2.5 SOLUTION RESPIRATORY (INHALATION) at 16:01

## 2021-11-03 RX ADMIN — IPRATROPIUM BROMIDE AND ALBUTEROL SULFATE 1 AMPULE: .5; 2.5 SOLUTION RESPIRATORY (INHALATION) at 12:18

## 2021-11-03 RX ADMIN — AMLODIPINE BESYLATE 2.5 MG: 2.5 TABLET ORAL at 10:04

## 2021-11-03 RX ADMIN — SODIUM CHLORIDE, PRESERVATIVE FREE 10 ML: 5 INJECTION INTRAVENOUS at 10:03

## 2021-11-03 RX ADMIN — WARFARIN SODIUM 7.5 MG: 7.5 TABLET ORAL at 17:58

## 2021-11-03 RX ADMIN — SODIUM CHLORIDE, PRESERVATIVE FREE 10 ML: 5 INJECTION INTRAVENOUS at 10:06

## 2021-11-03 RX ADMIN — METHYLPREDNISOLONE SODIUM SUCCINATE 40 MG: 40 INJECTION, POWDER, LYOPHILIZED, FOR SOLUTION INTRAMUSCULAR; INTRAVENOUS at 10:05

## 2021-11-03 RX ADMIN — LISINOPRIL 20 MG: 20 TABLET ORAL at 10:04

## 2021-11-03 RX ADMIN — IPRATROPIUM BROMIDE AND ALBUTEROL SULFATE 1 AMPULE: .5; 2.5 SOLUTION RESPIRATORY (INHALATION) at 19:16

## 2021-11-03 RX ADMIN — ARFORMOTEROL TARTRATE 15 MCG: 15 SOLUTION RESPIRATORY (INHALATION) at 19:16

## 2021-11-03 RX ADMIN — LISINOPRIL 20 MG: 20 TABLET ORAL at 17:58

## 2021-11-03 RX ADMIN — SODIUM CHLORIDE, PRESERVATIVE FREE 10 ML: 5 INJECTION INTRAVENOUS at 21:17

## 2021-11-03 RX ADMIN — PANTOPRAZOLE SODIUM 40 MG: 40 INJECTION, POWDER, FOR SOLUTION INTRAVENOUS at 10:04

## 2021-11-03 NOTE — PROGRESS NOTES
[facility] Quality Flow/Interdisciplinary Rounds Progress Note        Quality Flow Rounds held on November 3, 2021    Disciplines Attending:  Bedside Nurse, ,  and Nursing Unit Leadership    Gianni Stokes was admitted on 10/25/2021 11:18 AM    Anticipated Discharge Date:  Expected Discharge Date: 11/02/21    Disposition:    Juan R Score:  Juan R Scale Score: 19    Readmission Risk              Risk of Unplanned Readmission:  26           Discussed patient goal for the day, patient clinical progression, and barriers to discharge.   The following Goal(s) of the Day/Commitment(s) have been identified:  Diagnostics - Report Results and Labs - Report Results      Justin Zimmerman RN  November 3, 2021

## 2021-11-03 NOTE — PROGRESS NOTES
Occupational Therapy  Patient treatment attempted this PM.  Patient laying in the bed. States she \"is being lazy\". Refused therapy. States she is just waiting for medical equipment so that she can go home. Will attempt at a later time.   Jewel FRANCO/HENRIETTA [id number]

## 2021-11-03 NOTE — PROGRESS NOTES
Subjective: The patient is awake and alert. Lying in bed without complaints  No acute events overnight. Denies chest pain, angina, SOB    Patient back to baseline of O2 requirements      Objective:    BP (!) 153/95   Pulse 90   Temp 97.2 °F (36.2 °C) (Oral)   Resp 20   Ht 5' 5\" (1.651 m)   Wt 218 lb (98.9 kg)   SpO2 98%   BMI 36.28 kg/m²     In: 180 [P.O.:180]  Out: 1600   In: 180   Out: 1600 [Urine:1600]    General appearance: NAD, conversant  HEENT: AT/NC, MMM  Neck: FROM, supple  Lungs: Diminshed   2 liters O2  CV: RRR, no MRGs  Vasc: Radial pulses 2+  Abdomen: Soft, non-tender; no masses or HSM  Extremities: No peripheral edema or digital cyanosis  Skin: no rash, lesions or ulcers  Psych: Alert and oriented to person, place and time  Neuro: Alert and interactive     Recent Labs     11/01/21  0605 11/02/21  0303 11/03/21  0848   WBC 15.1* 13.0* 15.7*   HGB 12.5 12.5 13.0   HCT 41.0 39.7 42.4    364 431       Recent Labs     11/01/21  0605 11/02/21  0303 11/03/21  0848    139 142   K 4.0 4.4 4.1    100 99   CO2 29 29 33*   BUN 22 21 27*   CREATININE 0.7 0.7 0.9   CALCIUM 8.6 8.6 9.1       Assessment:    Principal Problem:    Pneumonia  Active Problems:    HTN (hypertension)    Hypokalemia    Acute respiratory failure with hypercapnia (HCC)  Resolved Problems:    * No resolved hospital problems. *      Plan:    Admitted for acute on chronic hypoxemic respiratory failure from pna   intubation on admission   Extubated 10/27 -   continue abx therapy - completed during hospital stay  pulm following - appreciate input  iss for dm   Continue coumadin goal inr 2-3 - today 2.4  Resume home meds   Weaning steroids - completed    DVT Prophylaxis   PT/OT  Discharge planning - patient will discharge home once AVAPS has been set up at home. ?   She is ready for dc home - Review is still in process    YOSEPH Mahoney CNP  2:48 PM  11/3/2021     Above note edited to reflect my thoughts Pleasant, no acute complaints  Breathing is markedly improved  Off oxygen on my evaluation and doing great  Leukocytosis from recent steroids    I personally saw, examined and provided care for the patient. Radiographs, labs and medication list were reviewed by me independently. The case was discussed in detail and plans for care were established. Review of [address] [place] [address], documentation was conducted and revisions were made as appropriate directly by me. I agree with the above documented exam, problem list, and plan of care.      Rohit Bhatt MD  6:46 PM  11/3/2021

## 2021-11-03 NOTE — CARE COORDINATION
Continue to await responses from both [facility] for [address] arrangements and [facility] for home NIV/AVAVPS    Calls to both agencies today. They have all necessary information but requests remain in review process. Will follow along with  and assist with discharge planning as necessary. Timmy Pierre.  Rik, MSN, RN  [facility] Case Management  [phone]

## 2021-11-03 NOTE — PROGRESS NOTES
Pulmonary Progress Note    Admit Date: 10/25/2021  Hospital day                               PCP: Eugene Queen MD    Chief Complaint (s):  Patient Active Problem List   Diagnosis    Pneumonia due to organism    Chronic respiratory failure with hypoxia (HCC)    COPD exacerbation ([address]    Supratherapeutic INR    FAISAL (obstructive sleep apnea)    Leukocytosis    Dyspnea and respiratory abnormalities    HTN (hypertension)    Tobacco abuse    History of pulmonary embolism    History of DVT (deep vein thrombosis)    Subtherapeutic international normalized ratio (INR)    Chronic anemia    Iron deficiency anemia    Thrombocytosis    Pneumonia    Hypokalemia    Acute respiratory failure with hypercapnia (HCC)       Subjective:  · Resting comfortably but arouses easily.    at    Vitals:  VITALS:  BP (!) 153/95   Pulse 90   Temp 97.2 °F (36.2 °C) (Oral)   Resp 20   Ht 5' 5\" (1.651 m)   Wt 218 lb (98.9 kg)   SpO2 98%   BMI 36.28 kg/m²     24HR INTAKE/OUTPUT:      Intake/Output Summary (Last 24 hours) at 11/3/2021 1422  Last data filed at 11/3/2021 1230  Gross per 24 hour   Intake 180 ml   Output 1600 ml   Net -1420 ml       24HR PULSE OXIMETRY RANGE:    SpO2  Av.8 %  Min: 96 %  Max: 99 %    Medications:  IV:   sodium chloride         Scheduled Meds:   warfarin  10 mg Oral Once per day on     warfarin  7.5 mg Oral Once per day on Sun Tue Wed Fri Sat    enoxaparin  1 mg/kg SubCUTAneous BID    pantoprazole  40 mg IntraVENous Daily    And    sodium chloride (PF)  10 mL IntraVENous Daily    senna  5 mL Oral Nightly    docusate sodium  100 mg Oral Daily    sodium chloride flush  5-40 mL IntraVENous 2 times per day    amLODIPine  2.5 mg Oral Daily    lisinopril  20 mg Oral BID    Arformoterol Tartrate  15 mcg Nebulization BID    influenza virus vaccine  0.5 mL IntraMUSCular Prior to discharge    ipratropium-albuterol  1 ampule Inhalation Q4H WA       Diet:   ADULT DIET; Regular; No Added Salt (3-4 gm)  ADULT ORAL NUTRITION SUPPLEMENT; Lunch, Dinner; Frozen Oral Supplement     EXAM:  General: No distress. Alert. Lying flat  Eyes: PERRL. No sclera icterus. No conjunctival injection. ENT: No discharge. Pharynx clear. Neck: Trachea midline. Normal thyroid. Resp: No accessory muscle use. No rales. No wheezing. No rhonchi. CV: Regular rate. Regular rhythm. No murmur or rub. Abd: Non-tender. Non-distended. No masses. No organomegaly. Normal bowel sounds. Skin: Warm and dry. No nodule on exposed extremities. No rash on exposed extremities. Ext: No cyanosis, clubbing, edema  Lymph: No cervical LAD. No supraclavicular LAD. M/S: No cyanosis. No joint deformity. No clubbing. Neuro: Awake. Follows commands. Positive pupils/gag/corneals. Normal pain response. Results:  CBC:   Recent Labs     11/01/21  0605 11/02/21  0303 11/03/21  0848   WBC 15.1* 13.0* 15.7*   HGB 12.5 12.5 13.0   HCT 41.0 39.7 42.4   MCV 94.9 93.6 94.6    364 431     BMP:   Recent Labs     11/01/21  0605 11/02/21  0303 11/03/21  0848    139 142   K 4.0 4.4 4.1    100 99   CO2 29 29 33*   PHOS 3.2 3.3 3.5   BUN 22 21 27*   CREATININE 0.7 0.7 0.9     LIVER PROFILE:   Recent Labs     11/01/21  0605 11/02/21  0303 11/03/21  0848   AST 15 11 9   ALT 20 20 17   BILITOT 0.3 0.2 0.2   ALKPHOS 89 91 91     PT/INR:   Recent Labs     11/01/21  0605 11/02/21  0356 11/03/21  0848   PROTIME 24.9* 25.1* 26.4*   INR 2.3 2.3 2.4     APTT: No results for input(s): APTT in the last 72 hours. Pathology:  1. N/A      Microbiology:  1. None    Recent ABG:   No results for input(s): PH, PO2, PCO2, HCO3, BE, O2SAT, METHB, O2HB, COHB, O2CON, HHB, THB in the last 72 hours. FiO2 : 40 %  I:E Ratio: 1:2.70    Recent Films:  XR CHEST PORTABLE   Final Result   Linear pleural and/or parenchymal scarring on the right. Nothing else active.          XR CHEST PORTABLE   Final Result   No acute cardiopulmonary disease. XR CHEST PORTABLE   Final Result   No acute process. XR CHEST PORTABLE   Final Result   No acute process. XR CHEST PORTABLE   Final Result   Small left-sided pleural effusion with adjacent left basilar airspace   disease, similar in appearance. Mild asymmetric right-sided airspace disease. Findings may be related to   asymmetric edema or pneumonia. Aeration in the right base is improved   compared to prior. XR CHEST PORTABLE   Final Result   Suboptimal inspiration and suspected atelectasis at the left base without   significant interval change. XR CHEST PORTABLE   Final Result   Suboptimal inspiration with suspected developing atelectasis on the left. XR CHEST PORTABLE   Final Result   1. Endotracheal tube terminates in the distal thoracic trachea. Atherosclerotic disease. Slight interval increase in interstitial markings. Possibly reflects underlying edema or potentially infection. Please   correlate with clinical presentation. 2.  Satisfactory position of the enteric tube. XR ABDOMEN FOR NG/OG/NE TUBE PLACEMENT   Final Result   1. Endotracheal tube terminates in the distal thoracic trachea. Atherosclerotic disease. Slight interval increase in interstitial markings. Possibly reflects underlying edema or potentially infection. Please   correlate with clinical presentation. 2.  Satisfactory position of the enteric tube. XR CHEST (2 VW)   Final Result   1. Left lung base infiltrate             Assessment:  1. Acute hypercapnic respiratory failure: Transferred from the ICU post extubation on 10/27. Using nocturnal ventilation and as needed ventilation. Lungs today are clear  2. History of DVT/PE: On Lovenox transitioning to Coumadin. 3. Pneumonia: Improved. 4. Possible superimposed CHF    Plan:  1.  Continue nocturnal ventilation, this needs to be used during the day as well with any naps.  2. Continue anticoagulation  3. Stop steroids  4. The patient will need a home ventilator to prevent readmission and premature death. 5. Check blood gases    Time at the bedside, reviewing labs and radiographs, reviewing updated notes and consultations, discussing with staff and family was more than 35 minutes. Please note that voice recognition technology was used in the preparation of this note and make therefore it may contain inadvertent transcription errors. If the patient is a COVID 19 isolation patient, the above physical exam reflects that of the examining physician for the day. Calixto Savage MD,  M.D., F.C.C.P.     [facility], [address] [facility], [address] [place] [address]

## 2021-11-04 VITALS
HEART RATE: 91 BPM | HEIGHT: 65 IN | DIASTOLIC BLOOD PRESSURE: 62 MMHG | WEIGHT: 218 LBS | OXYGEN SATURATION: 98 % | RESPIRATION RATE: 21 BRPM | SYSTOLIC BLOOD PRESSURE: 134 MMHG | BODY MASS INDEX: 36.32 KG/M2 | TEMPERATURE: 98.3 F

## 2021-11-04 LAB
ALBUMIN SERPL-MCNC: 3.3 G/DL (ref 3.5–5.2)
ALP BLD-CCNC: 83 U/L (ref 35–104)
ALT SERPL-CCNC: 15 U/L (ref 0–32)
ANION GAP SERPL CALCULATED.3IONS-SCNC: 9 MMOL/L (ref 7–16)
AST SERPL-CCNC: 13 U/L (ref 0–31)
BASOPHILS ABSOLUTE: 0.05 E9/L (ref 0–0.2)
BASOPHILS RELATIVE PERCENT: 0.3 % (ref 0–2)
BILIRUB SERPL-MCNC: 0.3 MG/DL (ref 0–1.2)
BUN BLDV-MCNC: 27 MG/DL (ref 6–23)
CALCIUM SERPL-MCNC: 8.8 MG/DL (ref 8.6–10.2)
CHLORIDE BLD-SCNC: 99 MMOL/L (ref 98–107)
CO2: 32 MMOL/L (ref 22–29)
CREAT SERPL-MCNC: 1 MG/DL (ref 0.5–1)
EOSINOPHILS ABSOLUTE: 0.09 E9/L (ref 0.05–0.5)
EOSINOPHILS RELATIVE PERCENT: 0.5 % (ref 0–6)
GFR AFRICAN AMERICAN: >60
GFR NON-AFRICAN AMERICAN: 53 ML/MIN/1.73
GLUCOSE BLD-MCNC: 110 MG/DL (ref 74–99)
HCT VFR BLD CALC: 42 % (ref 34–48)
HEMOGLOBIN: 12.9 G/DL (ref 11.5–15.5)
IMMATURE GRANULOCYTES #: 0.44 E9/L
IMMATURE GRANULOCYTES %: 2.4 % (ref 0–5)
INR BLD: 1.9
LYMPHOCYTES ABSOLUTE: 3.05 E9/L (ref 1.5–4)
LYMPHOCYTES RELATIVE PERCENT: 16.8 % (ref 20–42)
MAGNESIUM: 1.9 MG/DL (ref 1.6–2.6)
MCH RBC QN AUTO: 29.1 PG (ref 26–35)
MCHC RBC AUTO-ENTMCNC: 30.7 % (ref 32–34.5)
MCV RBC AUTO: 94.8 FL (ref 80–99.9)
MONOCYTES ABSOLUTE: 1.29 E9/L (ref 0.1–0.95)
MONOCYTES RELATIVE PERCENT: 7.1 % (ref 2–12)
NEUTROPHILS ABSOLUTE: 13.2 E9/L (ref 1.8–7.3)
NEUTROPHILS RELATIVE PERCENT: 72.9 % (ref 43–80)
PDW BLD-RTO: 14.6 FL (ref 11.5–15)
PHOSPHORUS: 3.9 MG/DL (ref 2.5–4.5)
PLATELET # BLD: 390 E9/L (ref 130–450)
PMV BLD AUTO: 10.3 FL (ref 7–12)
POTASSIUM SERPL-SCNC: 4 MMOL/L (ref 3.5–5)
PROTHROMBIN TIME: 21.4 SEC (ref 9.3–12.4)
RBC # BLD: 4.43 E12/L (ref 3.5–5.5)
SODIUM BLD-SCNC: 140 MMOL/L (ref 132–146)
TOTAL PROTEIN: 6.1 G/DL (ref 6.4–8.3)
WBC # BLD: 18.1 E9/L (ref 4.5–11.5)

## 2021-11-04 PROCEDURE — 83735 ASSAY OF MAGNESIUM: CPT

## 2021-11-04 PROCEDURE — 6370000000 HC RX 637 (ALT 250 FOR IP): Performed by: STUDENT IN AN ORGANIZED HEALTH CARE EDUCATION/TRAINING PROGRAM

## 2021-11-04 PROCEDURE — G0008 ADMIN INFLUENZA VIRUS VAC: HCPCS | Performed by: INTERNAL MEDICINE

## 2021-11-04 PROCEDURE — 2580000003 HC RX 258: Performed by: INTERNAL MEDICINE

## 2021-11-04 PROCEDURE — 90694 VACC AIIV4 NO PRSRV 0.5ML IM: CPT | Performed by: INTERNAL MEDICINE

## 2021-11-04 PROCEDURE — 6360000002 HC RX W HCPCS: Performed by: INTERNAL MEDICINE

## 2021-11-04 PROCEDURE — 84100 ASSAY OF PHOSPHORUS: CPT

## 2021-11-04 PROCEDURE — 6360000002 HC RX W HCPCS: Performed by: PHYSICIAN ASSISTANT

## 2021-11-04 PROCEDURE — C9113 INJ PANTOPRAZOLE SODIUM, VIA: HCPCS | Performed by: INTERNAL MEDICINE

## 2021-11-04 PROCEDURE — 97530 THERAPEUTIC ACTIVITIES: CPT

## 2021-11-04 PROCEDURE — 6370000000 HC RX 637 (ALT 250 FOR IP): Performed by: PHYSICIAN ASSISTANT

## 2021-11-04 PROCEDURE — 6370000000 HC RX 637 (ALT 250 FOR IP): Performed by: INTERNAL MEDICINE

## 2021-11-04 PROCEDURE — 94640 AIRWAY INHALATION TREATMENT: CPT

## 2021-11-04 PROCEDURE — 94660 CPAP INITIATION&MGMT: CPT

## 2021-11-04 PROCEDURE — 85025 COMPLETE CBC W/AUTO DIFF WBC: CPT

## 2021-11-04 PROCEDURE — 2700000000 HC OXYGEN THERAPY PER DAY

## 2021-11-04 PROCEDURE — 36415 COLL VENOUS BLD VENIPUNCTURE: CPT

## 2021-11-04 PROCEDURE — 85610 PROTHROMBIN TIME: CPT

## 2021-11-04 PROCEDURE — 2580000003 HC RX 258: Performed by: EMERGENCY MEDICINE

## 2021-11-04 PROCEDURE — 80053 COMPREHEN METABOLIC PANEL: CPT

## 2021-11-04 RX ADMIN — WARFARIN SODIUM 10 MG: 10 TABLET ORAL at 17:07

## 2021-11-04 RX ADMIN — IPRATROPIUM BROMIDE AND ALBUTEROL SULFATE 1 AMPULE: .5; 2.5 SOLUTION RESPIRATORY (INHALATION) at 13:00

## 2021-11-04 RX ADMIN — LISINOPRIL 20 MG: 20 TABLET ORAL at 09:29

## 2021-11-04 RX ADMIN — ARFORMOTEROL TARTRATE 15 MCG: 15 SOLUTION RESPIRATORY (INHALATION) at 07:47

## 2021-11-04 RX ADMIN — IPRATROPIUM BROMIDE AND ALBUTEROL SULFATE 1 AMPULE: .5; 2.5 SOLUTION RESPIRATORY (INHALATION) at 07:48

## 2021-11-04 RX ADMIN — PANTOPRAZOLE SODIUM 40 MG: 40 INJECTION, POWDER, FOR SOLUTION INTRAVENOUS at 09:28

## 2021-11-04 RX ADMIN — DOCUSATE SODIUM 100 MG: 50 LIQUID ORAL at 09:28

## 2021-11-04 RX ADMIN — AMLODIPINE BESYLATE 2.5 MG: 2.5 TABLET ORAL at 09:28

## 2021-11-04 RX ADMIN — LISINOPRIL 20 MG: 20 TABLET ORAL at 17:07

## 2021-11-04 RX ADMIN — SODIUM CHLORIDE, PRESERVATIVE FREE 10 ML: 5 INJECTION INTRAVENOUS at 09:28

## 2021-11-04 RX ADMIN — INFLUENZA VACCINE, ADJUVANTED 0.5 ML: 15; 15; 15; 15 INJECTION, SUSPENSION INTRAMUSCULAR at 17:06

## 2021-11-04 RX ADMIN — SODIUM CHLORIDE, PRESERVATIVE FREE 10 ML: 5 INJECTION INTRAVENOUS at 09:29

## 2021-11-04 ASSESSMENT — PAIN SCALES - GENERAL
PAINLEVEL_OUTOF10: 0
PAINLEVEL_OUTOF10: 0

## 2021-11-04 NOTE — PROGRESS NOTES
Date: 11/3/2021    Time: 10:31 PM    Patient Placed On BIPAP/CPAP/ Non-Invasive Ventilation? Yes    If no must comment. Facial area red/color change? No           If YES are Blister/Lesion present? No   If yes must notify nursing staff  BIPAP/CPAP skin barrier?   Yes    Skin barrier type:mepilexlite       Comments:        Feliciano Devries RCP

## 2021-11-04 NOTE — DISCHARGE SUMMARY
Physician Discharge Summary     Patient ID:  Geraldo Stephenson  [id number]  80 y.o.  1940    Admit date: 10/25/2021    Discharge date and time: 11/4/2021    Admission Diagnoses:   Patient Active Problem List   Diagnosis    Pneumonia due to organism    Chronic respiratory failure with hypoxia ([address]    COPD exacerbation (HCC)    Supratherapeutic INR    FAISAL (obstructive sleep apnea)    Leukocytosis    Dyspnea and respiratory abnormalities    HTN (hypertension)    Tobacco abuse    History of pulmonary embolism    History of DVT (deep vein thrombosis)    Subtherapeutic international normalized ratio (INR)    Chronic anemia    Iron deficiency anemia    Thrombocytosis    Pneumonia    Hypokalemia    Acute respiratory failure with hypercapnia (HCC)       Discharge Diagnoses: Pneumonia    Consults: pulmonary/intensive care    Procedures: none    Hospital Course:   Admitted for acute on chronic hypoxemic respiratory failure from pna   intubation on admission   Extubated 10/27 - home with AVAPS  continue abx therapy - completed during hospital stay>> discontinued  Pulm following -signed off, follow up outpatient   iss for dm   Continue coumadin goal inr 2-3 - today 2.4   Resume home meds    Weaning steroids - completed       Recent Labs     11/02/21  0303 11/03/21  0848 11/04/21  1038   WBC 13.0* 15.7* 18.1*   HGB 12.5 13.0 12.9   HCT 39.7 42.4 42.0    431 390       Recent Labs     11/02/21  0303 11/03/21  0848 11/04/21  1038    142 140   K 4.4 4.1 4.0    99 99   CO2 29 33* 32*   BUN 21 27* 27*   CREATININE 0.7 0.9 1.0   CALCIUM 8.6 9.1 8.8       XR CHEST (2 VW)    Result Date: 10/25/2021  EXAMINATION: TWO XRAY VIEWS OF THE CHEST 10/25/2021 10:25 am COMPARISON: 10/22/2021 HISTORY: ORDERING SYSTEM PROVIDED HISTORY: sob TECHNOLOGIST PROVIDED HISTORY: Reason for exam:->sob FINDINGS: The cardiac silhouette is within normal limits. The right lung is clear.  There is an infiltrate seen within the left lung base. This is seen on both the PA and lateral views. The left upper lobe is clear. 1. Left lung base infiltrate     XR CHEST PORTABLE    Result Date: 10/26/2021  EXAMINATION: ONE XRAY VIEW OF THE CHEST 10/26/2021 6:34 am COMPARISON: 25 October 2021 HISTORY: ORDERING SYSTEM PROVIDED HISTORY: resp distress on vent TECHNOLOGIST PROVIDED HISTORY: Daily while on vent Reason for exam:->resp distress on vent FINDINGS: Suboptimal inspiration. There is opacity at the left base which may relate to atelectasis. Stable support lines. Suboptimal inspiration with suspected developing atelectasis on the left. XR CHEST PORTABLE    Result Date: 10/25/2021  EXAMINATION: ONE SUPINE XRAY VIEW(S) OF THE ABDOMEN; ONE XRAY VIEW OF THE CHEST 10/25/2021 11:07 pm COMPARISON: Chest series from the same day at 10:30 HISTORY: ORDERING SYSTEM PROVIDED HISTORY: Confirmation of course of NG/OG/NE tube and location of tip of tube TECHNOLOGIST PROVIDED HISTORY: Reason for exam:->Confirmation of course of NG/OG/NE tube and location of tip of tube Portable? ->Yes FINDINGS: CHEST: Endotracheal tube terminates in the mid to distal thoracic trachea. Coarse interstitial markings in the lungs. Slight interval increase in interstitial opacities bilaterally. No pleural effusion or pneumothorax. Atherosclerotic disease in the thoracic aorta. Cardiac silhouette appears within normal limits in size. Osseous mineralization is decreased. UPPER ABDOMEN: Enteric tube with distal tip and side port extending subdiaphragmatic. Distal tip projects over the left upper quadrant. Imaged bowel gas pattern is unremarkable. No pneumatosis or portal venous gas. 1.  Endotracheal tube terminates in the distal thoracic trachea. Atherosclerotic disease. Slight interval increase in interstitial markings. Possibly reflects underlying edema or potentially infection. Please correlate with clinical presentation.  2.  Satisfactory position of the enteric tube. XR ABDOMEN FOR NG/OG/NE TUBE PLACEMENT    Result Date: 10/25/2021  EXAMINATION: ONE SUPINE XRAY VIEW(S) OF THE ABDOMEN; ONE XRAY VIEW OF THE CHEST 10/25/2021 11:07 pm COMPARISON: Chest series from the same day at 10:30 HISTORY: ORDERING SYSTEM PROVIDED HISTORY: Confirmation of course of NG/OG/NE tube and location of tip of tube TECHNOLOGIST PROVIDED HISTORY: Reason for exam:->Confirmation of course of NG/OG/NE tube and location of tip of tube Portable? ->Yes FINDINGS: CHEST: Endotracheal tube terminates in the mid to distal thoracic trachea. Coarse interstitial markings in the lungs. Slight interval increase in interstitial opacities bilaterally. No pleural effusion or pneumothorax. Atherosclerotic disease in the thoracic aorta. Cardiac silhouette appears within normal limits in size. Osseous mineralization is decreased. UPPER ABDOMEN: Enteric tube with distal tip and side port extending subdiaphragmatic. Distal tip projects over the left upper quadrant. Imaged bowel gas pattern is unremarkable. No pneumatosis or portal venous gas. 1.  Endotracheal tube terminates in the distal thoracic trachea. Atherosclerotic disease. Slight interval increase in interstitial markings. Possibly reflects underlying edema or potentially infection. Please correlate with clinical presentation. 2.  Satisfactory position of the enteric tube. Discharge Exam:    HEENT: NCAT,  PERRLA, No JVD  Heart:  RRR, no murmurs, gallops, or rubs.   Lungs:  Diminished  Abd: bowel sounds present, nontender, nondistended, no masses  Extrem:  No clubbing, cyanosis, or edema    Disposition: home     Patient Condition at Discharge: Stable    Patient Instructions:      Medication List      START taking these medications    Magic Mouthwash  Commonly known as: Miracle Mouthwash  Swish and spit 5 mLs 4 times daily as needed for Irritation        CHANGE how you take these medications    predniSONE 20 MG tablet  Commonly known as: DELTASONE  Take 1 tablet by mouth daily for 5 days  What changed: when to take this        CONTINUE taking these medications    amLODIPine 2.5 MG tablet  Commonly known as: NORVASC  Take 1 tablet by mouth daily     Anoro Ellipta 62.5-25 MCG/INH Aepb inhaler  Generic drug: umeclidinium-vilanterol     ferrous sulfate 325 (65 Fe) MG tablet  Commonly known as: IRON 325  Take 1 tablet by mouth daily (with breakfast)     Full Kit Nebulizer Set Misc  Use as directed with nebulized medication. guaiFENesin 400 MG tablet  Take 1 tablet by mouth 4 times daily as needed for Cough     ipratropium-albuterol 0.5-2.5 (3) MG/3ML Soln nebulizer solution  Commonly known as: DUONEB  Inhale 3 mLs into the lungs every 6 hours as needed for Shortness of Breath     lisinopril 20 MG tablet  Commonly known as: PRINIVIL;ZESTRIL     pantoprazole 40 MG tablet  Commonly known as: PROTONIX     * warfarin 7.5 MG tablet  Commonly known as: COUMADIN     * warfarin 10 MG tablet  Commonly known as: COUMADIN  Take 1 tablet by mouth Twice a Week Wed and Saturday @ 2100. * This list has 2 medication(s) that are the same as other medications prescribed for you. Read the directions carefully, and ask your doctor or other care provider to review them with you. Where to Get Your Medications      These medications were sent to [facility] [phone]  [address] [place] [id number]    Phone: [phone]   · Magic Mouthwash   · predniSONE 20 MG tablet       Activity: activity as tolerated  Diet: regular diet    Pt has been advised to: Follow-up with Charis Tsai MD in 1 week.   Follow-up with consultants as recommended by them    Note that over 30 minutes was spent in preparing discharge papers, discussing discharge with patient, medication review, etc.    Signed:  YOSEPH Fernandez CNP  11/4/2021  3:53 PM     Above note edited to reflect my thoughts     I personally saw, examined and provided care for the patient. Radiographs, labs and medication list were reviewed by me independently. The case was discussed in detail and plans for care were established. Review of LUCIO Galdamez   , documentation was conducted and revisions were made as appropriate directly by me. I agree with the above documented exam, problem list, and plan of care.      Ramiro Dunne MD  11/4/2021

## 2021-11-04 NOTE — PROGRESS NOTES
Physical Therapy    Facility/Department: [facility] MED SURG  Treatment note    NAME: Mayda Chavez  : 1940  MRN: [id number]    Date of Service: 2021      Attending Provider:  Marigene Kehr, MD    Evaluating PT:  Júnior Travis. Paulino Wallace, P.T. Room #:  0510/0510-A  Diagnosis:  Pneumonia [J18.9]  Precautions:  Falls    SUBJECTIVE:    Pt lives with her daughter in a 1 story home with 2 stairs and 1 rail to enter. Pt ambulated with a ww PTA. Pt uses 2L home O2. OBJECTIVE:   Initial Evaluation  Date: 10/29/21 Treatment  21 Short Term/ Long Term   Goals   Was pt agreeable to Eval/treatment? yes yes    Does pt have pain? No c/o pain No complaints    Bed Mobility  Rolling: SBA  Supine to sit: MOD A  Sit to supine: NA  Scooting: MIN A Rolling: SBA  Supine to sit: SBA  Scooting: sba  seated to EOB Independent    Transfers Sit to stand: MIN A  Stand to sit: MIN A  Stand pivot: MIN A with ww Sit <> stand Min A  Stand pivot min assist with ww cues to square up to the chair  Independent    Ambulation   15 feet x2 with ww MIN A 40  feet x 1 using [employer] for support Min A for balance 75 feet with ww supervision   Stair negotiation: ascended and descended NA  2 steps with 1 rail SBA   AM-PAC 6 Clicks         Pt is alert and O x  2  -    Balance: poor dynamic using WW for support    Pt performed therapeutic exercise of the following: function     Patient education  Pt was educated on walker approximation     Patient response to education:   Pt verbalized understanding Pt demonstrated skill Pt requires further education in this area   yes With instruction yes     ASSESSMENT:   Comments: Nurse ok with Rx. Pt found in bed, agreed to rx after encouragement. Pt assisted to EOB, sat EOB SBA. Gait performed in the room, mary slow and inconsistent and poor walker approximation. Pt unsteady throughout, required constant hands on assist for balance and safety.   Pt became slightly agitated with verbal cues during rx.   Pt up in chair and set up for lunch. Treatment: Pt practiced and was instructed in the following treatment: therapeutic exercise, transfer safety, gait mechanis    Pt was left in a bedside chair with call light in reach, waffle cushion in place. Chair/bed alarm: chair alarm active    Time in 1128  Time out 1143  Total Treatment Time 14  minutes   CPT codes:     Therapeutic activities 35282 14 minutes         Pt is making good progress toward established Physical Therapy goals. Continue with physical therapy current plan of care.     Adriana Rojas, [address]

## 2021-11-04 NOTE — CARE COORDINATION
Notified by Francy Kendrick with [facility] that NIV has been approved for for home. Agency is working on delivery and setup. Notified by [facility] that [facility] referral was sent to [facility]. Call placed to Ijeoma with [facility] who indicated agency is reviewing for acceptance. Will follow along with  and assist with discharge planning as necessary. Anticipate discharge as soon as today if above plans are finalized. Katt Jolly, MSN, RN  [facility] Case Management  [phone]    Notified by Ijeoma with [facility] that agency has accepted patient for home care. Katt Jolly, MSN, RN  [facility] Case Management  [phone]    Call to daughter Ling Simmons, updated her on plans for probable discharge today. Katt Jolly, MSN, RN  [facility] Case Management  [phone]

## 2021-11-04 NOTE — PROGRESS NOTES
Pulmonary Progress Note    Admit Date: 10/25/2021  Hospital day                               PCP: Kassy Frias MD    Chief Complaint (s):  Patient Active Problem List   Diagnosis    Pneumonia due to organism    Chronic respiratory failure with hypoxia (HCC)    COPD exacerbation ([address]    Supratherapeutic INR    FAISAL (obstructive sleep apnea)    Leukocytosis    Dyspnea and respiratory abnormalities    HTN (hypertension)    Tobacco abuse    History of pulmonary embolism    History of DVT (deep vein thrombosis)    Subtherapeutic international normalized ratio (INR)    Chronic anemia    Iron deficiency anemia    Thrombocytosis    Pneumonia    Hypokalemia    Acute respiratory failure with hypercapnia (HCC)       Subjective:  · Avaps has been arranged for outpatient usage, the patient is waiting for discharge. at    Vitals:  VITALS:  /62   Pulse 91   Temp 98.3 °F (36.8 °C) (Oral)   Resp 21   Ht 5' 5\" (1.651 m)   Wt 218 lb (98.9 kg)   SpO2 98%   BMI 36.28 kg/m²     24HR INTAKE/OUTPUT:      Intake/Output Summary (Last 24 hours) at 2021 1745  Last data filed at 2021 1209  Gross per 24 hour   Intake 600 ml   Output 850 ml   Net -250 ml       24HR PULSE OXIMETRY RANGE:    SpO2  Av.5 %  Min: 95 %  Max: 98 %    Medications:  IV:   sodium chloride         Scheduled Meds:   warfarin  10 mg Oral Once per day on u    warfarin  7.5 mg Oral Once per day on Sun Tue Wed Fri Sat    pantoprazole  40 mg IntraVENous Daily    And    sodium chloride (PF)  10 mL IntraVENous Daily    senna  5 mL Oral Nightly    docusate sodium  100 mg Oral Daily    sodium chloride flush  5-40 mL IntraVENous 2 times per day    amLODIPine  2.5 mg Oral Daily    lisinopril  20 mg Oral BID    Arformoterol Tartrate  15 mcg Nebulization BID    ipratropium-albuterol  1 ampule Inhalation Q4H WA       Diet:   ADULT DIET; Regular;  No Added Salt (3-4 gm)  ADULT ORAL NUTRITION SUPPLEMENT; Lunch, Dinner; Frozen Oral Supplement     EXAM:  General: No distress. Alert. Lying flat  Eyes: PERRL. No sclera icterus. No conjunctival injection. ENT: No discharge. Pharynx clear. Neck: Trachea midline. Normal thyroid. Resp: No accessory muscle use. No rales. No wheezing. No rhonchi. CV: Regular rate. Regular rhythm. No murmur or rub. Abd: Non-tender. Non-distended. No masses. No organomegaly. Normal bowel sounds. Skin: Warm and dry. No nodule on exposed extremities. No rash on exposed extremities. Ext: No cyanosis, clubbing, edema  Lymph: No cervical LAD. No supraclavicular LAD. M/S: No cyanosis. No joint deformity. No clubbing. Neuro: Awake. Follows commands. Positive pupils/gag/corneals. Normal pain response. Results:  CBC:   Recent Labs     11/02/21  0303 11/03/21  0848 11/04/21  1038   WBC 13.0* 15.7* 18.1*   HGB 12.5 13.0 12.9   HCT 39.7 42.4 42.0   MCV 93.6 94.6 94.8    431 390     BMP:   Recent Labs     11/02/21  0303 11/03/21  0848 11/04/21  1038    142 140   K 4.4 4.1 4.0    99 99   CO2 29 33* 32*   PHOS 3.3 3.5 3.9   BUN 21 27* 27*   CREATININE 0.7 0.9 1.0     LIVER PROFILE:   Recent Labs     11/02/21  0303 11/03/21  0848 11/04/21  1038   AST 11 9 13   ALT 20 17 15   BILITOT 0.2 0.2 0.3   ALKPHOS 91 91 83     PT/INR:   Recent Labs     11/02/21  0356 11/03/21  0848 11/04/21  1038   PROTIME 25.1* 26.4* 21.4*   INR 2.3 2.4 1.9     APTT: No results for input(s): APTT in the last 72 hours. Pathology:  1. N/A      Microbiology:  1. None    Recent ABG:   Recent Labs     11/03/21  1454   PH 7.449   BE 5.4*   O2SAT 96.7     FiO2 : 40 %  I:E Ratio: 1:2.70    Recent Films:  XR CHEST PORTABLE   Final Result   Linear pleural and/or parenchymal scarring on the right. Nothing else active. XR CHEST PORTABLE   Final Result   No acute cardiopulmonary disease. XR CHEST PORTABLE   Final Result   No acute process.          XR CHEST PORTABLE   Final Result   No acute process. XR CHEST PORTABLE   Final Result   Small left-sided pleural effusion with adjacent left basilar airspace   disease, similar in appearance. Mild asymmetric right-sided airspace disease. Findings may be related to   asymmetric edema or pneumonia. Aeration in the right base is improved   compared to prior. XR CHEST PORTABLE   Final Result   Suboptimal inspiration and suspected atelectasis at the left base without   significant interval change. XR CHEST PORTABLE   Final Result   Suboptimal inspiration with suspected developing atelectasis on the left. XR CHEST PORTABLE   Final Result   1. Endotracheal tube terminates in the distal thoracic trachea. Atherosclerotic disease. Slight interval increase in interstitial markings. Possibly reflects underlying edema or potentially infection. Please   correlate with clinical presentation. 2.  Satisfactory position of the enteric tube. XR ABDOMEN FOR NG/OG/NE TUBE PLACEMENT   Final Result   1. Endotracheal tube terminates in the distal thoracic trachea. Atherosclerotic disease. Slight interval increase in interstitial markings. Possibly reflects underlying edema or potentially infection. Please   correlate with clinical presentation. 2.  Satisfactory position of the enteric tube. XR CHEST (2 VW)   Final Result   1. Left lung base infiltrate             Assessment:  1. Acute hypercapnic respiratory failure: Transferred from the ICU post extubation on 10/27. Using nocturnal ventilation and as needed ventilation. Blood gases confirm an excellent response to noninvasive ventilation. 2. History of DVT/PE: On Lovenox transitioning to Coumadin. 3. Pneumonia: Improved. 4. Possible superimposed CHF    Plan:  1. Continue nocturnal ventilation, this needs to be used during the day as well with any naps. 2. Continue anticoagulation  3. Stop steroids  4.  The patient will need a home ventilator to prevent readmission and premature death. Time at the bedside, reviewing labs and radiographs, reviewing updated notes and consultations, discussing with staff and family was more than 35 minutes. Please note that voice recognition technology was used in the preparation of this note and make therefore it may contain inadvertent transcription errors. If the patient is a COVID 19 isolation patient, the above physical exam reflects that of the examining physician for the day. Maryann Hodgkins, MD,  M.D., F.C.C.P.     [facility], [address] [place] [address], [address] [place] [address]

## 2021-11-04 NOTE — PATIENT CARE CONFERENCE
P Quality Flow/Interdisciplinary Rounds Progress Note        Quality Flow Rounds held on November 4, 2021    Disciplines Attending:  Bedside Nurse, ,  and Nursing Unit Leadership    Abbie Aguilar was admitted on 10/25/2021 11:18 AM    Anticipated Discharge Date:  Expected Discharge Date: 11/04/21    Disposition:    Juan R Score:  Juan R Scale Score: 19    Readmission Risk              Risk of Unplanned Readmission:  27           Discussed patient goal for the day, patient clinical progression, and barriers to discharge.   The following Goal(s) of the Day/Commitment(s) have been identified:  Dc planning    Candace Beck RN  November 4, 2021

## 2021-11-04 NOTE — PROGRESS NOTES
Reviewed DC instructions with patient's daughter. All questions answered.     Electronically signed by Cely Dennis RN on 11/4/2021 at 5:16 PM

## 2021-11-05 RX ORDER — PREDNISONE 20 MG/1
20 TABLET ORAL DAILY
Qty: 5 TABLET | Refills: 0 | Status: SHIPPED | OUTPATIENT
Start: 2021-11-05 | End: 2021-11-10

## 2021-11-20 NOTE — ED PROVIDER NOTES
HPI:  11/20/21,   Time: 3:51 AM MALLIKA Henson is a 80 y.o. female presenting to the ED for shortness of breath. Patient states symptoms ongoing for the past couple days but became worse last night. Patient has history of COPD. Patient states she recently tested herself for Covid but it was negative. Is vaccinated. Denies chest pain, cough, cold-like symptoms, fever, or any other complaints. ROS:   GEN: no fevers, no chills, no fatique  HENT: No trauma, no sore throat, no swelling throat or oral mucosa  EYES: No changes in vision, no pain  CARDIO: No chest pain, no palpitations  PULM: See HPI  ABD: No pain, no nausea, no vomiting  MSK: No pain, no trauma, no deformities  SKIN: No rashes, no abrasions, no lacerations  NEURO: No changes in mentation, no headache, no weakness     --------------------------------------------- PAST HISTORY ---------------------------------------------  Past Medical History:  has a past medical history of COPD (chronic obstructive pulmonary disease) ([facility] DVT (deep venous thrombosis) ([facility] Hypertension, and Pulmonary embolism ([facility] Past Surgical History:  has a past surgical history that includes Cholecystectomy; Mastectomy, bilateral (1988); Carpal tunnel release (Right); and Hysterectomy. Social History:  reports that she has been smoking cigarettes. She started smoking about 68 years ago. She has a 16.25 pack-year smoking history. She has never used smokeless tobacco. She reports previous alcohol use. She reports that she does not use drugs. Family History: family history includes Heart Attack in her mother; Other in her brother. The patients home medications have been reviewed.     Allergies: Carafate [sucralfate]    -------------------------------------------------- RESULTS -------------------------------------------------  All laboratory and radiology results have been personally reviewed by myself   LABS:  Results for orders placed or performed during the hospital encounter of 10/23/21   CBC Auto Differential   Result Value Ref Range    WBC 17.3 (H) 4.5 - 11.5 E9/L    RBC 5.19 3.50 - 5.50 E12/L    Hemoglobin 15.2 11.5 - 15.5 g/dL    Hematocrit 47.2 34.0 - 48.0 %    MCV 90.9 80.0 - 99.9 fL    MCH 29.3 26.0 - 35.0 pg    MCHC 32.2 32.0 - 34.5 %    RDW 14.3 11.5 - 15.0 fL    Platelets 599 578 - 185 E9/L    MPV 10.0 7.0 - 12.0 fL    Neutrophils % 84.5 (H) 43.0 - 80.0 %    Immature Granulocytes % 0.5 0.0 - 5.0 %    Lymphocytes % 7.3 (L) 20.0 - 42.0 %    Monocytes % 6.3 2.0 - 12.0 %    Eosinophils % 1.0 0.0 - 6.0 %    Basophils % 0.4 0.0 - 2.0 %    Neutrophils Absolute 14.61 (H) 1.80 - 7.30 E9/L    Immature Granulocytes # 0.08 E9/L    Lymphocytes Absolute 1.26 (L) 1.50 - 4.00 E9/L    Monocytes Absolute 1.09 (H) 0.10 - 0.95 E9/L    Eosinophils Absolute 0.18 0.05 - 0.50 E9/L    Basophils Absolute 0.07 0.00 - 0.20 E9/L   Comprehensive Metabolic Panel w/ Reflex to MG   Result Value Ref Range    Sodium 143 132 - 146 mmol/L    Potassium reflex Magnesium 3.3 (L) 3.5 - 5.0 mmol/L    Chloride 100 98 - 107 mmol/L    CO2 30 (H) 22 - 29 mmol/L    Anion Gap 13 7 - 16 mmol/L    Glucose 129 (H) 74 - 99 mg/dL    BUN 13 6 - 23 mg/dL    CREATININE 0.9 0.5 - 1.0 mg/dL    GFR Non-African American >60 >=60 mL/min/1.73    GFR African American >60     Calcium 9.6 8.6 - 10.2 mg/dL    Total Protein 7.8 6.4 - 8.3 g/dL    Albumin 4.2 3.5 - 5.2 g/dL    Total Bilirubin 0.8 0.0 - 1.2 mg/dL    Alkaline Phosphatase 142 (H) 35 - 104 U/L    ALT 7 0 - 32 U/L    AST 12 0 - 31 U/L   Troponin   Result Value Ref Range    Troponin, High Sensitivity 19 (H) 0 - 9 ng/L   Brain Natriuretic Peptide   Result Value Ref Range    Pro- (H) 0 - 450 pg/mL   Lactic Acid, Plasma   Result Value Ref Range    Lactic Acid 1.6 0.5 - 2.2 mmol/L   COVID-19   Result Value Ref Range    SARS-CoV-2, PCR Not Detected Not Detected   Magnesium   Result Value Ref Range    Magnesium 1.8 1.6 - 2.6 mg/dL   EKG 12 Lead   Result Value Ref Range    Ventricular Rate 102 BPM    Atrial Rate 102 BPM    P-R Interval 116 ms    QRS Duration 82 ms    Q-T Interval 398 ms    QTc Calculation (Bazett) 518 ms    P Axis 66 degrees    R Axis 76 degrees    T Axis 73 degrees       RADIOLOGY:  Interpreted by Radiologist.  XR CHEST (2 VW)   Final Result   No acute process. ------------------------- NURSING NOTES AND VITALS REVIEWED ---------------------------   The nursing notes within the ED encounter and vital signs as below have been reviewed. BP (!) 154/76   Pulse 99   Temp 98.4 °F (36.9 °C) (Axillary)   Resp 20   Ht 5' 5\" (1.651 m)   Wt 195 lb (88.5 kg)   SpO2 94%   BMI 32.45 kg/m²   Oxygen Saturation Interpretation: Normal    ---------------------------------------------------PHYSICAL EXAM--------------------------------------    GEN: No acute distress, well-appearing, well nourished   HENT: Normocephalic, atraumatic, oral mucosa moist  EYES: No scleral injection, no scleral icterus, PERRL   PULM: + Wheezes bilaterally, equal bilaterally, no respiratory distress, no retractions or accessory muscle use. CARDIO: Regular rate, regular rhythm, normal S1/S2  ABD: Soft, non-tender, no rigidity  MSK: No deformities, no edema, palpable pulses all extremities   SKIN: No rashes, no lacerations, no abrasions   NEURO: Awake, alert, appropriate         ------------------------------ ED COURSE/MEDICAL DECISION MAKING----------------------  Medications   ipratropium-albuterol (DUONEB) nebulizer solution 1 ampule (1 ampule Inhalation Given 10/23/21 0247)   predniSONE (DELTASONE) tablet 60 mg (60 mg Oral Given 10/23/21 2287)         ED Course and Medical Decision Making:   Patient with history of COPD presents with shortness of breath. No cough or cold-like symptoms. No chest pain. Nontoxic well-appearing hemodynamically stable. Patient treated appropriately in emergency department.   On reevaluation prior to disposition patient states symptoms improved, remains in no respiratory distress and remains hemodynamically stable. Started on course of steroids. Discharged home with appropriate recommendations and return precautions and recommendations for follow-up. Patient states understanding and agrees to plan.       --------------------------------- ADDITIONAL PROVIDER NOTES ---------------------------------    This patient's ED course included: re-evaluation prior to disposition and a personal history and physicial eaxmination    This patient has remained hemodynamically stable during their ED course. Counseling: The emergency provider has spoken with the patient and discussed todays results, in addition to providing specific details for the plan of care and counseling regarding the diagnosis and prognosis.   Questions are answered at this time and they are agreeable with the plan.      --------------------------------- IMPRESSION AND DISPOSITION ---------------------------------    IMPRESSION  1. COPD exacerbation ([facility]        DISPOSITION  Disposition: Discharge to home  Patient condition is good       Fabi Infante DO  11/20/21 0354

## 2021-12-01 NOTE — H&P
Dmitriy Maki M.D. History and Physical      CHIEF COMPLAINT:  Shortness of breath    Reason for Admission:  COPD    History Obtained From:  patient    HISTORY OF PRESENT ILLNESS:      The patient is a 80 y.o. female of Char Haddad MD  who presents with shortness of breath with a new productive cough. Patient was jsut recently seen in ER for COPD exacerbation. At that time patient did received breathing treatment and steroids. She felt better and patient was discharged home. Patient admits she woke up with a new cough and shortness of breath, that is worsened on exertion and relieved mildly with rest. Patient denies chest pain, fever, chills, headache, dizziness, blurred vision and abdominal pain. Patient is to be admitted to telemetry unit for further testing and treatment. All labs personally reviewed   All imaging personally reviewed     Past Medical History:        Diagnosis Date    [facility]ch[facility] obstructive [facility]) ([facility]     DVT (deep venous thrombosis) ([facility])     Hypertension     Pulmonary embolism (HCC)      Past Surgical History:        Procedure Laterality Date    CARPAL TUNNEL RELEASE Right     CHOLECYSTECTOMY      HYSTERECTOMY      partial    MASTECTOMY, BILATERAL  1988         Medications Prior to Admission:    No medications prior to admission. Allergies:  Carafate [sucralfate]    Social History:   TOBACCO:   reports that she has been smoking cigarettes. She started smoking about 68 years ago. She has a 16.25 pack-year smoking history. She has never used smokeless tobacco.  ETOH:   reports previous alcohol use.   MARITAL STATUS:    OCCUPATION:      Family History:       Problem Relation Age of Onset    Heart Attack Mother     Other Brother         blood clots       REVIEW OF SYSTEMS:    General ROS: Alert and oriented  Hematological and Lymphatic ROS: negative  Endocrine ROS: negative  Respiratory ROS: no cough, shortness of breath, or wheezing  Cardiovascular ROS: no chest pain or dyspnea on exertion  Gastrointestinal ROS: no abdominal pain, change in bowel habits, or black or bloody stools  Genito-Urinary ROS: no dysuria, trouble voiding, or hematuria  Neurological ROS: no TIA or stroke symptoms  negative    Vitals:  /62   Pulse 91   Temp 98.3 °F (36.8 °C) (Oral)   Resp 21   Ht 5' 5\" (1.651 m)   Wt 218 lb (98.9 kg)   SpO2 98%   BMI 36.28 kg/m²     PHYSICAL EXAM:  General:  Awake, alert, oriented X 3. Well developed, well nourished, well groomed. No apparent distress. HEENT:  Normocephalic, atraumatic. Pupils equal, round, reactive to light. No scleral icterus. No conjunctival injection. Normal lips, teeth, and gums. No nasal discharge. Neck:  Supple, FROM  Heart:  RRR, no murmurs, gallops, rubs, carotid upstroke normal, no carotid bruits  Lungs: Inspiratory and expiratory wheezes  Abdomen: Bowel sounds present, soft, nontender, no masses, no organomegaly, no peritoneal signs  Extremities:  No clubbing, cyanosis, or edema  Skin:  Warm and dry, no open lesions or rash  Neuro:  Cranial nerves 2-12 intact, no focal deficits  Vascular: Radial and pedal Pulses 2+        DATA:     No results for input(s): WBC, HGB, PLT in the last 72 hours. No results for input(s): NA, K, BUN, CREATININE in the last 72 hours. No results for input(s): PROT, INR in the last 72 hours. No results for input(s): AST, ALT, ALKPHOS, BILIDIR, BILITOT in the last 72 hours. No results for input(s): BNP in the last 72 hours. No results for input(s): CKTOTAL, CKMB, CKMBINDEX, TROPONINI in the last 72 hours. ASSESSMENT:      Principal Problem:    Pneumonia  Active Problems:    HTN (hypertension)    Hypokalemia    Acute respiratory failure with hypercapnia (HCC)  Resolved Problems:    * No resolved hospital problems.  *         PLAN:  80year old female with a past medical history of COPD admitted with     Pneumonia  IV antibitoics  Supplement O2 to

## 2022-03-29 ENCOUNTER — APPOINTMENT (OUTPATIENT)
Dept: GENERAL RADIOLOGY | Age: 82
End: 2022-03-29
Payer: MEDICARE

## 2022-03-29 ENCOUNTER — HOSPITAL ENCOUNTER (EMERGENCY)
Age: 82
Discharge: HOME | End: 2022-03-29
Payer: MEDICARE

## 2022-03-29 VITALS
RESPIRATION RATE: 20 BRPM | OXYGEN SATURATION: 98 % | DIASTOLIC BLOOD PRESSURE: 65 MMHG | WEIGHT: 218 LBS | HEART RATE: 100 BPM | BODY MASS INDEX: 36.28 KG/M2 | SYSTOLIC BLOOD PRESSURE: 141 MMHG | TEMPERATURE: 98 F

## 2022-03-29 DIAGNOSIS — Z87.09 HISTORY OF COPD: ICD-10-CM

## 2022-03-29 DIAGNOSIS — R05.9 COUGH: Primary | ICD-10-CM

## 2022-03-29 LAB
INFLUENZA A BY PCR: NOT DETECTED
INFLUENZA B BY PCR: NOT DETECTED
SARS-COV-2, NAAT: NOT DETECTED

## 2022-03-29 PROCEDURE — 71046 X-RAY EXAM CHEST 2 VIEWS: CPT

## 2022-03-29 PROCEDURE — 87502 INFLUENZA DNA AMP PROBE: CPT

## 2022-03-29 PROCEDURE — 87635 SARS-COV-2 COVID-19 AMP PRB: CPT

## 2022-03-29 PROCEDURE — 99285 EMERGENCY DEPT VISIT HI MDM: CPT

## 2022-03-29 RX ORDER — DOXYCYCLINE HYCLATE 100 MG
100 TABLET ORAL 2 TIMES DAILY
Qty: 14 TABLET | Refills: 0 | Status: SHIPPED | OUTPATIENT
Start: 2022-03-29 | End: 2022-04-05

## 2022-03-29 RX ORDER — BENZONATATE 100 MG/1
100 CAPSULE ORAL 3 TIMES DAILY PRN
Qty: 42 CAPSULE | Refills: 0 | Status: SHIPPED | OUTPATIENT
Start: 2022-03-29 | End: 2022-04-12

## 2022-03-29 RX ORDER — METHYLPREDNISOLONE 4 MG/1
TABLET ORAL
Qty: 21 TABLET | Status: SHIPPED | OUTPATIENT
Start: 2022-03-29 | End: 2022-04-04

## 2022-03-29 RX ORDER — CEFDINIR 300 MG/1
300 CAPSULE ORAL 2 TIMES DAILY
Qty: 14 CAPSULE | Refills: 0 | Status: SHIPPED | OUTPATIENT
Start: 2022-03-29 | End: 2022-04-05

## 2022-03-29 NOTE — ED PROVIDER NOTES
[facility]     Department of Emergency Medicine   ED  Encounter Note  Admit Date/RoomTime: No admission date for patient encounter. ED Room: Room/bed info not found    NAME: Ольга Cuello  : 1940  MRN: [id number]     Chief Complaint:  Cough (cough, hx COPD, wears 2L NC all the time )    History of Present Illness        Ольга Cuello is a 80 y.o. old female who presents to the emergency department by private vehicle, with gradual onset productive cough with sputum described as \"phlegm\" which began \"a couple\" days prior to arrival.  The symptoms are associated with no additional symptoms as it relates to today's visit and there has been no abdominal pain, decreased appetite, chest tightness, conjunctivitis, watery eyes, nausea, vomiting, diarrhea, dizziness, dysuria, urinary frequency, earache, fever, fatigue, headache, hoarseness, joint swelling, malaise, muscle aches, nasal congestion, runny nose, neck stiffness, rash, sneezing, sore throat, scratchy throat, swollen glands, wheezing, loss of taste or loss of smell. She has prior history of COPD in the past.  Since onset the symptoms have been intermittent and mild-moderate in severity. The symptoms are aggravated by nothing in particular and relieved by nothing in particular. The patient has been fully vaccinated against COVID-19    ROS   Pertinent positives and negatives are stated within HPI, all other systems reviewed and are negative. Past Medical History:  has a past medical history of COPD (chronic obstructive pulmonary disease) ([address] DVT (deep venous thrombosis) ([address] Hypertension, and Pulmonary embolism ([address] Surgical History:  has a past surgical history that includes Cholecystectomy; Mastectomy, bilateral (); Carpal tunnel release (Right); and Hysterectomy. Social History:  reports that she has been smoking cigarettes. She started smoking about 69 years ago. She has a 16.25 pack-year smoking history.  She has never used smokeless tobacco. She reports previous alcohol use. She reports that she does not use drugs. Family History: family history includes Heart Attack in her mother; Other in her brother. Allergies: Carafate [sucralfate]    Physical Exam   Oxygen Saturation Interpretation: Normal on room air analysis. ED Triage Vitals [03/29/22 1455]   BP Temp Temp src Pulse Resp SpO2 Height Weight   (!) 141/65 98 °F (36.7 °C) -- 100 20 98 % -- 218 lb (98.9 kg)       Constitutional:  Alert, development consistent with age. HEENT:  NC/NT. Airway patent. normal TM's and external ear canals bilaterally. Neck:  Normal ROM. Supple. Respiratory:  Breath sounds: equal bilaterally. Lung sounds: normal.   CV:  Regular rate and rhythm, normal heart sounds, without pathological murmurs, ectopy, gallops, or rubs. .  Integument:  Normal turgor. Warm, dry, without visible rash. Lymphatic:  Edema:  non-pitting Bilateral lower extremity(s). Patient states \"they're always swelled. \"  Neurological:  Oriented. Motor functions intact. Lab / Imaging Results   (All laboratory and radiology results have been personally reviewed by myself)  Labs:  Results for orders placed or performed during the hospital encounter of 03/29/22   COVID-19, Rapid    Specimen: Nasopharyngeal Swab   Result Value Ref Range    SARS-CoV-2, NAAT Not Detected Not Detected   Rapid influenza A/B antigens    Specimen: Nasopharyngeal   Result Value Ref Range    Influenza A by PCR Not Detected Not Detected    Influenza B by PCR Not Detected Not Detected     Imaging: All Radiology results interpreted by Radiologist unless otherwise noted. XR CHEST (2 VW)   Final Result   COPD. There is no evidence of pneumonia or failure. ED Course / Medical Decision Making   Medications - No data to display     Consult(s):   None    Procedure(s):   none    Medical Decision Making: Results discussed.   Patient well-appearing appropriate discharge and outpatient follow-up. We will provide her with the prescriptions below: Antibiotics due to history of oxygen dependent COPD. She has been encouraged to follow-up with her primary care provider later this week. Return for any new or worsening symptoms. Assessment      1. Cough    2. History of COPD       Plan   Discharged home. Patient condition is good    New Medications     New Prescriptions    BENZONATATE (TESSALON PERLES) 100 MG CAPSULE    Take 1 capsule by mouth 3 times daily as needed for Cough    CEFDINIR (OMNICEF) 300 MG CAPSULE    Take 1 capsule by mouth 2 times daily for 7 days    DOXYCYCLINE HYCLATE (VIBRA-TABS) 100 MG TABLET    Take 1 tablet by mouth 2 times daily for 7 days    METHYLPREDNISOLONE (MEDROL, HODAN,) 4 MG TABLET    Take as directed on package insert days 1-6     Electronically signed by Orlando Espitia PA-C   DD: 3/29/22  **This report was transcribed using voice recognition software. Every effort was made to ensure accuracy; however, inadvertent computerized transcription errors may be present.   END OF ED PROVIDER NOTE        Orlando Espitia PA-C  03/29/22 4091

## 2022-03-29 NOTE — Clinical Note
Tai Romero was seen and treated in our emergency department on 3/29/2022. She may return to work on 03/30/2022. If you have any questions or concerns, please don't hesitate to call.       Wayne Tracy PA-C

## 2022-05-10 ENCOUNTER — APPOINTMENT (OUTPATIENT)
Dept: CT IMAGING | Age: 82
End: 2022-05-10
Payer: MEDICARE

## 2022-05-10 ENCOUNTER — APPOINTMENT (OUTPATIENT)
Dept: GENERAL RADIOLOGY | Age: 82
End: 2022-05-10
Payer: MEDICARE

## 2022-05-10 ENCOUNTER — HOSPITAL ENCOUNTER (OUTPATIENT)
Age: 82
Setting detail: OBSERVATION
Discharge: HOME | End: 2022-05-12
Attending: EMERGENCY MEDICINE | Admitting: INTERNAL MEDICINE
Payer: MEDICARE

## 2022-05-10 DIAGNOSIS — R07.9 CHEST PAIN, UNSPECIFIED TYPE: Primary | ICD-10-CM

## 2022-05-10 PROBLEM — J43.9 EMPHYSEMA LUNG (HCC): Status: ACTIVE | Noted: 2022-05-10

## 2022-05-10 LAB
ALBUMIN SERPL-MCNC: 4.2 G/DL (ref 3.5–5.2)
ALP BLD-CCNC: 109 U/L (ref 35–104)
ALT SERPL-CCNC: 8 U/L (ref 0–32)
ANION GAP SERPL CALCULATED.3IONS-SCNC: 9 MMOL/L (ref 7–16)
AST SERPL-CCNC: 15 U/L (ref 0–31)
BACTERIA: NORMAL /HPF
BASOPHILS ABSOLUTE: 0.04 E9/L (ref 0–0.2)
BASOPHILS RELATIVE PERCENT: 0.3 % (ref 0–2)
BILIRUB SERPL-MCNC: 0.3 MG/DL (ref 0–1.2)
BILIRUBIN URINE: NEGATIVE
BLOOD, URINE: NEGATIVE
BUN BLDV-MCNC: 20 MG/DL (ref 6–23)
CALCIUM SERPL-MCNC: 9.5 MG/DL (ref 8.6–10.2)
CHLORIDE BLD-SCNC: 97 MMOL/L (ref 98–107)
CLARITY: CLEAR
CO2: 31 MMOL/L (ref 22–29)
COLOR: YELLOW
CREAT SERPL-MCNC: 1.1 MG/DL (ref 0.5–1)
EOSINOPHILS ABSOLUTE: 0.06 E9/L (ref 0.05–0.5)
EOSINOPHILS RELATIVE PERCENT: 0.5 % (ref 0–6)
GFR AFRICAN AMERICAN: 58
GFR NON-AFRICAN AMERICAN: 48 ML/MIN/1.73
GLUCOSE BLD-MCNC: 116 MG/DL (ref 74–99)
GLUCOSE URINE: NEGATIVE MG/DL
HCT VFR BLD CALC: 39.2 % (ref 34–48)
HEMOGLOBIN: 12.2 G/DL (ref 11.5–15.5)
IMMATURE GRANULOCYTES #: 0.05 E9/L
IMMATURE GRANULOCYTES %: 0.4 % (ref 0–5)
INR BLD: 2.9
KETONES, URINE: NEGATIVE MG/DL
LEUKOCYTE ESTERASE, URINE: ABNORMAL
LYMPHOCYTES ABSOLUTE: 1.65 E9/L (ref 1.5–4)
LYMPHOCYTES RELATIVE PERCENT: 12.6 % (ref 20–42)
MCH RBC QN AUTO: 26 PG (ref 26–35)
MCHC RBC AUTO-ENTMCNC: 31.1 % (ref 32–34.5)
MCV RBC AUTO: 83.4 FL (ref 80–99.9)
MONOCYTES ABSOLUTE: 0.84 E9/L (ref 0.1–0.95)
MONOCYTES RELATIVE PERCENT: 6.4 % (ref 2–12)
NEUTROPHILS ABSOLUTE: 10.41 E9/L (ref 1.8–7.3)
NEUTROPHILS RELATIVE PERCENT: 79.8 % (ref 43–80)
NITRITE, URINE: NEGATIVE
PDW BLD-RTO: 15.3 FL (ref 11.5–15)
PH UA: 6 (ref 5–9)
PLATELET # BLD: 479 E9/L (ref 130–450)
PMV BLD AUTO: 9.9 FL (ref 7–12)
POTASSIUM REFLEX MAGNESIUM: 4 MMOL/L (ref 3.5–5)
PRO-BNP: 668 PG/ML (ref 0–450)
PROTEIN UA: NEGATIVE MG/DL
PROTHROMBIN TIME: 32 SEC (ref 9.3–12.4)
RBC # BLD: 4.7 E12/L (ref 3.5–5.5)
RBC UA: NORMAL /HPF (ref 0–2)
SODIUM BLD-SCNC: 137 MMOL/L (ref 132–146)
SPECIFIC GRAVITY UA: 1.01 (ref 1–1.03)
TOTAL PROTEIN: 7.7 G/DL (ref 6.4–8.3)
TROPONIN, HIGH SENSITIVITY: 22 NG/L (ref 0–9)
TROPONIN, HIGH SENSITIVITY: 22 NG/L (ref 0–9)
UROBILINOGEN, URINE: 0.2 E.U./DL
WBC # BLD: 13.1 E9/L (ref 4.5–11.5)
WBC UA: NORMAL /HPF (ref 0–5)

## 2022-05-10 PROCEDURE — 84484 ASSAY OF TROPONIN QUANT: CPT

## 2022-05-10 PROCEDURE — 6370000000 HC RX 637 (ALT 250 FOR IP): Performed by: STUDENT IN AN ORGANIZED HEALTH CARE EDUCATION/TRAINING PROGRAM

## 2022-05-10 PROCEDURE — 2580000003 HC RX 258: Performed by: STUDENT IN AN ORGANIZED HEALTH CARE EDUCATION/TRAINING PROGRAM

## 2022-05-10 PROCEDURE — 6360000004 HC RX CONTRAST MEDICATION: Performed by: RADIOLOGY

## 2022-05-10 PROCEDURE — G0378 HOSPITAL OBSERVATION PER HR: HCPCS

## 2022-05-10 PROCEDURE — 71275 CT ANGIOGRAPHY CHEST: CPT

## 2022-05-10 PROCEDURE — 2580000003 HC RX 258: Performed by: RADIOLOGY

## 2022-05-10 PROCEDURE — 71046 X-RAY EXAM CHEST 2 VIEWS: CPT

## 2022-05-10 PROCEDURE — 80053 COMPREHEN METABOLIC PANEL: CPT

## 2022-05-10 PROCEDURE — 6370000000 HC RX 637 (ALT 250 FOR IP): Performed by: NURSE PRACTITIONER

## 2022-05-10 PROCEDURE — 93005 ELECTROCARDIOGRAM TRACING: CPT | Performed by: STUDENT IN AN ORGANIZED HEALTH CARE EDUCATION/TRAINING PROGRAM

## 2022-05-10 PROCEDURE — 81001 URINALYSIS AUTO W/SCOPE: CPT

## 2022-05-10 PROCEDURE — 87088 URINE BACTERIA CULTURE: CPT

## 2022-05-10 PROCEDURE — 83880 ASSAY OF NATRIURETIC PEPTIDE: CPT

## 2022-05-10 PROCEDURE — 85610 PROTHROMBIN TIME: CPT

## 2022-05-10 PROCEDURE — 2580000003 HC RX 258: Performed by: NURSE PRACTITIONER

## 2022-05-10 PROCEDURE — 85025 COMPLETE CBC W/AUTO DIFF WBC: CPT

## 2022-05-10 PROCEDURE — 99285 EMERGENCY DEPT VISIT HI MDM: CPT

## 2022-05-10 RX ORDER — ACETAMINOPHEN 325 MG/1
650 TABLET ORAL EVERY 6 HOURS PRN
Status: DISCONTINUED | OUTPATIENT
Start: 2022-05-10 | End: 2022-05-12 | Stop reason: HOSPADM

## 2022-05-10 RX ORDER — SODIUM CHLORIDE 0.9 % (FLUSH) 0.9 %
10 SYRINGE (ML) INJECTION PRN
Status: COMPLETED | OUTPATIENT
Start: 2022-05-10 | End: 2022-05-10

## 2022-05-10 RX ORDER — 0.9 % SODIUM CHLORIDE 0.9 %
1000 INTRAVENOUS SOLUTION INTRAVENOUS ONCE
Status: COMPLETED | OUTPATIENT
Start: 2022-05-10 | End: 2022-05-10

## 2022-05-10 RX ORDER — SODIUM CHLORIDE 0.9 % (FLUSH) 0.9 %
5-40 SYRINGE (ML) INJECTION PRN
Status: DISCONTINUED | OUTPATIENT
Start: 2022-05-10 | End: 2022-05-12 | Stop reason: HOSPADM

## 2022-05-10 RX ORDER — ARFORMOTEROL TARTRATE 15 UG/2ML
15 SOLUTION RESPIRATORY (INHALATION) 2 TIMES DAILY
Status: DISCONTINUED | OUTPATIENT
Start: 2022-05-11 | End: 2022-05-12 | Stop reason: HOSPADM

## 2022-05-10 RX ORDER — LISINOPRIL 20 MG/1
20 TABLET ORAL 2 TIMES DAILY
Status: DISCONTINUED | OUTPATIENT
Start: 2022-05-10 | End: 2022-05-12 | Stop reason: HOSPADM

## 2022-05-10 RX ORDER — ACETAMINOPHEN 500 MG
1000 TABLET ORAL ONCE
Status: COMPLETED | OUTPATIENT
Start: 2022-05-10 | End: 2022-05-10

## 2022-05-10 RX ORDER — PANTOPRAZOLE SODIUM 40 MG/1
40 TABLET, DELAYED RELEASE ORAL 2 TIMES DAILY
Status: DISCONTINUED | OUTPATIENT
Start: 2022-05-10 | End: 2022-05-12 | Stop reason: HOSPADM

## 2022-05-10 RX ORDER — IPRATROPIUM BROMIDE AND ALBUTEROL SULFATE 2.5; .5 MG/3ML; MG/3ML
3 SOLUTION RESPIRATORY (INHALATION) EVERY 6 HOURS PRN
Status: DISCONTINUED | OUTPATIENT
Start: 2022-05-10 | End: 2022-05-12 | Stop reason: HOSPADM

## 2022-05-10 RX ORDER — SODIUM CHLORIDE 9 MG/ML
INJECTION, SOLUTION INTRAVENOUS PRN
Status: DISCONTINUED | OUTPATIENT
Start: 2022-05-10 | End: 2022-05-12 | Stop reason: HOSPADM

## 2022-05-10 RX ORDER — ACETAMINOPHEN 650 MG/1
650 SUPPOSITORY RECTAL EVERY 6 HOURS PRN
Status: DISCONTINUED | OUTPATIENT
Start: 2022-05-10 | End: 2022-05-12 | Stop reason: HOSPADM

## 2022-05-10 RX ORDER — AMLODIPINE BESYLATE 2.5 MG/1
2.5 TABLET ORAL DAILY
Status: DISCONTINUED | OUTPATIENT
Start: 2022-05-11 | End: 2022-05-12 | Stop reason: HOSPADM

## 2022-05-10 RX ORDER — NITROGLYCERIN 0.4 MG/1
0.4 TABLET SUBLINGUAL EVERY 5 MIN PRN
Status: DISCONTINUED | OUTPATIENT
Start: 2022-05-10 | End: 2022-05-12 | Stop reason: HOSPADM

## 2022-05-10 RX ORDER — PROCHLORPERAZINE EDISYLATE 5 MG/ML
5 INJECTION INTRAMUSCULAR; INTRAVENOUS EVERY 6 HOURS PRN
Status: DISCONTINUED | OUTPATIENT
Start: 2022-05-10 | End: 2022-05-12 | Stop reason: HOSPADM

## 2022-05-10 RX ORDER — SODIUM CHLORIDE 0.9 % (FLUSH) 0.9 %
5-40 SYRINGE (ML) INJECTION EVERY 12 HOURS SCHEDULED
Status: DISCONTINUED | OUTPATIENT
Start: 2022-05-10 | End: 2022-05-12 | Stop reason: HOSPADM

## 2022-05-10 RX ORDER — FERROUS SULFATE 325(65) MG
325 TABLET ORAL
Status: DISCONTINUED | OUTPATIENT
Start: 2022-05-11 | End: 2022-05-12 | Stop reason: HOSPADM

## 2022-05-10 RX ADMIN — IOPAMIDOL 75 ML: 755 INJECTION, SOLUTION INTRAVENOUS at 20:38

## 2022-05-10 RX ADMIN — PANTOPRAZOLE SODIUM 40 MG: 40 TABLET, DELAYED RELEASE ORAL at 21:56

## 2022-05-10 RX ADMIN — ASPIRIN 325 MG: 325 TABLET, COATED ORAL at 20:48

## 2022-05-10 RX ADMIN — ACETAMINOPHEN 1000 MG: 500 TABLET ORAL at 20:48

## 2022-05-10 RX ADMIN — SODIUM CHLORIDE, PRESERVATIVE FREE 10 ML: 5 INJECTION INTRAVENOUS at 21:56

## 2022-05-10 RX ADMIN — SODIUM CHLORIDE 1000 ML: 9 INJECTION, SOLUTION INTRAVENOUS at 20:00

## 2022-05-10 RX ADMIN — SODIUM CHLORIDE, PRESERVATIVE FREE 10 ML: 5 INJECTION INTRAVENOUS at 20:38

## 2022-05-10 RX ADMIN — LISINOPRIL 20 MG: 20 TABLET ORAL at 21:56

## 2022-05-10 ASSESSMENT — ENCOUNTER SYMPTOMS
BACK PAIN: 0
CHEST TIGHTNESS: 0
DIARRHEA: 0
ABDOMINAL PAIN: 0
VOMITING: 0
NAUSEA: 0
SHORTNESS OF BREATH: 0
COUGH: 0
ABDOMINAL DISTENTION: 0
SORE THROAT: 0

## 2022-05-10 ASSESSMENT — PAIN SCALES - GENERAL: PAINLEVEL_OUTOF10: 0

## 2022-05-10 NOTE — Clinical Note
Discharge Plan[de-identified] Other/[facility])   Telemetry/Cardiac Monitoring Required?: Yes

## 2022-05-10 NOTE — ED PROVIDER NOTES
HPI     Patient is a 80 y.o. female presents with a chief complaint of rib pain. This has been occurring for a few hours. Patient states that it gets better with sitting still. Patient states that it gets worse with moving and coughing. Patient states that it is moderate in severity. Patient states it was acute in onset. Patient stated that she woke up with this pain this morning. Patient takes Coumadin at home. Patient denies any shortness of breath. Patient is chronically on 2 L of oxygen at home. Patient denies any changes in urinary or bowel habits. Patient states that she has never had symptoms like this before. Patient denies any relieving factors. Patient stated that it hurts to move when she gets up on the walker. Patient also states that it was painful with palpation as well. Review of Systems   Constitutional: Negative for activity change, appetite change, chills, fatigue and fever. HENT: Negative for congestion, drooling and sore throat. Respiratory: Negative for cough, chest tightness and shortness of breath. Cardiovascular: Positive for chest pain. Negative for palpitations. Gastrointestinal: Negative for abdominal distention, abdominal pain, diarrhea, nausea and vomiting. Genitourinary: Negative for decreased urine volume, difficulty urinating, enuresis, flank pain, frequency and hematuria. Musculoskeletal: Negative for arthralgias, back pain and neck stiffness. Skin: Negative for rash and wound. Neurological: Negative for dizziness, facial asymmetry, light-headedness and headaches. Psychiatric/Behavioral: Negative for agitation, confusion and decreased concentration. Physical Exam  Vitals and nursing note reviewed. Constitutional:       Appearance: She is well-developed. HENT:      Head: Normocephalic and atraumatic. Eyes:      Conjunctiva/sclera: Conjunctivae normal.   Cardiovascular:      Rate and Rhythm: Normal rate and regular rhythm. Heart sounds: Normal heart sounds. No murmur heard. Comments: Reproducible chest pain to palpation right chest.  Pulmonary:      Effort: Pulmonary effort is normal. No respiratory distress. Breath sounds: Normal breath sounds. No wheezing or rales. Abdominal:      General: Bowel sounds are normal.      Palpations: Abdomen is soft. Tenderness: There is no abdominal tenderness. There is no guarding or rebound. Musculoskeletal:      Cervical back: Normal range of motion and neck supple. Skin:     General: Skin is warm and dry. Neurological:      Mental Status: She is alert and oriented to person, place, and time. Cranial Nerves: No cranial nerve deficit. Coordination: Coordination normal.          Procedures     MDM     The HEART Risk Score for Acute Coronary Syndrome  Age <46 years, 55-63, 65+  ?  2   > 2 Risk Factors for CAD?*, 1-2, 0  2   History: slight, moderate, highly suspicious  0   EKG: Normal, nonspecific repolarization changes, ST depression   1       Total HEART Score: 5     *Risk factors as follows:                  - Family history of CAD    - Hypertension      - Hyperlipidemia     - Diabetes mellitus  - Current smoker  - Obesity    Predicts 6-week risk of major adverse cardiac event. Low Score (0-3 points), risk of MACE of 0.9-1.7%. Moderate Score (4-6 points), risk of MACE of 12-16.6%. High Score (7-10 points), risk of MACE of 50-65%. ED Course as of 05/10/22 1953   Tue May 10, 2022   1859 EKG read interpreted by me. Rate 103 bpm.  [place]. [place].  QRS. No ST elevations or depressions. Nonspecific T wave abnormality. Compared to prior EKG with no acute changes. [JM]      ED Course User Index  [JM] Halima Vieira MD      Patient is a 80 y.o. female presenting with chest pain. Patient stated that she woke up with this chest pain. Exacerbated by movement. Patient denies any fevers or chills. Patient's heart score is 5. Patient's EKG is benign.   Patient's delta troponin is benign. Patient will have a CT of the chest.  Patient denies any changes in urinary or bowel habits. Patient is coagulated appropriately. Discussed case with internal medicine who agreed to admit patient for cardiac evaluation. Patient was seen and evaluated by myself and my attending Dr. Trang Barajas and Plan discussed with attending provider, please see attestation for final plan of care. This note was done using dictation software and there may be some grammatical errors associated with this. Prema Magaña MD       ED Course as of 05/10/22 2024   Tue May 10, 2022   6982 EKG read interpreted by me. Rate 103 bpm.  [place]. [place].  QRS. No ST elevations or depressions. Nonspecific T wave abnormality. Compared to prior EKG with no acute changes. [JM]      ED Course User Index  [JM] Prema Magaña MD       --------------------------------------------- PAST HISTORY ---------------------------------------------  Past Medical History:  has a past medical history of COPD (chronic obstructive pulmonary disease) ([facility] DVT (deep venous thrombosis) ([facility] Hypertension, and Pulmonary embolism ([facility] Past Surgical History:  has a past surgical history that includes Cholecystectomy; Mastectomy, bilateral (1988); Carpal tunnel release (Right); and Hysterectomy. Social History:  reports that she has been smoking cigarettes. She started smoking about 69 years ago. She has a 16.25 pack-year smoking history. She has never used smokeless tobacco. She reports previous alcohol use. She reports that she does not use drugs. Family History: family history includes Heart Attack in her mother; Other in her brother. The patients home medications have been reviewed.     Allergies: Carafate [sucralfate]    -------------------------------------------------- RESULTS -------------------------------------------------    LABS:  Results for orders placed or performed during the hospital encounter of 05/10/22   CBC with Auto Differential   Result Value Ref Range    WBC 13.1 (H) 4.5 - 11.5 E9/L    RBC 4.70 3.50 - 5.50 E12/L    Hemoglobin 12.2 11.5 - 15.5 g/dL    Hematocrit 39.2 34.0 - 48.0 %    MCV 83.4 80.0 - 99.9 fL    MCH 26.0 26.0 - 35.0 pg    MCHC 31.1 (L) 32.0 - 34.5 %    RDW 15.3 (H) 11.5 - 15.0 fL    Platelets 398 (H) 120 - 450 E9/L    MPV 9.9 7.0 - 12.0 fL    Neutrophils % 79.8 43.0 - 80.0 %    Immature Granulocytes % 0.4 0.0 - 5.0 %    Lymphocytes % 12.6 (L) 20.0 - 42.0 %    Monocytes % 6.4 2.0 - 12.0 %    Eosinophils % 0.5 0.0 - 6.0 %    Basophils % 0.3 0.0 - 2.0 %    Neutrophils Absolute 10.41 (H) 1.80 - 7.30 E9/L    Immature Granulocytes # 0.05 E9/L    Lymphocytes Absolute 1.65 1.50 - 4.00 E9/L    Monocytes Absolute 0.84 0.10 - 0.95 E9/L    Eosinophils Absolute 0.06 0.05 - 0.50 E9/L    Basophils Absolute 0.04 0.00 - 0.20 E9/L   Comprehensive Metabolic Panel w/ Reflex to MG   Result Value Ref Range    Sodium 137 132 - 146 mmol/L    Potassium reflex Magnesium 4.0 3.5 - 5.0 mmol/L    Chloride 97 (L) 98 - 107 mmol/L    CO2 31 (H) 22 - 29 mmol/L    Anion Gap 9 7 - 16 mmol/L    Glucose 116 (H) 74 - 99 mg/dL    BUN 20 6 - 23 mg/dL    CREATININE 1.1 (H) 0.5 - 1.0 mg/dL    GFR Non-African American 48 >=60 mL/min/1.73    GFR African American 58     Calcium 9.5 8.6 - 10.2 mg/dL    Total Protein 7.7 6.4 - 8.3 g/dL    Albumin 4.2 3.5 - 5.2 g/dL    Total Bilirubin 0.3 0.0 - 1.2 mg/dL    Alkaline Phosphatase 109 (H) 35 - 104 U/L    ALT 8 0 - 32 U/L    AST 15 0 - 31 U/L   Troponin   Result Value Ref Range    Troponin, High Sensitivity 22 (H) 0 - 9 ng/L   Brain Natriuretic Peptide   Result Value Ref Range    Pro- (H) 0 - 450 pg/mL   Urinalysis   Result Value Ref Range    Color, UA Yellow Straw/Yellow    Clarity, UA Clear Clear    Glucose, Ur Negative Negative mg/dL    Bilirubin Urine Negative Negative    Ketones, Urine Negative Negative mg/dL    Specific Gravity, UA 1.010 1.005 - 1.030    Blood, Urine Negative Negative    pH, UA 6.0 5.0 - 9.0    Protein, UA Negative Negative mg/dL    Urobilinogen, Urine 0.2 <2.0 E.U./dL    Nitrite, Urine Negative Negative    Leukocyte Esterase, Urine TRACE (A) Negative   Protime-INR   Result Value Ref Range    Protime 32.0 (H) 9.3 - 12.4 sec    INR 2.9    Troponin   Result Value Ref Range    Troponin, High Sensitivity 22 (H) 0 - 9 ng/L   Microscopic Urinalysis   Result Value Ref Range    WBC, UA 2-5 0 - 5 /HPF    RBC, UA NONE 0 - 2 /HPF    Bacteria, UA NONE SEEN None Seen /HPF   EKG 12 Lead   Result Value Ref Range    Ventricular Rate 103 BPM    Atrial Rate 103 BPM    P-R Interval 118 ms    QRS Duration 66 ms    Q-T Interval 360 ms    QTc Calculation (Bazett) 471 ms    P Axis 46 degrees    R Axis 48 degrees    T Axis 69 degrees       RADIOLOGY:  XR CHEST (2 VW)   Final Result   1. Chronic upper lobe emphysematous changes. 2.  No definite pneumonia or effusion. CTA PULMONARY W CONTRAST    (Results Pending)           ------------------------- NURSING NOTES AND VITALS REVIEWED ---------------------------  Date / Time Roomed:  5/10/2022  5:19 PM  ED Bed Assignment:  [facility]/[place] [id number]    The nursing notes within the ED encounter and vital signs as below have been reviewed. Patient Vitals for the past 24 hrs:   BP Temp Temp src Pulse Resp SpO2 Weight   05/10/22 1732 (!) 190/102 97.9 °F (36.6 °C) Oral 105 16 94 % 218 lb (98.9 kg)       Oxygen Saturation Interpretation: Normal    ------------------------------------------ PROGRESS NOTES ------------------------------------------  See ED course for reevaluation    Counseling:  I have spoken with the patient and discussed todays results, in addition to providing specific details for the plan of care and counseling regarding the diagnosis and prognosis.   Their questions are answered at this time and they are agreeable with the plan of admission.    --------------------------------- ADDITIONAL PROVIDER NOTES ---------------------------------  Consultations:   Spoke with Niki. Discussed case. They will admit the patient. This patient's ED course included: a personal history and physicial examination, re-evaluation prior to disposition, multiple bedside re-evaluations, IV medications, cardiac monitoring and continuous pulse oximetry    This patient has remained hemodynamically stable during their ED course. Diagnosis:  1.  Chest pain, unspecified type        Disposition:  Patient's disposition: Admit to telemetry  Patient's condition is Stable         Akilah Alvarez MD  Resident  05/11/22 1236

## 2022-05-10 NOTE — ED NOTES
Pt arrived via EMS on RA, pulse ox at 93%. While being evaluated by Dr. Melissa Villatoro patient stated that she wears 2L NC all the time, pt placed on 2L NC.       Janice Morales RN  05/10/22 5353

## 2022-05-11 ENCOUNTER — APPOINTMENT (OUTPATIENT)
Dept: NON INVASIVE DIAGNOSTICS | Age: 82
End: 2022-05-11
Payer: MEDICARE

## 2022-05-11 ENCOUNTER — APPOINTMENT (OUTPATIENT)
Dept: NUCLEAR MEDICINE | Age: 82
End: 2022-05-11
Payer: MEDICARE

## 2022-05-11 LAB
ANION GAP SERPL CALCULATED.3IONS-SCNC: 8 MMOL/L (ref 7–16)
BASOPHILS ABSOLUTE: 0.04 E9/L (ref 0–0.2)
BASOPHILS RELATIVE PERCENT: 0.4 % (ref 0–2)
BUN BLDV-MCNC: 18 MG/DL (ref 6–23)
CALCIUM SERPL-MCNC: 8.5 MG/DL (ref 8.6–10.2)
CHLORIDE BLD-SCNC: 103 MMOL/L (ref 98–107)
CHOLESTEROL, FASTING: 166 MG/DL (ref 0–199)
CO2: 28 MMOL/L (ref 22–29)
CREAT SERPL-MCNC: 1.1 MG/DL (ref 0.5–1)
EKG ATRIAL RATE: 103 BPM
EKG P AXIS: 46 DEGREES
EKG P-R INTERVAL: 118 MS
EKG Q-T INTERVAL: 360 MS
EKG QRS DURATION: 66 MS
EKG QTC CALCULATION (BAZETT): 471 MS
EKG R AXIS: 48 DEGREES
EKG T AXIS: 69 DEGREES
EKG VENTRICULAR RATE: 103 BPM
EOSINOPHILS ABSOLUTE: 0.1 E9/L (ref 0.05–0.5)
EOSINOPHILS RELATIVE PERCENT: 1 % (ref 0–6)
GFR AFRICAN AMERICAN: 58
GFR NON-AFRICAN AMERICAN: 48 ML/MIN/1.73
GLUCOSE BLD-MCNC: 169 MG/DL (ref 74–99)
HBA1C MFR BLD: 5.9 % (ref 4–5.6)
HCT VFR BLD CALC: 34.7 % (ref 34–48)
HDLC SERPL-MCNC: 53 MG/DL
HEMOGLOBIN: 10.7 G/DL (ref 11.5–15.5)
IMMATURE GRANULOCYTES #: 0.03 E9/L
IMMATURE GRANULOCYTES %: 0.3 % (ref 0–5)
INR BLD: 2.7
LDL CHOLESTEROL CALCULATED: 92 MG/DL (ref 0–99)
LV EF: 64 %
LVEF MODALITY: NORMAL
LYMPHOCYTES ABSOLUTE: 2.44 E9/L (ref 1.5–4)
LYMPHOCYTES RELATIVE PERCENT: 24.5 % (ref 20–42)
MAGNESIUM: 1.7 MG/DL (ref 1.6–2.6)
MCH RBC QN AUTO: 26.1 PG (ref 26–35)
MCHC RBC AUTO-ENTMCNC: 30.8 % (ref 32–34.5)
MCV RBC AUTO: 84.6 FL (ref 80–99.9)
MONOCYTES ABSOLUTE: 0.84 E9/L (ref 0.1–0.95)
MONOCYTES RELATIVE PERCENT: 8.4 % (ref 2–12)
NEUTROPHILS ABSOLUTE: 6.52 E9/L (ref 1.8–7.3)
NEUTROPHILS RELATIVE PERCENT: 65.4 % (ref 43–80)
PDW BLD-RTO: 15.4 FL (ref 11.5–15)
PLATELET # BLD: 397 E9/L (ref 130–450)
PMV BLD AUTO: 10.1 FL (ref 7–12)
POTASSIUM REFLEX MAGNESIUM: 3.5 MMOL/L (ref 3.5–5)
PROTHROMBIN TIME: 29.3 SEC (ref 9.3–12.4)
RBC # BLD: 4.1 E12/L (ref 3.5–5.5)
SODIUM BLD-SCNC: 139 MMOL/L (ref 132–146)
TRIGLYCERIDE, FASTING: 104 MG/DL (ref 0–149)
TROPONIN, HIGH SENSITIVITY: 26 NG/L (ref 0–9)
TROPONIN, HIGH SENSITIVITY: 30 NG/L (ref 0–9)
VLDLC SERPL CALC-MCNC: 21 MG/DL
WBC # BLD: 10 E9/L (ref 4.5–11.5)

## 2022-05-11 PROCEDURE — 97165 OT EVAL LOW COMPLEX 30 MIN: CPT

## 2022-05-11 PROCEDURE — 80048 BASIC METABOLIC PNL TOTAL CA: CPT

## 2022-05-11 PROCEDURE — 94660 CPAP INITIATION&MGMT: CPT

## 2022-05-11 PROCEDURE — 84484 ASSAY OF TROPONIN QUANT: CPT

## 2022-05-11 PROCEDURE — 36415 COLL VENOUS BLD VENIPUNCTURE: CPT

## 2022-05-11 PROCEDURE — 80061 LIPID PANEL: CPT

## 2022-05-11 PROCEDURE — G0378 HOSPITAL OBSERVATION PER HR: HCPCS

## 2022-05-11 PROCEDURE — 93010 ELECTROCARDIOGRAM REPORT: CPT | Performed by: INTERNAL MEDICINE

## 2022-05-11 PROCEDURE — 93018 CV STRESS TEST I&R ONLY: CPT | Performed by: INTERNAL MEDICINE

## 2022-05-11 PROCEDURE — A9500 TC99M SESTAMIBI: HCPCS | Performed by: RADIOLOGY

## 2022-05-11 PROCEDURE — 2580000003 HC RX 258: Performed by: NURSE PRACTITIONER

## 2022-05-11 PROCEDURE — 6360000002 HC RX W HCPCS: Performed by: INTERNAL MEDICINE

## 2022-05-11 PROCEDURE — 78452 HT MUSCLE IMAGE SPECT MULT: CPT

## 2022-05-11 PROCEDURE — 87040 BLOOD CULTURE FOR BACTERIA: CPT

## 2022-05-11 PROCEDURE — 93017 CV STRESS TEST TRACING ONLY: CPT

## 2022-05-11 PROCEDURE — 78452 HT MUSCLE IMAGE SPECT MULT: CPT | Performed by: INTERNAL MEDICINE

## 2022-05-11 PROCEDURE — 93016 CV STRESS TEST SUPVJ ONLY: CPT | Performed by: INTERNAL MEDICINE

## 2022-05-11 PROCEDURE — 2700000000 HC OXYGEN THERAPY PER DAY

## 2022-05-11 PROCEDURE — 6360000002 HC RX W HCPCS: Performed by: NURSE PRACTITIONER

## 2022-05-11 PROCEDURE — 97161 PT EVAL LOW COMPLEX 20 MIN: CPT

## 2022-05-11 PROCEDURE — 6370000000 HC RX 637 (ALT 250 FOR IP): Performed by: NURSE PRACTITIONER

## 2022-05-11 PROCEDURE — 85025 COMPLETE CBC W/AUTO DIFF WBC: CPT

## 2022-05-11 PROCEDURE — 83735 ASSAY OF MAGNESIUM: CPT

## 2022-05-11 PROCEDURE — 83036 HEMOGLOBIN GLYCOSYLATED A1C: CPT

## 2022-05-11 PROCEDURE — 3430000000 HC RX DIAGNOSTIC RADIOPHARMACEUTICAL: Performed by: RADIOLOGY

## 2022-05-11 PROCEDURE — 94640 AIRWAY INHALATION TREATMENT: CPT

## 2022-05-11 PROCEDURE — 85610 PROTHROMBIN TIME: CPT

## 2022-05-11 RX ORDER — WARFARIN SODIUM 10 MG/1
10 TABLET ORAL
Status: COMPLETED | OUTPATIENT
Start: 2022-05-11 | End: 2022-05-11

## 2022-05-11 RX ADMIN — IPRATROPIUM BROMIDE 0.5 MG: 0.5 SOLUTION RESPIRATORY (INHALATION) at 13:18

## 2022-05-11 RX ADMIN — PANTOPRAZOLE SODIUM 40 MG: 40 TABLET, DELAYED RELEASE ORAL at 09:27

## 2022-05-11 RX ADMIN — WARFARIN SODIUM 10 MG: 10 TABLET ORAL at 17:46

## 2022-05-11 RX ADMIN — SODIUM CHLORIDE, PRESERVATIVE FREE 10 ML: 5 INJECTION INTRAVENOUS at 19:58

## 2022-05-11 RX ADMIN — ARFORMOTEROL TARTRATE 15 MCG: 15 SOLUTION RESPIRATORY (INHALATION) at 09:34

## 2022-05-11 RX ADMIN — SODIUM CHLORIDE, PRESERVATIVE FREE 10 ML: 5 INJECTION INTRAVENOUS at 09:27

## 2022-05-11 RX ADMIN — LISINOPRIL 20 MG: 20 TABLET ORAL at 09:27

## 2022-05-11 RX ADMIN — ARFORMOTEROL TARTRATE 15 MCG: 15 SOLUTION RESPIRATORY (INHALATION) at 20:08

## 2022-05-11 RX ADMIN — PANTOPRAZOLE SODIUM 40 MG: 40 TABLET, DELAYED RELEASE ORAL at 19:58

## 2022-05-11 RX ADMIN — REGADENOSON 0.4 MG: 0.08 INJECTION, SOLUTION INTRAVENOUS at 11:35

## 2022-05-11 RX ADMIN — IPRATROPIUM BROMIDE 0.5 MG: 0.5 SOLUTION RESPIRATORY (INHALATION) at 20:08

## 2022-05-11 RX ADMIN — IPRATROPIUM BROMIDE 0.5 MG: 0.5 SOLUTION RESPIRATORY (INHALATION) at 16:01

## 2022-05-11 RX ADMIN — Medication 30 MILLICURIE: at 11:30

## 2022-05-11 RX ADMIN — IPRATROPIUM BROMIDE 0.5 MG: 0.5 SOLUTION RESPIRATORY (INHALATION) at 09:34

## 2022-05-11 RX ADMIN — LISINOPRIL 20 MG: 20 TABLET ORAL at 17:46

## 2022-05-11 RX ADMIN — AMLODIPINE BESYLATE 2.5 MG: 2.5 TABLET ORAL at 09:27

## 2022-05-11 RX ADMIN — Medication 10 MILLICURIE: at 10:05

## 2022-05-11 ASSESSMENT — PAIN DESCRIPTION - PAIN TYPE: TYPE: ACUTE PAIN

## 2022-05-11 ASSESSMENT — PAIN DESCRIPTION - ORIENTATION: ORIENTATION: RIGHT

## 2022-05-11 ASSESSMENT — PAIN DESCRIPTION - FREQUENCY: FREQUENCY: INTERMITTENT

## 2022-05-11 ASSESSMENT — PAIN - FUNCTIONAL ASSESSMENT: PAIN_FUNCTIONAL_ASSESSMENT: ACTIVITIES ARE NOT PREVENTED

## 2022-05-11 ASSESSMENT — PAIN DESCRIPTION - DESCRIPTORS: DESCRIPTORS: SHARP

## 2022-05-11 ASSESSMENT — PAIN DESCRIPTION - LOCATION: LOCATION: OTHER (COMMENT)

## 2022-05-11 ASSESSMENT — PAIN SCALES - GENERAL: PAINLEVEL_OUTOF10: 8

## 2022-05-11 NOTE — H&P
[facility]  History and Physical      CHIEF COMPLAINT:    Chief Complaint   Patient presents with    Rib Pain     right and left rib pain, greater on the right. Denies any injury. Patient of Celena Hernandes MD presents with:  Chest pain    History of Present Illness:   Patient is an 60-year-old female with a past medical history of COPD, DVT on [address] [place] [address] Road, HTN, FAISAL and GERD who presents to the emergency room yesterday for rib pain patient states that she woke up yesterday morning with right-sided rib pain. She denies any falls or any recent illnesses with extensive coughing. She chronically wears 2 L of oxygen at home and is currently wearing that now. Patient is CTA pulmonary which did not identify anything acute. Patient did have a slight elevation in her troponin 22-30-26, although she denies any shortness of breath or chest pain. Upon assessment patient still complains of right-sided rib pain and continues to deny any SOB or chest pain. She also denies any urinary complaints including frequency, burning, or hematuria. Patient is admitted to intermediate telemetry unit for further work-up and treatment. REVIEW OF SYSTEMS:  Pertinent negatives are above in HPI. 10 point ROS otherwise negative.       Past Medical History:   Diagnosis Date    [facility]chronic obstructive pulmonary disease) ([facility]     DVT (deep venous thrombosis) (HCC)     Hypertension     Pulmonary embolism (HCC)          Past Surgical History:   Procedure Laterality Date    CARPAL TUNNEL RELEASE Right     CHOLECYSTECTOMY      HYSTERECTOMY      partial    MASTECTOMY, BILATERAL  1988       Medications Prior to Admission:    Medications Prior to Admission: Magic Mouthwash (MIRACLE MOUTHWASH), Swish and spit 5 mLs 4 times daily as needed for Irritation  ferrous sulfate (IRON 325) 325 (65 Fe) MG tablet, Take 1 tablet by mouth daily (with breakfast)  warfarin (COUMADIN) 10 MG tablet, Take 1 tablet by mouth Twice a Week Wed and Saturday @ 2100. warfarin (COUMADIN) 7.5 MG tablet, Take 7.5 mg by mouth Five times weekly Szx-Btv-Qgzn-Fri-Sun @@ 2100  amLODIPine (NORVASC) 2.5 MG tablet, Take 1 tablet by mouth daily  guaiFENesin 400 MG tablet, Take 1 tablet by mouth 4 times daily as needed for Cough (Patient not taking: Reported on 4/15/2021)  ipratropium-albuterol (DUONEB) 0.5-2.5 (3) MG/3ML SOLN nebulizer solution, Inhale 3 mLs into the lungs every 6 hours as needed for Shortness of Breath  Respiratory Therapy Supplies (FULL KIT NEBULIZER SET) MISC, Use as directed with nebulized medication. lisinopril (PRINIVIL;ZESTRIL) 20 MG tablet, Take 20 mg by mouth 2 times daily   pantoprazole (PROTONIX) 40 MG tablet, Take 40 mg by mouth 2 times daily  umeclidinium-vilanterol (ANORO ELLIPTA) 62.5-25 MCG/INH AEPB inhaler, Inhale 1 puff into the lungs daily    Note that the patient's home medications were reviewed and the above list is accurate to the best of my knowledge at the time of the exam.    Allergies:    Carafate [sucralfate]    Social History:    reports that she has been smoking cigarettes. She started smoking about 69 years ago. She has a 16.25 pack-year smoking history. She has never used smokeless tobacco. She reports previous alcohol use. She reports that she does not use drugs. Family History:   family history includes Heart Attack in her mother; Other in her brother. PHYSICAL EXAM:    Vitals:  BP (!) 152/69   Pulse 84   Temp 98.2 °F (36.8 °C) (Oral)   Resp 16   Wt 223 lb 8 oz (101.4 kg)   SpO2 100%   BMI 37.19 kg/m²       General appearance: NAD, conversant, obese  Eyes: Sclerae anicteric, PERRLA  HEENT: AT/NC, MMM  Neck: FROM, supple, no thyromegaly  Lymph: No cervical / supraclavicular lymphadenopathy  Lungs: diminished in the lung bases, WOB normal, chronic 2L NC  CV: RRR, no MRGs, no lower extremity edema  Abdomen: Soft, non-tender; no masses or HSM, +BS  Extremities: FROM without synovitis.   No clubbing or cyanosis of the hands. Skin: no rash, induration, lesions, or ulcers  Psych: Calm and cooperative. Normal judgement and insight. Normal mood and affect. Neuro: Alert and interactive, face symmetric, speech fluent. LABS:  All labs reviewed. Of note:  CBC:   Lab Results   Component Value Date    WBC 10.0 05/11/2022    RBC 4.10 05/11/2022    HGB 10.7 05/11/2022    HCT 34.7 05/11/2022    MCV 84.6 05/11/2022    MCH 26.1 05/11/2022    MCHC 30.8 05/11/2022    RDW 15.4 05/11/2022     05/11/2022    MPV 10.1 05/11/2022     CMP:    Lab Results   Component Value Date     05/11/2022    K 3.5 05/11/2022     05/11/2022    CO2 28 05/11/2022    BUN 18 05/11/2022    CREATININE 1.1 05/11/2022    GFRAA 58 05/11/2022    LABGLOM 48 05/11/2022    GLUCOSE 169 05/11/2022    PROT 7.7 05/10/2022    LABALBU 4.2 05/10/2022    CALCIUM 8.5 05/11/2022    BILITOT 0.3 05/10/2022    ALKPHOS 109 05/10/2022    AST 15 05/10/2022    ALT 8 05/10/2022       Imaging:  CTA pulmonary: Chronic areas of fibrosis in both lungs with presence of emphysematous changes in the upper lobes. EKG:  Sinus tachycardia with nonspecific ST abnormality    Telemetry:  Normal sinus rhythm    ASSESSMENT/PLAN:  Principal Problem:    Chest pain  Active Problems:    Emphysema lung (HCC)    FAISAL (obstructive sleep apnea)    Leukocytosis    HTN (hypertension)    Tobacco abuse  Resolved Problems:    * No resolved hospital problems. *    Patient is a 22-year-old female admitted to [facility] for  Chest pain- right sided-likely musculoskeletal  - r/o cardiac   -Monitor labs  -Serial troponins 22-22-30-26  -NM stress test > pending    Leukocytosis  -Monitor labs  -WBC 13.1 > 10.0  -UA negative  -Denies any fevers or chills    Hypertension  -Resume home medications    FAISAL  -Continue with cpap at HS     Medication for other comorbidities continue as appropriate dose adjustment as necessary.     DVT prophylaxis warfarin  PT OT  Discharge planning  Case discussed with attending and agreed upon plan of care. Code status: Full  Requires Inpatient level of care  YOSEPH Lala CNP    1:56 PM  5/11/2022     Above note edited to reflect my thoughts   CTA chest done in ER is also unremarkable   more of right-sided chest pain on lateral aspect then chest heaviness or shortness of breath  Stress test is overall low risk perfusion study with ejection of 60%  Given negative studies of both cardiac and pulmonary etiologies, patient can be discharged in stable condition from medicine standpoint    I personally saw, examined and provided care for the patient. Radiographs, labs and medication list were reviewed by me independently. The case was discussed in detail and plans for care were established. Review of LUCIO Lala   , documentation was conducted and revisions were made as appropriate directly by me. I agree with the above documented exam, problem list, and plan of care.      Natalie Bland MD  6:21 PM  5/11/2022

## 2022-05-11 NOTE — CARE COORDINATION
5/11/2022  Social Work Discharge Planning: This worker met with Pt to discuss  role and transition of care/discharge planning. Pt resides with her daughter Angel Loo and uses a ww. Pt also has a nebulizer and bipap, and uses 2l home o2 via [facility]. Pt prefers [facility] if HHC is needed at discharge. Awaiting therapy evals. [facility] and PCP is Dr. Trey Philip. Electronically signed by AGUSTÍN Amaya on 5/11/2022 at 2:33 PM

## 2022-05-11 NOTE — PROGRESS NOTES
Occupational Therapy  OCCUPATIONAL THERAPY INITIAL EVALUATION     Radhika Au [facility], [place] SKMR:                                                  Patient Name: Vickie Hernandes    MRN: [id number]    : 1940    Room: [address] [place] [address]      Evaluating OT: Blue Mayorga, OTR/L [id number]      Referring Provider: YOSEPH Rivers CNP    Specific Provider Orders/Date: OT eval and treat 5/10/22      Diagnosis:  Chest pain [R07.9]  Chest pain, unspecified type [R07.9]      Pertinent Medical History: COPD, DVT, HTN       Precautions:  Fall Risk, O2     Assessment of current deficits    [x] Functional mobility  [x]ADLs  [x] Strength               []Cognition    [x] Functional transfers   [x] IADLs         [x] Safety Awareness   [x]Endurance    [] Fine Coordination              [x] Balance      [] Vision/perception   []Sensation     []Gross Motor Coordination  [] ROM  [] Delirium                   [] Motor Control     OT PLAN OF CARE   OT POC based on physician orders, patient diagnosis and results of clinical assessment    Frequency/Duration 2-4 days/wk for 2 weeks PRN   Specific OT Treatment Interventions to include:   * Instruction/training on adapted ADL techniques and AE recommendations to increase functional independence within precautions       * Training on energy conservation strategies, correct breathing pattern and techniques to improve independence/tolerance for self-care routine  * Functional transfer/mobility training/DME recommendations for increased independence, safety, and fall prevention  * Patient/Family education to increase follow through with safety techniques and functional independence  * Recommendation of environmental modifications for increased safety with functional transfers/mobility and ADLs  * Therapeutic exercise to improve motor endurance, ROM, and functional strength for ADLs/functional transfers  * Therapeutic activities to facilitate/challenge dynamic balance, stand tolerance for increased safety and independence with ADLs    Recommended Adaptive Equipment:  TBD     Home Living: Pt lives with daughter in 2 story house with 2 RAJIV and 1 hand rail . Pt's bedroom and bathroom are on the 1st floor. Bathroom setup: tub/shower with grab bar and extended tub bench   Equipment owned: O2 2L, wheeled walker, BSC, hospital bed    Prior Level of Function: assist with daughter with ADLs , assist with daughter with IADLs; functional mobility: wheeled walker short distances (from bed to bathroom mostly) pt reports she stays in bed most of the time  Driving: no    Pain Level: pt reported pain in R side but did not rate; pt agreeable to therapy  Cognition: A&O: pt alert and oriented grossly; [facility] command follow demonstrated. Memory:  Fair    Sequencing:  Fair    Problem solving:  Fair    Judgement/safety:  Fair      Functional Assessment:  AM-PAC Daily Activity Raw Score: 13/24   Initial Eval Status  Date: 5/11/22 Treatment Status  Date: STGs = LTGs  Time frame: 10-14 days   Feeding Set up     Grooming Mod A/Min A, seated  Sup   UB Dressing Mod A  SBA   LB Dressing Max A   To don socks, seated EOB   Min A-with use of AD as appropriate/needed   Bathing Max A  Min A -with use of AD as appropriate/needed   Toileting Max A  Min A    Bed Mobility  Supine to sit: Min A   Sit to supine:  Mod A   Sup   Functional Transfers Min A with wheeled walker  Sit<>stand from EOB  Cues for hand placement and safe technique  Sup   Functional Mobility Min A with wheeled walker  A few side steps toward [address] for safe wheeled walker management and navigation  SBA -with device as needed to maximize independence with ADLs and functional task completion   Balance Sitting:     Static:  Sup    Dynamic:SBA  Standing: Min A with wheeled walker  Sup/I for static/dynamic sitting balance to maximize independence with ADLs and functional task completion    Sup for standing balance to maximize independence with ADLs and functional task completion   Activity Tolerance Fair- with light activity. Pt limited by weakness, fatigue, and pain. Fair+ with ADL activity. Pt will demonstrate good understanding of education provided on EC/WS techniques    Visual/  Perceptual Glasses: none at bedside. Pt reports that she is legally blind                Additional long-term goal: Pt will increase functional independence to PLOF to allow pt to live in least restrictive environment. Hand Dominance R   AROM (PROM) Strength   RUE  WFL WFL   LUE WFL WFL     Hearing: WFL   Sensation:  No c/o numbness or tingling   Tone: WFL   Edema: non noted    Comments: Upon arrival patient lying in bed. At end of session, patient returned to bed with call light and phone within reach, all lines and tubes intact. Overall patient demonstrated decreased independence and safety during completion of ADL/functional transfer/mobility tasks. Pt would benefit from continued skilled OT to increase safety and independence with completion of ADL/IADL tasks for functional independence and quality of life. Rehab Potential: Good for established goals     Patient / Family Goal: none stated    Patient and/or family were instructed on functional diagnosis, prognosis/goals and OT plan of care. Demonstrated fair understanding.      Eval Complexity: Low    Time In: 1350  Time Out: 1404    Min Units   OT Eval Low 97165  x  1   OT Eval Medium 74386      OT Eval High 22563      OT Re-Eval [id number]       Therapeutic Ex [phone]       Therapeutic Activities 57910       ADL/Self Care 00358       Orthotic Management 12454       Manual [id number]     Neuro Re-Ed 84087       Non-Billable Time          Evaluation Time additionally includes thorough review of current medical information, gathering information on past medical history/social history and prior level of function, interpretation of standardized testing/informal observation of tasks, assessment of data and development of plan of care and goals.             Josselyn Ignacio, OTR/L, [id number]

## 2022-05-11 NOTE — PROCEDURES
Following placement of an intravenous catheter 10 millicuries of technetium 99m sestamibi was administered followed by a saline flush. Approximately 45 minutes later resting SPECT images were obtained. After completion of resting images a regadenoson stress test was performed via a bolus injection of 0.4 milligrams of regadenason followed by a saline flush. Following regadenoson administration 30 millicuries of technetium 99m sestamibi was injected followed by repeat post stress SPECT imaging approximately 60 minutes post completion of administration. Baseline tracing demonstrates sinus rhythm with nonspecific ST changes. The patient experienced no anginal symptoms and remained hemodynamically stable during administration and no electrocardiographic changes were noted. Perfusion images pending.

## 2022-05-11 NOTE — PROGRESS NOTES
Pharmacy Consultation Note  (Warfarin Dosing and Monitoring)  Initial consult date: 5/10/2022  Consulting Provider: YOSEPH Rivers-SALBADOR    Mildred Flood is a 80 y.o. female for whom pharmacy has been asked to manage warfarin therapy. Weight:   Wt Readings from Last 1 Encounters:   05/11/22 223 lb 8 oz (101.4 kg)       TSH:    Lab Results   Component Value Date    TSH 0.470 10/26/2021       Hepatic Function Panel:                            Lab Results   Component Value Date    ALKPHOS 109 05/10/2022    ALT 8 05/10/2022    AST 15 05/10/2022    PROT 7.7 05/10/2022    BILITOT 0.3 05/10/2022    LABALBU 4.2 05/10/2022       Current warfarin drug-drug interactions include: No significant interactions    Recent Labs     05/10/22  1836 05/11/22  0215   HGB 12.2 10.7*   * 397     Date Warfarin Dose INR Heparin or LMWH Comment   5/11 10 mg 2.7 ----------------                                  Assessment:  · Patient is a 80 y.o. female on warfarin for History of  VTE/PE. Patient's home warfarin dosing regimen is documented as warfarin 10 mg on Wednesday and Saturday and warfarin 7.5 mg all other days.    · Goal INR 2 - 3  · INR 2.7 today    Plan:  · Will continue home regimen - warfarin 10 mg tonight  · Daily PT/INR until the INR is stable within the therapeutic range  · Pharmacist will follow and monitor/adjust dosing as necessary    Benson Flores PharmD, [facility]  5/11/2022  11:01 AM

## 2022-05-11 NOTE — PROGRESS NOTES
Physical Therapy  Facility/Department: [address] INTERNAL MEDICINE 2  Physical Therapy Initial Assessment    Name: Candance Prey  : 1940  MRN: [id number]  Date of Service: 2022    Patient Diagnosis(es): The encounter diagnosis was Chest pain, unspecified type. Past Medical History:  has a past medical history of COPD (chronic obstructive pulmonary disease) ([facility] [address] DVT (deep venous thrombosis) ([facility] [address] Hypertension, and Pulmonary embolism ([facility] [address] Past Surgical History:  has a past surgical history that includes Cholecystectomy; Mastectomy, bilateral (); Carpal tunnel release (Right); and Hysterectomy. Referring provider:  Cynthia Butler MD    PT Order:  PT eval and treat     Evaluating PT:  Jena Pringle PT, DPT PT [id number]    Room #:  6984/8114-X  Diagnosis:  Chest pain [R07.9]  Chest pain, unspecified type [R07.9]  Precautions:  Fall risk, O2, legally blind  Equipment Needs:  None. Pt reported owning a walker. SUBJECTIVE:    Pt lives with her daughter in a 2 story home with 2 stairs to enter and 1 rail. Bed and bath is on first floor. Pt ambulated with walker PTA. Pt reported she  Mainly only transfers from bed to [facility] and back but on occasion with ambulation a short distance to the restroom to shower. OBJECTIVE:   Initial Evaluation  Date:  Treatment Short Term/ Long Term   Goals   Was pt agreeable to Eval/treatment? yes     Does pt have pain? Right sided rib pain     Bed Mobility  Rolling: NT  Supine to sit: Min A  Sit to supine:  Mod A  Scooting: Min A to sitting EOB  SBA   Transfers Sit to stand: Min A  Stand to sit: Min A  Stand pivot: NT  SBA   Ambulation    2 side steps toward HOB with w/w Min A.    25+ feet with w/w SBA    Stair negotiation: ascended and descended  NT      ROM BLE:  WFL     Strength BLE:  Grossly 4-/5  Grossly 4+/5   Balance Sitting EOB:  supervision  Dynamic Standing:  Min A with w/w  Sitting EOB:  independent  Dynamic Standing:  SBA with w/w   AM-PAC 6 Clicks 16/24       Pt is A & O x 3  Sensation:  Pt denies numbness and tingling to extremities    Patient education  Pt educated on PT objectives during eval and while in the hospital, hand placement during transfers. Patient response to education:   Pt verbalized understanding Pt demonstrated skill Pt requires further education in this area   yes With cueing yes     ASSESSMENT:    Conditions Requiring Skilled Therapeutic Intervention:    [x]Decreased strength     []Decreased ROM  [x]Decreased functional mobility  [x]Decreased balance   [x]Decreased endurance   [x]Decreased posture  []Decreased sensation  []Decreased coordination   []Decreased vision  []Decreased safety awareness   [x]Increased pain       Comments:  Pt found in bed. No report of dizziness during functional mobility. Cueing required for hand placement during transfers. Pt with increased right elaine pain with mobility. At end of eval, pt left in bed with call light in reach and bed alarm on.      Pt's/ family goals   1. None stated    Prognosis is good for reaching above PT goals. Patient and or family understand(s) diagnosis, prognosis, and plan of care.   yes    PHYSICAL THERAPY PLAN OF CARE:    PT POC is established based on physician order and patient diagnosis     Diagnosis:  Chest pain [R07.9]  Chest pain, unspecified type [R07.9]    Current Treatment Recommendations:     [x] Strengthening to improve independence with functional mobility   [] ROM to improve independence with functional mobility   [x] Balance Training to improve static/dynamic balance and to reduce fall risk  [x] Endurance Training to improve activity tolerance during functional mobility   [x] Transfer Training to improve safety and independence with all functional transfers   [] Gait Training to improve gait mechanics, endurance and assess need for appropriate assistive device  [] Stair Training in preparation for safe discharge home and/or into the community   [] Positioning to prevent skin breakdown and contractures  [x] Safety and Education Training   [x] Patient/Caregiver Education   [] HEP  [] Other     PT long term treatment goals are located in above grid    Frequency of treatments: 2-5x/week x 1-2 weeks. Time in  1353  Time out  1404    Evaluation Time includes thorough review of current medical information, gathering information on past medical history/social history and prior level of function, completion of standardized testing/informal observation of tasks, assessment of data and education on plan of care and goals.     CPT codes:  [x] Low Complexity PT evaluation 61585  [] Moderate Complexity PT evaluation 00458  [] High Complexity PT evaluation 60039  [] PT Re-evaluation 79814  [] Therapeutic activities 01180 __minutes  [] Therapeutic exercises 58154 __ minutes      Rj Guerra, PT, DPT  PT [id number]

## 2022-05-12 ENCOUNTER — APPOINTMENT (OUTPATIENT)
Dept: GENERAL RADIOLOGY | Age: 82
End: 2022-05-12
Payer: MEDICARE

## 2022-05-12 VITALS
BODY MASS INDEX: 37.19 KG/M2 | WEIGHT: 223.5 LBS | TEMPERATURE: 97.9 F | OXYGEN SATURATION: 100 % | HEART RATE: 92 BPM | DIASTOLIC BLOOD PRESSURE: 72 MMHG | RESPIRATION RATE: 18 BRPM | SYSTOLIC BLOOD PRESSURE: 129 MMHG

## 2022-05-12 LAB
INR BLD: 2.5
PROTHROMBIN TIME: 27.2 SEC (ref 9.3–12.4)

## 2022-05-12 PROCEDURE — 94640 AIRWAY INHALATION TREATMENT: CPT

## 2022-05-12 PROCEDURE — 2700000000 HC OXYGEN THERAPY PER DAY

## 2022-05-12 PROCEDURE — 2580000003 HC RX 258: Performed by: NURSE PRACTITIONER

## 2022-05-12 PROCEDURE — 85610 PROTHROMBIN TIME: CPT

## 2022-05-12 PROCEDURE — 36415 COLL VENOUS BLD VENIPUNCTURE: CPT

## 2022-05-12 PROCEDURE — 94660 CPAP INITIATION&MGMT: CPT

## 2022-05-12 PROCEDURE — 71100 X-RAY EXAM RIBS UNI 2 VIEWS: CPT

## 2022-05-12 PROCEDURE — 6370000000 HC RX 637 (ALT 250 FOR IP): Performed by: NURSE PRACTITIONER

## 2022-05-12 PROCEDURE — G0378 HOSPITAL OBSERVATION PER HR: HCPCS

## 2022-05-12 PROCEDURE — 6360000002 HC RX W HCPCS: Performed by: NURSE PRACTITIONER

## 2022-05-12 RX ORDER — LIDOCAINE 4 G/G
1 PATCH TOPICAL DAILY
Qty: 2 PATCH | Refills: 5 | Status: SHIPPED | OUTPATIENT
Start: 2022-05-13

## 2022-05-12 RX ORDER — WARFARIN SODIUM 7.5 MG/1
7.5 TABLET ORAL
Status: COMPLETED | OUTPATIENT
Start: 2022-05-12 | End: 2022-05-12

## 2022-05-12 RX ORDER — LIDOCAINE 4 G/G
1 PATCH TOPICAL DAILY
Status: DISCONTINUED | OUTPATIENT
Start: 2022-05-12 | End: 2022-05-12 | Stop reason: HOSPADM

## 2022-05-12 RX ADMIN — WARFARIN SODIUM 7.5 MG: 7.5 TABLET ORAL at 17:44

## 2022-05-12 RX ADMIN — PANTOPRAZOLE SODIUM 40 MG: 40 TABLET, DELAYED RELEASE ORAL at 08:35

## 2022-05-12 RX ADMIN — AMLODIPINE BESYLATE 2.5 MG: 2.5 TABLET ORAL at 08:35

## 2022-05-12 RX ADMIN — SODIUM CHLORIDE, PRESERVATIVE FREE 10 ML: 5 INJECTION INTRAVENOUS at 08:35

## 2022-05-12 RX ADMIN — LISINOPRIL 20 MG: 20 TABLET ORAL at 08:35

## 2022-05-12 RX ADMIN — FERROUS SULFATE TAB 325 MG (65 MG ELEMENTAL FE) 325 MG: 325 (65 FE) TAB at 08:35

## 2022-05-12 RX ADMIN — BISACODYL 5 MG: 5 TABLET, COATED ORAL at 08:40

## 2022-05-12 RX ADMIN — IPRATROPIUM BROMIDE 0.5 MG: 0.5 SOLUTION RESPIRATORY (INHALATION) at 08:55

## 2022-05-12 RX ADMIN — LISINOPRIL 20 MG: 20 TABLET ORAL at 17:44

## 2022-05-12 RX ADMIN — IPRATROPIUM BROMIDE 0.5 MG: 0.5 SOLUTION RESPIRATORY (INHALATION) at 12:51

## 2022-05-12 RX ADMIN — ARFORMOTEROL TARTRATE 15 MCG: 15 SOLUTION RESPIRATORY (INHALATION) at 08:55

## 2022-05-12 RX ADMIN — IPRATROPIUM BROMIDE 0.5 MG: 0.5 SOLUTION RESPIRATORY (INHALATION) at 15:25

## 2022-05-12 ASSESSMENT — PAIN SCALES - GENERAL: PAINLEVEL_OUTOF10: 8

## 2022-05-12 ASSESSMENT — PAIN DESCRIPTION - ORIENTATION: ORIENTATION: RIGHT

## 2022-05-12 ASSESSMENT — PAIN DESCRIPTION - LOCATION: LOCATION: ABDOMEN

## 2022-05-12 ASSESSMENT — PAIN DESCRIPTION - DESCRIPTORS: DESCRIPTORS: SHARP;SHOOTING

## 2022-05-12 ASSESSMENT — PAIN DESCRIPTION - FREQUENCY: FREQUENCY: OTHER (COMMENT)

## 2022-05-12 NOTE — PROGRESS NOTES
Called daughter again and was able to reach her, she was informed of pt being discharged, she states she would pick pt up at front of hospital, I briefly reviewed dc instructions with daughter, she is aware to  rx at [facility] and Chip Bowman 71 will be coming to home

## 2022-05-12 NOTE — PROGRESS NOTES
[facility]                                Progress note    Subjective: The patient is awake and alert. Still complaining of R rib cage pain  Obtain xr R ribs, apply lidocaine patch      Objective:    BP (!) 152/85   Pulse 96   Temp 98.1 °F (36.7 °C) (Axillary)   Resp 18   Wt 223 lb 8 oz (101.4 kg)   SpO2 100%   BMI 37.19 kg/m²     In: -   Out: 550   In: -   Out: 550 [Urine:550]    General appearance: NAD, conversant  HEENT: AT/NC, MMM  Neck: FROM, supple  Lungs: Clear to auscultation, R rib cage pain  CV: RRR, no MRGs  Vasc: Radial pulses 2+  Abdomen: Soft, non-tender; no masses or HSM  Extremities: No peripheral edema or digital cyanosis  Skin: no rash, lesions or ulcers  Psych: Alert and oriented to person, place and time  Neuro: Alert and interactive     Recent Labs     05/10/22  1836 05/11/22  0215   WBC 13.1* 10.0   HGB 12.2 10.7*   HCT 39.2 34.7   * 397       Recent Labs     05/10/22  1836 05/11/22  0215    139   K 4.0 3.5   CL 97* 103   CO2 31* 28   BUN 20 18   CREATININE 1.1* 1.1*   CALCIUM 9.5 8.5*       Assessment:    Principal Problem:    Chest pain  Active Problems:    Emphysema lung (HCC)    FAISAL (obstructive sleep apnea)    Leukocytosis    HTN (hypertension)    Tobacco abuse  Resolved Problems:    * No resolved hospital problems.  *      Plan:    Patient is a 80-year-old female admitted to [facility] for  Chest pain- right sided-likely musculoskeletal  -r/o cardiac   -Monitor labs  -Serial troponins 22-22-30-26  -NM stress test > negative  -XR right ribs: if negative patient can discharge      Leukocytosis  -Monitor labs  -WBC 13.1 > 10.0  -UA negative  -Denies any fevers or chills     Hypertension  -Resume home medications     FAISAL  -Continue with cpap at [facility]    DVT Prophylaxis warfarin   PT/OT  Discharge planning if xr rib cage negative patient can discharge to [address], APRN - CNP  1:57 PM  5/12/2022    Above note edited to reflect my thoughts I personally saw, examined and provided care for the patient. Radiographs, labs and medication list were reviewed by me independently. The case was discussed in detail and plans for care were established. Review of LUCIO Avelar   , documentation was conducted and revisions were made as appropriate directly by me. I agree with the above documented exam, problem list, and plan of care.      Geraldo Lemos MD  5/12/2020

## 2022-05-12 NOTE — PROGRESS NOTES
Pharmacy Consultation Note  (Warfarin Dosing and Monitoring)  Initial consult date: 5/10/2022  Consulting Provider: LUCIO Rivers    Marisa Chen is a 80 y.o. female for whom pharmacy has been asked to manage warfarin therapy. Weight:   Wt Readings from Last 1 Encounters:   05/11/22 223 lb 8 oz (101.4 kg)       TSH:    Lab Results   Component Value Date    TSH 0.470 10/26/2021       Hepatic Function Panel:                            Lab Results   Component Value Date    ALKPHOS 109 05/10/2022    ALT 8 05/10/2022    AST 15 05/10/2022    PROT 7.7 05/10/2022    BILITOT 0.3 05/10/2022    LABALBU 4.2 05/10/2022       Current warfarin drug-drug interactions include: No significant interactions    Recent Labs     05/10/22  1836 05/11/22  0215   HGB 12.2 10.7*   * 397     Date Warfarin Dose INR Heparin or LMWH Comment   5/11 10 mg 2.7 ----------------    5/12 7.5 mg 2.5 ----------------                           Assessment:  · Patient is a 80 y.o. female on warfarin for History of  VTE/PE. Patient's home warfarin dosing regimen is documented as warfarin 10 mg on Wednesday and Saturday and warfarin 7.5 mg all other days.    · Goal INR 2 - 3  · INR 2.5 today    Plan:  · Will continue home regimen - warfarin 7.5 mg tonight  · Daily PT/INR until the INR is stable within the therapeutic range  · Pharmacist will follow and monitor/adjust dosing as necessary    Wild Penny PharmD, BCCCP  5/12/2022  9:12 AM

## 2022-05-12 NOTE — CARE COORDINATION
OBS LOC. PT am-pac 16. Discharge planning discussed with patient. [facility] lists given to review. Requesting I speak with her daughter Regino Akhtar [phone] left- awaiting return call. Requiring O2 2lNC which is pt's home O2 baseline provided by [facility] [place]- has home nebulizer and Astral NIV. Will follow Maykel Bundy RN case manager    [phone]: Received return call from daughter Regino Akhtar. Discharge planning discussed. Regino Akhtar is declining [facility] at this time- requesting Declan Ravi as pt has had recently - referral made- awaiting acceptance- [facility] order on chart.  Will follow Maykel Bundy RN case manager

## 2022-05-13 LAB — URINE CULTURE, ROUTINE: NORMAL

## 2022-05-16 LAB
BLOOD CULTURE, ROUTINE: NORMAL
CULTURE, BLOOD 2: NORMAL

## 2022-05-25 NOTE — DISCHARGE SUMMARY
[facility]   Discharge summary   Patient ID:  Kristen Allen  [id number]  71 y.o.  1940    Admit date: 5/10/2022    Discharge date and time: 5/12/2022    Admission Diagnoses:   Patient Active Problem List   Diagnosis    Pneumonia due to organism    Chronic respiratory failure with hypoxia (HCC)    COPD exacerbation (HCC)    Supratherapeutic INR    FAISAL (obstructive sleep apnea)    Leukocytosis    Dyspnea and respiratory abnormalities    HTN (hypertension)    Tobacco abuse    History of pulmonary embolism    History of DVT (deep vein thrombosis)    Subtherapeutic international normalized ratio (INR)    Chronic anemia    Iron deficiency anemia    Thrombocytosis    Pneumonia    Hypokalemia    Acute respiratory failure with hypercapnia (HCC)    Chest pain    Emphysema lung ([address]     Discharge Diagnoses: Chest Pain    Consults: none    Procedures: None    Hospital Course: The patient is a 80 y.o. female of Paddy Velez MD     Patient is a 70-year-old female admitted to [facility] for  Chest pain- right sided-likely musculoskeletal  -r/o cardiac   -Monitor labs  -Serial troponins 22-22-30-26  -NM stress test > negative  -XR right ribs: if negative patient can discharge      Leukocytosis  -Monitor labs  -WBC 13.1 > 10.0  -UA negative  -Denies any fevers or chills     Hypertension  -Resume home medications     FAISAL  -Continue with cpap at [facility] [place]    Patient discharged home in stable conditions with medications listed below. Patient instructed to follow up with all consultants and PCP within 3 weeks of discharge. No results for input(s): WBC, HGB, HCT, PLT in the last 72 hours. No results for input(s): NA, K, CL, CO2, BUN, CREATININE, GLU, CALCIUM in the last 72 hours. XR CHEST (2 VW)    Result Date: 5/10/2022  EXAMINATION: TWO X-RAY VIEWS OF THE CHEST 5/10/2022 6:14 pm COMPARISON: Chest x-ray 03/29/2022.  HISTORY: ORDERING SYSTEM PROVIDED HISTORY: shortness of breath TECHNOLOGIST PROVIDED HISTORY: Reason for exam:->shortness of breath FINDINGS: Chronic mild biapical pleural thickening. Chronic hyperinflation of the upper lobes. No obvious new infiltrate, congestion, or effusion. No pneumothorax. A chronic small horizontal band of atelectasis at the left lung base with a chronic slightly elevated left diaphragm. Heart size normal.  Aorta is calcified at the arch and not dilated. Trachea is midline. Bones are osteopenic. Mild thoracic degeneration. No obvious new compression fracture. 1.  Chronic upper lobe emphysematous changes. 2.  No definite pneumonia or effusion. CTA PULMONARY W CONTRAST    Result Date: 5/10/2022  EXAMINATION: CTA OF THE CHEST 5/10/2022 8:23 pm TECHNIQUE: CTA of the chest was performed after the administration of intravenous contrast.  Multiplanar reformatted images are provided for review. MIP images are provided for review. Automated exposure control, iterative reconstruction, and/or weight based adjustment of the mA/kV was utilized to reduce the radiation dose to as low as reasonably achievable. COMPARISON: None. HISTORY: ORDERING SYSTEM PROVIDED HISTORY: right sided chest pain TECHNOLOGIST PROVIDED HISTORY: Reason for exam:->right sided chest pain Decision Support Exception - unselect if not a suspected or confirmed emergency medical condition->Emergency Medical Condition (MA) FINDINGS: There is no indication for acute pulmonary embolus in the main PA, right left main PAs, in the lobar, segmental and subsegmental branches to the 3/4 order approximately. There is no aneurysm formation or dissection of the thoracic aorta but calcified atheromatous changes are seen particularly in the arch of the aorta and origin of the great vessels. No significant stenosis of the great vessels seen. The heart is normal size. The calcifications are seen in the coronary arteries. No mediastinal mass or adenopathy seen.  There is a relative prominent of the azygos venous system. Areas of subpleural linear reticular scar is seen in both lungs extending from the posterior aspect of the upper lobes to the posterior aspect of the lower lobe/costophrenic sulcus. No acute infiltrates or consolidations seen in the lung parenchyma. There are emphysematous changes with centri lobular emphysema in the upper lobes in particular. In the posterior segment of the right lower lobe there is a 4 mm nodule which is stable back to the study of 08/2021. Upper abdominal structures not fully covered on this study. 2 small cysts seen in the upper pole of the right kidney. The there is a cyst in the right kidney. Delete Adrenals not enlarged. There is a mild to moderate size hiatal hernia. Presence of bilateral breast prosthesis. Visualized bone structures demonstrate the mild spondylosis throughout the thoracic spine. 1.  No indication for acute pulmonary embolus. 2.  No aneurysm formation or dissection of the thoracic aorta. The calcifications relate with atherosclerotic changes seen in the thoracic aorta and coronary arteries. 3.  No acute pulmonary process. 4.  Chronic areas of fibrosis in both lungs. 5.  Presence of emphysematous changes in the upper lobes. NM Cardiac Stress Test Nuclear Imaging    Result Date: 5/11/2022  Indication:  Chest Pain. Clinical History:   Patient has no history of coronary artery disease. IMAGING: Myocardial perfusion imaging was performed at rest 30-35 minutes following the intravenous injection of 11.9 mCi of (Tc-Sestamibi) followed by 10 ml of Normal Saline. At peak exercise, the patient was injected intravenously with 31. 1mCi of (Tc-Sestamibi) followed by 10 ml of Normal Saline. Gated post-stress tomographic imaging was performed 20-25 minutes after stress. FINDINGS: The overall quality of the study was adequate. Left ventricular cavity size was noted to be normal. Rotational analog analysis demonstrated no significant motion artifacts.  The gated SPECT stress imaging in the short, vertical long, and horizontal long axis demonstrated A mild defect was present in the mid inferior wall(s) that was small sized by quantification. The resting images showed no significant reversibility. Gated SPECT left ventricular ejection fraction was calculated to be 64%, with inferior wall hypokinesis. The myocardial perfusion imaging was abnormal. The abnormality was a small area of fixed  inferior defect. Left ventricular systolic function normal, LVEF 64%. Overall low risk myocardial perfusion study. No prior study available for comparison. Discharge Exam:    HEENT: NCAT,  PERRLA, No JVD  Heart:  RRR, no murmurs, gallops, or rubs. Lungs:  CTA bilaterally, no wheeze, rales or rhonchi  Abd: bowel sounds present, nontender, nondistended, no masses  Extrem:  No clubbing, cyanosis, or edema    Disposition: home     Patient Condition at Discharge: Stable    Patient Instructions:      Medication List      START taking these medications    lidocaine 4 % external patch  Place 1 patch onto the skin daily        CONTINUE taking these medications    amLODIPine 2.5 MG tablet  Commonly known as: NORVASC  Take 1 tablet by mouth daily     Anoro Ellipta 62.5-25 MCG/INH Aepb inhaler  Generic drug: umeclidinium-vilanterol     Full Kit Nebulizer Set Misc  Use as directed with nebulized medication.      guaiFENesin 400 MG tablet  Take 1 tablet by mouth 4 times daily as needed for Cough     ipratropium-albuterol 0.5-2.5 (3) MG/3ML Soln nebulizer solution  Commonly known as: DUONEB  Inhale 3 mLs into the lungs every 6 hours as needed for Shortness of Breath     lisinopril 20 MG tablet  Commonly known as: PRINIVIL;ZESTRIL     Magic Mouthwash  Commonly known as: Miracle Mouthwash  Swish and spit 5 mLs 4 times daily as needed for Irritation     pantoprazole 40 MG tablet  Commonly known as: PROTONIX     * warfarin 7.5 MG tablet  Commonly known as: COUMADIN     * warfarin 10 MG tablet  Commonly known as: COUMADIN  Take 1 tablet by mouth Twice a Week Wed and Saturday @ 2100. * This list has 2 medication(s) that are the same as other medications prescribed for you. Read the directions carefully, and ask your doctor or other care provider to review them with you. STOP taking these medications    ferrous sulfate 325 (65 Fe) MG tablet  Commonly known as: IRON 325           Where to Get Your Medications      These medications were sent to [address] [phone]  [address] [place] [id number]    Phone: [phone]   · lidocaine 4 % external patch       Activity: activity as tolerated  Diet: cardiac diet    Pt has been advised to: Follow-up with Joana Rojas MD in 1 week.   Follow-up with consultants as recommended by them    Note that over 30 minutes was spent in preparing discharge papers, discussing discharge with patient, medication review, etc.    Signed:  Cherrie Root MD  5/12/2022